# Patient Record
Sex: FEMALE | Race: OTHER | Employment: OTHER | URBAN - METROPOLITAN AREA
[De-identification: names, ages, dates, MRNs, and addresses within clinical notes are randomized per-mention and may not be internally consistent; named-entity substitution may affect disease eponyms.]

---

## 2017-12-14 ENCOUNTER — HOSPITAL ENCOUNTER (OUTPATIENT)
Dept: OTHER | Age: 72
Discharge: HOME | End: 2017-12-14
Attending: FAMILY MEDICINE | Admitting: FAMILY MEDICINE

## 2017-12-14 DIAGNOSIS — R52 PAIN: ICD-10-CM

## 2018-05-01 ENCOUNTER — HOSPITAL ENCOUNTER (OUTPATIENT)
Dept: OTHER | Age: 73
Discharge: HOME | End: 2018-05-31
Attending: FAMILY MEDICINE | Admitting: FAMILY MEDICINE

## 2021-05-28 ENCOUNTER — HOSPITAL ENCOUNTER (OUTPATIENT)
Dept: WOMENS IMAGING | Age: 76
Discharge: HOME | End: 2021-05-28
Payer: MEDICARE

## 2021-05-28 DIAGNOSIS — Z12.31 BREAST CANCER SCREENING BY MAMMOGRAM: ICD-10-CM

## 2021-05-28 PROCEDURE — 77063 BREAST TOMOSYNTHESIS BI: CPT

## 2022-08-10 ENCOUNTER — HOSPITAL ENCOUNTER (INPATIENT)
Age: 77
LOS: 4 days | Discharge: HOME HEALTH | DRG: 247 | End: 2022-08-14
Attending: EMERGENCY MEDICINE | Admitting: INTERNAL MEDICINE
Payer: MEDICARE

## 2022-08-10 ENCOUNTER — APPOINTMENT (OUTPATIENT)
Dept: GENERAL RADIOLOGY | Age: 77
DRG: 247 | End: 2022-08-10
Payer: MEDICARE

## 2022-08-10 ENCOUNTER — APPOINTMENT (OUTPATIENT)
Dept: CT IMAGING | Age: 77
DRG: 247 | End: 2022-08-10
Payer: MEDICARE

## 2022-08-10 DIAGNOSIS — J81.0 ACUTE PULMONARY EDEMA (HCC): Primary | ICD-10-CM

## 2022-08-10 DIAGNOSIS — R06.02 SHORTNESS OF BREATH: ICD-10-CM

## 2022-08-10 DIAGNOSIS — J90 BILATERAL PLEURAL EFFUSION: ICD-10-CM

## 2022-08-10 DIAGNOSIS — R73.9 HYPERGLYCEMIA: ICD-10-CM

## 2022-08-10 DIAGNOSIS — Z20.822 PERSON UNDER INVESTIGATION FOR COVID-19: ICD-10-CM

## 2022-08-10 PROBLEM — I50.9 CHF (CONGESTIVE HEART FAILURE) (HCC): Status: ACTIVE | Noted: 2022-08-10

## 2022-08-10 PROBLEM — I50.9 ACUTE ON CHRONIC HEART FAILURE, UNSPECIFIED HEART FAILURE TYPE (HCC): Status: ACTIVE | Noted: 2022-08-10

## 2022-08-10 PROBLEM — E11.9 DM2 (DIABETES MELLITUS, TYPE 2) (HCC): Status: ACTIVE | Noted: 2022-08-10

## 2022-08-10 PROBLEM — D64.9 ANEMIA: Status: ACTIVE | Noted: 2022-08-10

## 2022-08-10 PROBLEM — R79.89 ELEVATED LIVER FUNCTION TESTS: Status: ACTIVE | Noted: 2022-08-10

## 2022-08-10 PROBLEM — J96.01 ACUTE RESPIRATORY FAILURE WITH HYPOXIA (HCC): Status: ACTIVE | Noted: 2022-08-10

## 2022-08-10 LAB
A/G RATIO: 1.3 (ref 1.1–2.2)
ALBUMIN SERPL-MCNC: 4 G/DL (ref 3.4–5)
ALP BLD-CCNC: 149 U/L (ref 40–129)
ALT SERPL-CCNC: 90 U/L (ref 10–40)
ANION GAP SERPL CALCULATED.3IONS-SCNC: 11 MMOL/L (ref 3–16)
AST SERPL-CCNC: 67 U/L (ref 15–37)
BASOPHILS ABSOLUTE: 0.1 K/UL (ref 0–0.2)
BASOPHILS RELATIVE PERCENT: 1 %
BILIRUB SERPL-MCNC: 0.5 MG/DL (ref 0–1)
BUN BLDV-MCNC: 22 MG/DL (ref 7–20)
CALCIUM SERPL-MCNC: 9.2 MG/DL (ref 8.3–10.6)
CHLORIDE BLD-SCNC: 101 MMOL/L (ref 99–110)
CO2: 22 MMOL/L (ref 21–32)
CREAT SERPL-MCNC: 1 MG/DL (ref 0.6–1.2)
D DIMER: 1.42 UG/ML FEU (ref 0–0.6)
EOSINOPHILS ABSOLUTE: 0.2 K/UL (ref 0–0.6)
EOSINOPHILS RELATIVE PERCENT: 2.8 %
GFR AFRICAN AMERICAN: >60
GFR NON-AFRICAN AMERICAN: 54
GLUCOSE BLD-MCNC: 346 MG/DL (ref 70–99)
GLUCOSE BLD-MCNC: 372 MG/DL (ref 70–99)
HCT VFR BLD CALC: 31.2 % (ref 36–48)
HEMOGLOBIN: 10.7 G/DL (ref 12–16)
LACTIC ACID, SEPSIS: 1.8 MMOL/L (ref 0.4–1.9)
LACTIC ACID, SEPSIS: 2.4 MMOL/L (ref 0.4–1.9)
LYMPHOCYTES ABSOLUTE: 1.9 K/UL (ref 1–5.1)
LYMPHOCYTES RELATIVE PERCENT: 24.4 %
MCH RBC QN AUTO: 31.4 PG (ref 26–34)
MCHC RBC AUTO-ENTMCNC: 34.4 G/DL (ref 31–36)
MCV RBC AUTO: 91.3 FL (ref 80–100)
MONOCYTES ABSOLUTE: 0.5 K/UL (ref 0–1.3)
MONOCYTES RELATIVE PERCENT: 6 %
NEUTROPHILS ABSOLUTE: 5 K/UL (ref 1.7–7.7)
NEUTROPHILS RELATIVE PERCENT: 65.8 %
PDW BLD-RTO: 13.1 % (ref 12.4–15.4)
PERFORMED ON: ABNORMAL
PLATELET # BLD: 281 K/UL (ref 135–450)
PMV BLD AUTO: 8.7 FL (ref 5–10.5)
POTASSIUM REFLEX MAGNESIUM: 4.6 MMOL/L (ref 3.5–5.1)
PRO-BNP: 9808 PG/ML (ref 0–449)
PROCALCITONIN: 0.08 NG/ML (ref 0–0.15)
RBC # BLD: 3.41 M/UL (ref 4–5.2)
SODIUM BLD-SCNC: 134 MMOL/L (ref 136–145)
TOTAL PROTEIN: 7.1 G/DL (ref 6.4–8.2)
TROPONIN: <0.01 NG/ML
WBC # BLD: 7.6 K/UL (ref 4–11)

## 2022-08-10 PROCEDURE — 85025 COMPLETE CBC W/AUTO DIFF WBC: CPT

## 2022-08-10 PROCEDURE — 71260 CT THORAX DX C+: CPT | Performed by: EMERGENCY MEDICINE

## 2022-08-10 PROCEDURE — 96374 THER/PROPH/DIAG INJ IV PUSH: CPT

## 2022-08-10 PROCEDURE — 84484 ASSAY OF TROPONIN QUANT: CPT

## 2022-08-10 PROCEDURE — U0003 INFECTIOUS AGENT DETECTION BY NUCLEIC ACID (DNA OR RNA); SEVERE ACUTE RESPIRATORY SYNDROME CORONAVIRUS 2 (SARS-COV-2) (CORONAVIRUS DISEASE [COVID-19]), AMPLIFIED PROBE TECHNIQUE, MAKING USE OF HIGH THROUGHPUT TECHNOLOGIES AS DESCRIBED BY CMS-2020-01-R: HCPCS

## 2022-08-10 PROCEDURE — 83880 ASSAY OF NATRIURETIC PEPTIDE: CPT

## 2022-08-10 PROCEDURE — 83605 ASSAY OF LACTIC ACID: CPT

## 2022-08-10 PROCEDURE — 99285 EMERGENCY DEPT VISIT HI MDM: CPT

## 2022-08-10 PROCEDURE — U0005 INFEC AGEN DETEC AMPLI PROBE: HCPCS

## 2022-08-10 PROCEDURE — 84145 PROCALCITONIN (PCT): CPT

## 2022-08-10 PROCEDURE — 71045 X-RAY EXAM CHEST 1 VIEW: CPT

## 2022-08-10 PROCEDURE — 87040 BLOOD CULTURE FOR BACTERIA: CPT

## 2022-08-10 PROCEDURE — 6360000002 HC RX W HCPCS: Performed by: EMERGENCY MEDICINE

## 2022-08-10 PROCEDURE — 6370000000 HC RX 637 (ALT 250 FOR IP): Performed by: EMERGENCY MEDICINE

## 2022-08-10 PROCEDURE — 93005 ELECTROCARDIOGRAM TRACING: CPT | Performed by: EMERGENCY MEDICINE

## 2022-08-10 PROCEDURE — 6360000004 HC RX CONTRAST MEDICATION: Performed by: EMERGENCY MEDICINE

## 2022-08-10 PROCEDURE — 80053 COMPREHEN METABOLIC PANEL: CPT

## 2022-08-10 PROCEDURE — 85379 FIBRIN DEGRADATION QUANT: CPT

## 2022-08-10 PROCEDURE — 2060000000 HC ICU INTERMEDIATE R&B

## 2022-08-10 RX ORDER — CEPHALEXIN 500 MG/1
CAPSULE ORAL
Status: ON HOLD | COMMUNITY
End: 2022-08-14 | Stop reason: HOSPADM

## 2022-08-10 RX ORDER — GLIPIZIDE 5 MG/1
5 TABLET ORAL
COMMUNITY
End: 2022-10-05

## 2022-08-10 RX ORDER — ENOXAPARIN SODIUM 100 MG/ML
40 INJECTION SUBCUTANEOUS DAILY
Status: DISCONTINUED | OUTPATIENT
Start: 2022-08-11 | End: 2022-08-14 | Stop reason: HOSPADM

## 2022-08-10 RX ORDER — HYDRALAZINE HYDROCHLORIDE 20 MG/ML
10 INJECTION INTRAMUSCULAR; INTRAVENOUS EVERY 6 HOURS PRN
Status: DISCONTINUED | OUTPATIENT
Start: 2022-08-10 | End: 2022-08-14 | Stop reason: HOSPADM

## 2022-08-10 RX ORDER — GLYBURIDE 5 MG/1
5 TABLET ORAL 2 TIMES DAILY WITH MEALS
Status: DISCONTINUED | OUTPATIENT
Start: 2022-08-11 | End: 2022-08-11 | Stop reason: ALTCHOICE

## 2022-08-10 RX ORDER — CLINDAMYCIN HYDROCHLORIDE 300 MG/1
CAPSULE ORAL
Status: ON HOLD | COMMUNITY
End: 2022-08-14 | Stop reason: HOSPADM

## 2022-08-10 RX ORDER — DEXTROSE MONOHYDRATE 100 MG/ML
INJECTION, SOLUTION INTRAVENOUS CONTINUOUS PRN
Status: DISCONTINUED | OUTPATIENT
Start: 2022-08-10 | End: 2022-08-11 | Stop reason: SDUPTHER

## 2022-08-10 RX ORDER — FUROSEMIDE 10 MG/ML
40 INJECTION INTRAMUSCULAR; INTRAVENOUS ONCE
Status: COMPLETED | OUTPATIENT
Start: 2022-08-10 | End: 2022-08-10

## 2022-08-10 RX ORDER — SITAGLIPTIN AND METFORMIN HYDROCHLORIDE 1000; 50 MG/1; MG/1
1 TABLET, FILM COATED ORAL 2 TIMES DAILY WITH MEALS
COMMUNITY
End: 2022-10-05 | Stop reason: ALTCHOICE

## 2022-08-10 RX ORDER — ONDANSETRON 4 MG/1
4 TABLET, ORALLY DISINTEGRATING ORAL EVERY 8 HOURS PRN
Status: DISCONTINUED | OUTPATIENT
Start: 2022-08-10 | End: 2022-08-14 | Stop reason: HOSPADM

## 2022-08-10 RX ORDER — ACETAMINOPHEN 650 MG/1
650 SUPPOSITORY RECTAL EVERY 6 HOURS PRN
Status: DISCONTINUED | OUTPATIENT
Start: 2022-08-10 | End: 2022-08-14 | Stop reason: HOSPADM

## 2022-08-10 RX ORDER — ONDANSETRON 2 MG/ML
4 INJECTION INTRAMUSCULAR; INTRAVENOUS EVERY 6 HOURS PRN
Status: DISCONTINUED | OUTPATIENT
Start: 2022-08-10 | End: 2022-08-14 | Stop reason: HOSPADM

## 2022-08-10 RX ORDER — SODIUM CHLORIDE 0.9 % (FLUSH) 0.9 %
10 SYRINGE (ML) INJECTION PRN
Status: DISCONTINUED | OUTPATIENT
Start: 2022-08-10 | End: 2022-08-14 | Stop reason: HOSPADM

## 2022-08-10 RX ORDER — POTASSIUM CHLORIDE 7.45 MG/ML
10 INJECTION INTRAVENOUS PRN
Status: DISCONTINUED | OUTPATIENT
Start: 2022-08-10 | End: 2022-08-14 | Stop reason: HOSPADM

## 2022-08-10 RX ORDER — M-VIT,TX,IRON,MINS/CALC/FOLIC 27MG-0.4MG
1 TABLET ORAL DAILY
Status: DISCONTINUED | OUTPATIENT
Start: 2022-08-11 | End: 2022-08-14 | Stop reason: HOSPADM

## 2022-08-10 RX ORDER — FUROSEMIDE 10 MG/ML
40 INJECTION INTRAMUSCULAR; INTRAVENOUS 3 TIMES DAILY
Status: DISCONTINUED | OUTPATIENT
Start: 2022-08-11 | End: 2022-08-11

## 2022-08-10 RX ORDER — POTASSIUM CHLORIDE 20 MEQ/1
40 TABLET, EXTENDED RELEASE ORAL PRN
Status: DISCONTINUED | OUTPATIENT
Start: 2022-08-10 | End: 2022-08-14 | Stop reason: HOSPADM

## 2022-08-10 RX ORDER — SODIUM CHLORIDE 0.9 % (FLUSH) 0.9 %
5-40 SYRINGE (ML) INJECTION EVERY 12 HOURS SCHEDULED
Status: DISCONTINUED | OUTPATIENT
Start: 2022-08-11 | End: 2022-08-14 | Stop reason: HOSPADM

## 2022-08-10 RX ORDER — 0.9 % SODIUM CHLORIDE 0.9 %
500 INTRAVENOUS SOLUTION INTRAVENOUS PRN
Status: DISCONTINUED | OUTPATIENT
Start: 2022-08-10 | End: 2022-08-14 | Stop reason: HOSPADM

## 2022-08-10 RX ORDER — SODIUM CHLORIDE 9 MG/ML
INJECTION, SOLUTION INTRAVENOUS PRN
Status: DISCONTINUED | OUTPATIENT
Start: 2022-08-10 | End: 2022-08-14 | Stop reason: HOSPADM

## 2022-08-10 RX ORDER — ACETAMINOPHEN 325 MG/1
650 TABLET ORAL EVERY 6 HOURS PRN
Status: DISCONTINUED | OUTPATIENT
Start: 2022-08-10 | End: 2022-08-14 | Stop reason: HOSPADM

## 2022-08-10 RX ADMIN — NITROGLYCERIN 1 INCH: 20 OINTMENT TOPICAL at 21:52

## 2022-08-10 RX ADMIN — FUROSEMIDE 40 MG: 10 INJECTION, SOLUTION INTRAMUSCULAR; INTRAVENOUS at 21:55

## 2022-08-10 RX ADMIN — IOPAMIDOL 75 ML: 755 INJECTION, SOLUTION INTRAVENOUS at 20:33

## 2022-08-10 ASSESSMENT — PAIN DESCRIPTION - LOCATION
LOCATION: CHEST
LOCATION: SHOULDER

## 2022-08-10 ASSESSMENT — PAIN DESCRIPTION - ORIENTATION: ORIENTATION: LEFT

## 2022-08-10 ASSESSMENT — PAIN DESCRIPTION - ONSET: ONSET: ON-GOING

## 2022-08-10 ASSESSMENT — PAIN SCALES - GENERAL: PAINLEVEL_OUTOF10: 7

## 2022-08-10 ASSESSMENT — PAIN - FUNCTIONAL ASSESSMENT: PAIN_FUNCTIONAL_ASSESSMENT: NONE - DENIES PAIN

## 2022-08-10 ASSESSMENT — PAIN DESCRIPTION - DESCRIPTORS: DESCRIPTORS: SORE

## 2022-08-10 ASSESSMENT — PAIN DESCRIPTION - FREQUENCY: FREQUENCY: CONTINUOUS

## 2022-08-10 NOTE — ED PROVIDER NOTES
[facility] Emergency Department    Cori Winkler MD, am the primary clinician of record. CHIEF COMPLAINT  Chief Complaint   Patient presents with    Shortness of Breath     SOB, can not walk, no sleep for 2 days, if she walks its gets work. HISTORY OF PRESENT ILLNESS  Hao Nolasco is a 78 y.o. female  who presents to the ED complaining of shortness of breath, really over the past 10 days or so but particularly bad over the past 2 days. She has not had much coughing. Denies any leg swelling. She is worried about heart failure given that she lifted up and has orthopnea. She also has not had a fever. She went to urgent care and was told she was hypoxic and placed on nasal cannula oxygen for several hours and then sent here. She had a rapid COVID test there that was negative. She is not having chest pain or belly pain currently. History of diabetes. 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 No other complaints, modifying factors or associated symptoms. I have reviewed the following from the nursing documentation. Past Medical History:   Diagnosis Date    Diabetes mellitus ([facility] [address]     Hypothyroid      History reviewed. No pertinent surgical history. History reviewed. No pertinent family history.   Social History     Socioeconomic History    Marital status:      Spouse name: Not on file    Number of children: Not on file    Years of education: Not on file    Highest education level: Not on file   Occupational History    Not on file   Tobacco Use    Smoking status: Never    Smokeless tobacco: Not on file   Substance and Sexual Activity    Alcohol use: No    Drug use: Not on file    Sexual activity: Not on file   Other Topics Concern    Not on file   Social History Narrative    Not on file     Social Determinants of Health     Financial Resource Strain: Not on file   Food Insecurity: Not on file   Transportation Needs: Not on file   Physical Activity: Not on file   Stress: Not on file   Social Connections: Not on file   Intimate Partner Violence: Not on file   Housing Stability: Not on file     No current facility-administered medications for this encounter. Current Outpatient Medications   Medication Sig Dispense Refill    sitaGLIPtan-metFORMIN (JANUMET)  MG per tablet Janumet 50 mg-1,000 mg tablet      glipiZIDE (GLUCOTROL) 5 MG tablet glipizide 5 mg tablet      cephALEXin (KEFLEX) 500 MG capsule cephalexin 500 mg capsule (Patient not taking: Reported on 8/10/2022)      clindamycin (CLEOCIN) 300 MG capsule clindamycin HCl 300 mg capsule (Patient not taking: Reported on 8/10/2022)      metformin (GLUCOPHAGE) 500 MG tablet Take 1,000 mg by mouth 2 times daily (with meals). glyBURIDE (DIABETA) 5 MG tablet Take 5 mg by mouth 2 times daily (with meals). therapeutic multivitamin-minerals (THERAGRAN-M) tablet Take 1 tablet by mouth daily. Allergies   Allergen Reactions    Codeine      nausea  Other reaction(s): Vomiting       REVIEW OF SYSTEMS  10 systems reviewed, pertinent positives per HPI otherwise noted to be negative. PHYSICAL EXAM  /72   Pulse 89   Temp 98.1 °F (36.7 °C)   Resp 24   Ht 4' 10\" (1.473 m)   Wt 85 lb 14.4 oz (39 kg)   SpO2 95%   BMI 17.95 kg/m²    GENERAL APPEARANCE: Awake and alert. Cooperative. No distress. HENT: Normocephalic. Atraumatic. Mucous membranes are dry. NECK: Supple. EYES: PERRL. EOM's grossly intact. HEART/CHEST: RRR. No murmurs. No chest wall tenderness. LUNGS: Respirations mildly labored with conversational dyspnea and bibasilar rales with scattered rhonchi, no wheezing on my evaluation. ABDOMEN: No tenderness. Soft. Non-distended. No masses. No organomegaly. No guarding or rebound. Normal bowel sounds throughout. MUSCULOSKELETAL: No extremity edema. Compartments soft. No deformity. No tenderness in the extremities. All extremities neurovascularly intact.   SKIN: Warm and dry. No acute rashes. NEUROLOGICAL: Alert and oriented. CN's 2-12 intact. No gross facial drooping. Strength 5/5, sensation intact. 2 plus DTR's in knees bilaterally. Gait normal.  PSYCHIATRIC: Normal mood and affect. LABS  I have reviewed all labs for this visit.    Results for orders placed or performed during the hospital encounter of 08/10/22   CBC with Auto Differential   Result Value Ref Range    WBC 7.6 4.0 - 11.0 K/uL    RBC 3.41 (L) 4.00 - 5.20 M/uL    Hemoglobin 10.7 (L) 12.0 - 16.0 g/dL    Hematocrit 31.2 (L) 36.0 - 48.0 %    MCV 91.3 80.0 - 100.0 fL    MCH 31.4 26.0 - 34.0 pg    MCHC 34.4 31.0 - 36.0 g/dL    RDW 13.1 12.4 - 15.4 %    Platelets 253 182 - 732 K/uL    MPV 8.7 5.0 - 10.5 fL    Neutrophils % 65.8 %    Lymphocytes % 24.4 %    Monocytes % 6.0 %    Eosinophils % 2.8 %    Basophils % 1.0 %    Neutrophils Absolute 5.0 1.7 - 7.7 K/uL    Lymphocytes Absolute 1.9 1.0 - 5.1 K/uL    Monocytes Absolute 0.5 0.0 - 1.3 K/uL    Eosinophils Absolute 0.2 0.0 - 0.6 K/uL    Basophils Absolute 0.1 0.0 - 0.2 K/uL   Comprehensive Metabolic Panel w/ Reflex to MG   Result Value Ref Range    Sodium 134 (L) 136 - 145 mmol/L    Potassium reflex Magnesium 4.6 3.5 - 5.1 mmol/L    Chloride 101 99 - 110 mmol/L    CO2 22 21 - 32 mmol/L    Anion Gap 11 3 - 16    Glucose 372 (H) 70 - 99 mg/dL    BUN 22 (H) 7 - 20 mg/dL    Creatinine 1.0 0.6 - 1.2 mg/dL    GFR Non-African American 54 (A) >60    GFR African American >60 >60    Calcium 9.2 8.3 - 10.6 mg/dL    Total Protein 7.1 6.4 - 8.2 g/dL    Albumin 4.0 3.4 - 5.0 g/dL    Albumin/Globulin Ratio 1.3 1.1 - 2.2    Total Bilirubin 0.5 0.0 - 1.0 mg/dL    Alkaline Phosphatase 149 (H) 40 - 129 U/L    ALT 90 (H) 10 - 40 U/L    AST 67 (H) 15 - 37 U/L   Troponin   Result Value Ref Range    Troponin <0.01 <0.01 ng/mL   Brain Natriuretic Peptide   Result Value Ref Range    Pro-BNP 9,808 (H) 0 - 449 pg/mL   Lactate, Sepsis   Result Value Ref Range    Lactic Acid, Sepsis 2.4 (H) 0.4 - 1.9 mmol/L   Lactate, Sepsis   Result Value Ref Range    Lactic Acid, Sepsis 1.8 0.4 - 1.9 mmol/L   Procalcitonin   Result Value Ref Range    Procalcitonin 0.08 0.00 - 0.15 ng/mL   D-Dimer, Quantitative   Result Value Ref Range    D-Dimer, Quant 1.42 (H) 0.00 - 0.60 ug/mL FEU   EKG 12 Lead   Result Value Ref Range    Ventricular Rate 99 BPM    Atrial Rate 99 BPM    P-R Interval 136 ms    QRS Duration 82 ms    Q-T Interval 352 ms    QTc Calculation (Bazett) 451 ms    P Axis 25 degrees    R Axis -15 degrees    T Axis -30 degrees    Diagnosis       Normal sinus rhythmPossible Anterior infarct , age undeterminedAbnormal ECG       The 12 lead EKG was interpreted by me as follows:  Rate: normal with a rate of 99  Rhythm: sinus  Axis: normal  Intervals: normal GA, narrow QRS, normal QTc  ST segments: no ST elevations or depressions  T waves: no abnormal inversions  Non-specific T wave changes: not present  Prior EKG comparison: No prior is currently available for comparison    RADIOLOGY    XR CHEST PORTABLE    Result Date: 8/10/2022  EXAMINATION: ONE XRAY VIEW OF THE CHEST 8/10/2022 6:26 pm COMPARISON: None. HISTORY: ORDERING SYSTEM PROVIDED HISTORY: SOB TECHNOLOGIST PROVIDED HISTORY: Reason for exam:->SOB Reason for Exam: SHOB FINDINGS: The cardiac silhouette is enlarged. Aortic vascular calcification. There is vascular congestion with perihilar edema along with small bilateral effusions most consistent with CHF. Mild dependent atelectasis is suggested bilaterally. CHF with cardiomegaly, perihilar edema and small bilateral effusions. CT CHEST PULMONARY EMBOLISM W CONTRAST    Result Date: 8/10/2022  EXAMINATION: CTA OF THE CHEST 8/10/2022 8:08 pm TECHNIQUE: CTA of the chest was performed after the administration of intravenous contrast.  Multiplanar reformatted images are provided for review. MIP images are provided for review.  Automated exposure control, iterative reconstruction, and/or weight based adjustment of the mA/kV was utilized to reduce the radiation dose to as low as reasonably achievable. COMPARISON: None. HISTORY: ORDERING SYSTEM PROVIDED HISTORY: +SOB, +ddimer TECHNOLOGIST PROVIDED HISTORY: Reason for exam:->+SOB, +ddimer Decision Support Exception - unselect if not a suspected or confirmed emergency medical condition->Emergency Medical Condition (MA) Reason for Exam: +SOB, +ddimer FINDINGS: Pulmonary Arteries: Pulmonary arteries are adequately opacified for evaluation. No evidence of intraluminal filling defect to suggest pulmonary embolism. Main pulmonary artery is normal in caliber. Mediastinum: No evidence of mediastinal lymphadenopathy. The heart and pericardium demonstrate no acute abnormality. There is no acute abnormality of the thoracic aorta. Lungs/pleura: There is septal thickening, likely related to mild pulmonary vascular congestion. There are moderate bilateral pleural effusions. There is mild dependent and discoid atelectasis at the bilateral lung bases. There is bronchial wall thickening, likely related to small airway disease or bronchiolitis. There is mucous plugging. No pneumothorax. Upper Abdomen: Limited images of the upper abdomen are unremarkable. Soft Tissues/Bones: No acute bone or soft tissue abnormality. No evidence of pulmonary embolism Septal thickening, likely related to mild pulmonary vascular congestion. Moderate bilateral pleural effusions. Bronchial wall thickening, likely related to small airway disease or bronchiolitis. Mucous plugging. ED COURSE/MDM  Patient seen and evaluated. Old records reviewed. Labs and imaging reviewed and results discussed with patient.       After initial evaluation, differential diagnostic considerations included: acute coronary syndrome, pulmonary embolism, COPD/asthma, pneumonia, sepsis, pericardial tamponade, pneumothorax, CHF, thoracic aortic dissection, anxiety      The patient's ED workup was notable for concern for new diagnosis of heart failure with pulmonary edema and bilateral pleural effusions. Not frankly hypoxic here but due to work of breathing and ambulatory pulse oxygenation she was placed on nasal cannula oxygen with improvement of her symptoms of shortness of breath. Her BNP is notably elevated and imaging does demonstrate some fluid overload although not really a lot of peripheral edema on exam.  As such she will be diuresed and given nitroglycerin to augment the Lasix. She does not have a leukocytosis. Initial lactate mildly elevated but this is more attributable to her respiratory symptoms and heart failure and not sepsis, I do not suspect bacterial infection. Procalcitonin is also normal.  Rapid COVID-negative at urgent care prior to arrival.  I did obtain a PCR to rule this out here although suspicion for infection is lower. Patient be admitted to the hospital for further evaluation. Her high blood sugar is not associated with DKA. CTPA without infiltrate or PE. Is this patient to be included in the SEP-1 Core Measure? No   Exclusion criteria - the patient is NOT to be included for SEP-1 Core Measure due to: Alternative explanation for abnormal labs/vitals that do not relate to sepsis, see MDM for further explanation  Bacterial infection not found or suspected. During the patient's ED course, the patient was given:  Medications   iopamidol (ISOVUE-370) 76 % injection 75 mL (75 mLs IntraVENous Given 8/10/22 2033)   furosemide (LASIX) injection 40 mg (40 mg IntraVENous Given 8/10/22 2155)   nitroglycerin (NITRO-BID) 2 % ointment 1 inch (1 inch Topical Given 8/10/22 2152)        CLINICAL IMPRESSION  1. Acute pulmonary edema (HCC)    2. Shortness of breath    3. Person under investigation for COVID-19    4. Hyperglycemia    5. Bilateral pleural effusion        Blood pressure 136/72, pulse 89, temperature 98.1 °F (36.7 °C), resp.  rate 24, height 4' 10\" (1.473 m), weight 85 lb 14.4 oz (39 kg), SpO2 95 %. DISPOSITION  Hao Gooden was admitted in fair condition. The plan is to admit to the hospital at this time under the PCP's admitting service. Dr. Louise Xavier accepted the patient and will take over the patient's care. The total critical care time I independently spent while evaluating and treating this patient was 32 minutes. This excludes time spent doing separately billable procedures. This includes time at the bedside, data interpretation, medication management, obtaining critical history from collateral sources if the patient is unable to provide it directly, and physician consultation. Specifics of interventions taken and potentially life-threatening diagnostic considerations are listed above in the medical decision making. If this was a shared visit with an EMILIANO, the time in this attestation is non-concurrent critical care time out of the total shared critical care time provided by the EMILIANO and myself. DISCLAIMER: This chart was created using Dragon dictation software. Efforts were made by me to ensure accuracy, however some errors may be present due to limitations of this technology and occasionally words are not transcribed correctly.         Barbara Youssef MD  08/10/22 2228       Barbara Youssef MD  08/10/22 2226

## 2022-08-11 ENCOUNTER — APPOINTMENT (OUTPATIENT)
Dept: ULTRASOUND IMAGING | Age: 77
DRG: 247 | End: 2022-08-11
Payer: MEDICARE

## 2022-08-11 ENCOUNTER — APPOINTMENT (OUTPATIENT)
Dept: GENERAL RADIOLOGY | Age: 77
DRG: 247 | End: 2022-08-11
Payer: MEDICARE

## 2022-08-11 PROBLEM — R06.02 SHORTNESS OF BREATH: Status: ACTIVE | Noted: 2022-08-11

## 2022-08-11 PROBLEM — J81.0 ACUTE PULMONARY EDEMA (HCC): Status: ACTIVE | Noted: 2022-08-11

## 2022-08-11 PROBLEM — I50.21 ACUTE SYSTOLIC (CONGESTIVE) HEART FAILURE (HCC): Status: ACTIVE | Noted: 2022-08-11

## 2022-08-11 LAB
A/G RATIO: 1.3 (ref 1.1–2.2)
ALBUMIN SERPL-MCNC: 4.1 G/DL (ref 3.4–5)
ALP BLD-CCNC: 148 U/L (ref 40–129)
ALT SERPL-CCNC: 84 U/L (ref 10–40)
ANION GAP SERPL CALCULATED.3IONS-SCNC: 13 MMOL/L (ref 3–16)
AST SERPL-CCNC: 57 U/L (ref 15–37)
BASOPHILS ABSOLUTE: 0.1 K/UL (ref 0–0.2)
BASOPHILS RELATIVE PERCENT: 1.2 %
BILIRUB SERPL-MCNC: 0.6 MG/DL (ref 0–1)
BILIRUBIN DIRECT: <0.2 MG/DL (ref 0–0.3)
BILIRUBIN, INDIRECT: NORMAL MG/DL (ref 0–1)
BUN BLDV-MCNC: 25 MG/DL (ref 7–20)
CALCIUM SERPL-MCNC: 9.6 MG/DL (ref 8.3–10.6)
CHLORIDE BLD-SCNC: 96 MMOL/L (ref 99–110)
CHOLESTEROL, TOTAL: 195 MG/DL (ref 0–199)
CO2: 25 MMOL/L (ref 21–32)
CREAT SERPL-MCNC: 0.9 MG/DL (ref 0.6–1.2)
D DIMER: 1.41 UG/ML FEU (ref 0–0.6)
EKG ATRIAL RATE: 99 BPM
EKG DIAGNOSIS: NORMAL
EKG P AXIS: 25 DEGREES
EKG P-R INTERVAL: 136 MS
EKG Q-T INTERVAL: 352 MS
EKG QRS DURATION: 82 MS
EKG QTC CALCULATION (BAZETT): 451 MS
EKG R AXIS: -15 DEGREES
EKG T AXIS: -30 DEGREES
EKG VENTRICULAR RATE: 99 BPM
EOSINOPHILS ABSOLUTE: 0.4 K/UL (ref 0–0.6)
EOSINOPHILS RELATIVE PERCENT: 4.4 %
ESTIMATED AVERAGE GLUCOSE: 240.3 MG/DL
GFR AFRICAN AMERICAN: >60
GFR NON-AFRICAN AMERICAN: >60
GLUCOSE BLD-MCNC: 277 MG/DL (ref 70–99)
GLUCOSE BLD-MCNC: 316 MG/DL (ref 70–99)
GLUCOSE BLD-MCNC: 364 MG/DL (ref 70–99)
GLUCOSE BLD-MCNC: 404 MG/DL (ref 70–99)
GLUCOSE BLD-MCNC: 514 MG/DL (ref 70–99)
GLUCOSE BLD-MCNC: 97 MG/DL (ref 70–99)
HAV IGM SER IA-ACNC: NORMAL
HBA1C MFR BLD: 10 %
HCT VFR BLD CALC: 34.1 % (ref 36–48)
HDLC SERPL-MCNC: 63 MG/DL (ref 40–60)
HEMOGLOBIN: 11.7 G/DL (ref 12–16)
HEPATITIS B CORE IGM ANTIBODY: NORMAL
HEPATITIS B SURFACE ANTIGEN INTERPRETATION: NORMAL
HEPATITIS C ANTIBODY INTERPRETATION: NORMAL
IRON SATURATION: 28 % (ref 15–50)
IRON: 87 UG/DL (ref 37–145)
LACTIC ACID: 2.3 MMOL/L (ref 0.4–2)
LDL CHOLESTEROL CALCULATED: 109 MG/DL
LV EF: 18 %
LVEF MODALITY: NORMAL
LYMPHOCYTES ABSOLUTE: 2 K/UL (ref 1–5.1)
LYMPHOCYTES RELATIVE PERCENT: 25.3 %
MAGNESIUM: 1.8 MG/DL (ref 1.8–2.4)
MCH RBC QN AUTO: 30.9 PG (ref 26–34)
MCHC RBC AUTO-ENTMCNC: 34.2 G/DL (ref 31–36)
MCV RBC AUTO: 90.4 FL (ref 80–100)
MONOCYTES ABSOLUTE: 0.5 K/UL (ref 0–1.3)
MONOCYTES RELATIVE PERCENT: 6.2 %
NEUTROPHILS ABSOLUTE: 5 K/UL (ref 1.7–7.7)
NEUTROPHILS RELATIVE PERCENT: 62.9 %
PDW BLD-RTO: 13.1 % (ref 12.4–15.4)
PERFORMED ON: ABNORMAL
PERFORMED ON: NORMAL
PLATELET # BLD: 291 K/UL (ref 135–450)
PMV BLD AUTO: 8.9 FL (ref 5–10.5)
POTASSIUM SERPL-SCNC: 4 MMOL/L (ref 3.5–5.1)
RBC # BLD: 3.78 M/UL (ref 4–5.2)
SARS-COV-2, PCR: NOT DETECTED
SODIUM BLD-SCNC: 134 MMOL/L (ref 136–145)
T4 FREE: 1.6 NG/DL (ref 0.9–1.8)
TOTAL IRON BINDING CAPACITY: 316 UG/DL (ref 260–445)
TOTAL PROTEIN: 7.2 G/DL (ref 6.4–8.2)
TRIGL SERPL-MCNC: 115 MG/DL (ref 0–150)
TSH SERPL DL<=0.05 MIU/L-ACNC: 2.93 UIU/ML (ref 0.27–4.2)
VLDLC SERPL CALC-MCNC: 23 MG/DL
WBC # BLD: 8 K/UL (ref 4–11)

## 2022-08-11 PROCEDURE — 83540 ASSAY OF IRON: CPT

## 2022-08-11 PROCEDURE — 99223 1ST HOSP IP/OBS HIGH 75: CPT | Performed by: INTERNAL MEDICINE

## 2022-08-11 PROCEDURE — 80061 LIPID PANEL: CPT

## 2022-08-11 PROCEDURE — 82248 BILIRUBIN DIRECT: CPT

## 2022-08-11 PROCEDURE — 97165 OT EVAL LOW COMPLEX 30 MIN: CPT

## 2022-08-11 PROCEDURE — 6370000000 HC RX 637 (ALT 250 FOR IP): Performed by: INTERNAL MEDICINE

## 2022-08-11 PROCEDURE — 93306 TTE W/DOPPLER COMPLETE: CPT

## 2022-08-11 PROCEDURE — 83605 ASSAY OF LACTIC ACID: CPT

## 2022-08-11 PROCEDURE — 97535 SELF CARE MNGMENT TRAINING: CPT

## 2022-08-11 PROCEDURE — 85025 COMPLETE CBC W/AUTO DIFF WBC: CPT

## 2022-08-11 PROCEDURE — 83550 IRON BINDING TEST: CPT

## 2022-08-11 PROCEDURE — 2580000003 HC RX 258: Performed by: INTERNAL MEDICINE

## 2022-08-11 PROCEDURE — 97530 THERAPEUTIC ACTIVITIES: CPT

## 2022-08-11 PROCEDURE — 83036 HEMOGLOBIN GLYCOSYLATED A1C: CPT

## 2022-08-11 PROCEDURE — 80074 ACUTE HEPATITIS PANEL: CPT

## 2022-08-11 PROCEDURE — 84443 ASSAY THYROID STIM HORMONE: CPT

## 2022-08-11 PROCEDURE — 80053 COMPREHEN METABOLIC PANEL: CPT

## 2022-08-11 PROCEDURE — 97161 PT EVAL LOW COMPLEX 20 MIN: CPT

## 2022-08-11 PROCEDURE — 84439 ASSAY OF FREE THYROXINE: CPT

## 2022-08-11 PROCEDURE — 85379 FIBRIN DEGRADATION QUANT: CPT

## 2022-08-11 PROCEDURE — 36415 COLL VENOUS BLD VENIPUNCTURE: CPT

## 2022-08-11 PROCEDURE — 97116 GAIT TRAINING THERAPY: CPT

## 2022-08-11 PROCEDURE — 6360000002 HC RX W HCPCS: Performed by: INTERNAL MEDICINE

## 2022-08-11 PROCEDURE — 93010 ELECTROCARDIOGRAM REPORT: CPT | Performed by: INTERNAL MEDICINE

## 2022-08-11 PROCEDURE — 83735 ASSAY OF MAGNESIUM: CPT

## 2022-08-11 PROCEDURE — 2060000000 HC ICU INTERMEDIATE R&B

## 2022-08-11 PROCEDURE — 71046 X-RAY EXAM CHEST 2 VIEWS: CPT

## 2022-08-11 RX ORDER — INSULIN LISPRO 100 [IU]/ML
0-4 INJECTION, SOLUTION INTRAVENOUS; SUBCUTANEOUS NIGHTLY
Status: DISCONTINUED | OUTPATIENT
Start: 2022-08-11 | End: 2022-08-13

## 2022-08-11 RX ORDER — ALOGLIPTIN 6.25 MG/1
6.25 TABLET, FILM COATED ORAL DAILY
Status: DISCONTINUED | OUTPATIENT
Start: 2022-08-11 | End: 2022-08-14 | Stop reason: HOSPADM

## 2022-08-11 RX ORDER — FUROSEMIDE 40 MG/1
40 TABLET ORAL DAILY
Status: DISCONTINUED | OUTPATIENT
Start: 2022-08-12 | End: 2022-08-12

## 2022-08-11 RX ORDER — DEXTROSE MONOHYDRATE 100 MG/ML
INJECTION, SOLUTION INTRAVENOUS CONTINUOUS PRN
Status: DISCONTINUED | OUTPATIENT
Start: 2022-08-11 | End: 2022-08-14 | Stop reason: HOSPADM

## 2022-08-11 RX ORDER — LEVOTHYROXINE SODIUM 0.05 MG/1
50 TABLET ORAL DAILY
COMMUNITY

## 2022-08-11 RX ORDER — INSULIN LISPRO 100 [IU]/ML
0-8 INJECTION, SOLUTION INTRAVENOUS; SUBCUTANEOUS
Status: DISCONTINUED | OUTPATIENT
Start: 2022-08-11 | End: 2022-08-13

## 2022-08-11 RX ORDER — ASPIRIN 81 MG/1
81 TABLET, CHEWABLE ORAL DAILY
Status: DISCONTINUED | OUTPATIENT
Start: 2022-08-11 | End: 2022-08-12

## 2022-08-11 RX ADMIN — METFORMIN HYDROCHLORIDE 500 MG: 500 TABLET ORAL at 18:51

## 2022-08-11 RX ADMIN — ACETAMINOPHEN 650 MG: 325 TABLET ORAL at 16:11

## 2022-08-11 RX ADMIN — ACETAMINOPHEN 650 MG: 325 TABLET ORAL at 22:10

## 2022-08-11 RX ADMIN — Medication 10 ML: at 21:20

## 2022-08-11 RX ADMIN — ENOXAPARIN SODIUM 40 MG: 100 INJECTION SUBCUTANEOUS at 09:11

## 2022-08-11 RX ADMIN — INSULIN LISPRO 4 UNITS: 100 INJECTION, SOLUTION INTRAVENOUS; SUBCUTANEOUS at 23:59

## 2022-08-11 RX ADMIN — ASPIRIN 81 MG: 81 TABLET, CHEWABLE ORAL at 18:51

## 2022-08-11 RX ADMIN — EMPAGLIFLOZIN 25 MG: 10 TABLET, FILM COATED ORAL at 12:43

## 2022-08-11 RX ADMIN — METFORMIN HYDROCHLORIDE 500 MG: 500 TABLET ORAL at 09:12

## 2022-08-11 RX ADMIN — Medication 10 ML: at 09:12

## 2022-08-11 RX ADMIN — MULTIPLE VITAMINS W/ MINERALS TAB 1 TABLET: TAB at 09:12

## 2022-08-11 RX ADMIN — INSULIN LISPRO 8 UNITS: 100 INJECTION, SOLUTION INTRAVENOUS; SUBCUTANEOUS at 12:43

## 2022-08-11 RX ADMIN — SODIUM CHLORIDE, PRESERVATIVE FREE 10 ML: 5 INJECTION INTRAVENOUS at 00:21

## 2022-08-11 RX ADMIN — ALOGLIPTIN 6.25 MG: 6.25 TABLET, FILM COATED ORAL at 09:12

## 2022-08-11 RX ADMIN — FUROSEMIDE 40 MG: 10 INJECTION, SOLUTION INTRAMUSCULAR; INTRAVENOUS at 00:22

## 2022-08-11 RX ADMIN — FUROSEMIDE 40 MG: 10 INJECTION, SOLUTION INTRAMUSCULAR; INTRAVENOUS at 09:12

## 2022-08-11 ASSESSMENT — PAIN DESCRIPTION - ORIENTATION
ORIENTATION: LEFT;RIGHT
ORIENTATION: LEFT
ORIENTATION: RIGHT;LEFT
ORIENTATION: LEFT;RIGHT

## 2022-08-11 ASSESSMENT — PAIN SCALES - GENERAL
PAINLEVEL_OUTOF10: 0
PAINLEVEL_OUTOF10: 8
PAINLEVEL_OUTOF10: 2
PAINLEVEL_OUTOF10: 3
PAINLEVEL_OUTOF10: 0
PAINLEVEL_OUTOF10: 2
PAINLEVEL_OUTOF10: 0

## 2022-08-11 ASSESSMENT — PAIN DESCRIPTION - LOCATION
LOCATION: SHOULDER
LOCATION: LEG
LOCATION: FOOT
LOCATION: FOOT

## 2022-08-11 ASSESSMENT — PAIN DESCRIPTION - DESCRIPTORS
DESCRIPTORS: CRAMPING
DESCRIPTORS: NUMBNESS

## 2022-08-11 ASSESSMENT — PAIN DESCRIPTION - FREQUENCY: FREQUENCY: CONTINUOUS

## 2022-08-11 ASSESSMENT — PAIN DESCRIPTION - ONSET: ONSET: ON-GOING

## 2022-08-11 NOTE — ED NOTES
Pt concerned about  getting home.  alert and oriented x 4 after this RN assessment. Pt and  are just concerned about not being able to drive at night. Lysofia called for  and will call his wife (pt) after arriving at home. Walked with pt to lobby for finn. Gait steady.       Glenroy Ibarra RN  08/10/22 2329

## 2022-08-11 NOTE — PROGRESS NOTES
Progress Note - Dr. Laura Mujica - Internal Medicine  PCP: Rock Friedman, [address] C[place] / [facility] [phone]    Hospital Day: 1  Code Status: Full Code  Current Diet: ADULT DIET; Regular; 4 carb choices (60 gm/meal)        CC: follow up on medical issues    Subjective:   Coretta Das is a 78 y.o. female. Pt seen and examined  Chart reviewed since last visit, labs and imaging below      Doing better  Now on RA  -1200 out overnight  Awaiting cards eval    Covid test still pendin      Review of Systems: (1 system for EPF, 2-9 for detailed, 10+ for comprehensive)  Constitutional: Negative for chills and fever. HENT: Negative for dental problem, nosebleeds and rhinorrhea. Eyes: Negative for photophobia and visual disturbance. Respiratory: Negative for cough, chest tightness and positive for some shortness of breath. Cardiovascular: Negative for chest pain and leg swelling. Gastrointestinal: Negative for diarrhea, nausea and vomiting. Endocrine: Negative for polydipsia and polyphagia. Genitourinary: Negative for frequency, hematuria and urgency. Musculoskeletal: Negative for back pain and myalgias. Skin: Negative for rash. Allergic/Immunologic: Negative for food allergies. Neurological: Negative for dizziness, seizures, syncope and facial asymmetry. Hematological: Negative for adenopathy. Psychiatric/Behavioral: Negative for dysphoric mood. The patient is not nervous/anxious. I have reviewed the patient's medical and social history in detail and updated the computerized patient record. To recap: She  has a past medical history of Diabetes mellitus ([facility] and Hypothyroid. . She  has no past surgical history on file. . She  reports that she does not drink alcohol. .        Active Hospital Problems    Diagnosis Date Noted    CHF (congestive heart failure) ([facility] [I50.9] 08/10/2022     Priority: Medium    DM2 (diabetes mellitus, type 2) ([facility] [E11.9] 08/10/2022     Priority: Medium    Elevated liver function tests [R79.89] 08/10/2022     Priority: Medium    Anemia [D64.9] 08/10/2022     Priority: Medium    Bilateral pleural effusion [J90] 08/10/2022     Priority: Medium    Acute on chronic heart failure, unspecified heart failure type ([facility] [I50.9] 08/10/2022     Priority: Medium    Acute respiratory failure with hypoxia [facility]) [J96.01] 08/10/2022     Priority: Medium    Suspected COVID-19 virus infection [Z20.822] 08/10/2022     Priority: Medium       Current Facility-Administered Medications: alogliptin (NESINA) tablet 6.25 mg, 6.25 mg, Oral, Daily  metFORMIN (GLUCOPHAGE) tablet 500 mg, 500 mg, Oral, BID WC  empagliflozin (JARDIANCE) tablet 25 mg, 25 mg, Oral, Daily  glucose-vitamin C chewable tablet 4 tablet, 4 tablet, Oral, PRN  dextrose bolus 10% 125 mL, 125 mL, IntraVENous, PRN **OR** dextrose bolus 10% 250 mL, 250 mL, IntraVENous, PRN  glucagon (rDNA) injection 1 mg, 1 mg, SubCUTAneous, PRN  dextrose 10 % infusion, , IntraVENous, Continuous PRN  therapeutic multivitamin-minerals 1 tablet, 1 tablet, Oral, Daily  perflutren lipid microspheres (DEFINITY) injection 1.65 mg, 1.5 mL, IntraVENous, ONCE PRN  sodium chloride flush 0.9 % injection 5-40 mL, 5-40 mL, IntraVENous, 2 times per day  sodium chloride flush 0.9 % injection 10 mL, 10 mL, IntraVENous, PRN  0.9 % sodium chloride infusion, , IntraVENous, PRN  ondansetron (ZOFRAN-ODT) disintegrating tablet 4 mg, 4 mg, Oral, Q8H PRN **OR** ondansetron (ZOFRAN) injection 4 mg, 4 mg, IntraVENous, Q6H PRN  magnesium hydroxide (MILK OF MAGNESIA) 400 MG/5ML suspension 30 mL, 30 mL, Oral, Daily PRN  acetaminophen (TYLENOL) tablet 650 mg, 650 mg, Oral, Q6H PRN **OR** acetaminophen (TYLENOL) suppository 650 mg, 650 mg, Rectal, Q6H PRN  enoxaparin (LOVENOX) injection 40 mg, 40 mg, SubCUTAneous, Daily  furosemide (LASIX) injection 40 mg, 40 mg, IntraVENous, TID  hydrALAZINE (APRESOLINE) injection 10 mg, 10 mg, IntraVENous, Q6H PRN  0.9 % sodium chloride bolus, 500 mL, IntraVENous, PRN  potassium chloride (KLOR-CON M) extended release tablet 40 mEq, 40 mEq, Oral, PRN **OR** potassium bicarb-citric acid (EFFER-K) effervescent tablet 40 mEq, 40 mEq, Oral, PRN **OR** potassium chloride 10 mEq/100 mL IVPB (Peripheral Line), 10 mEq, IntraVENous, PRN         Objective:  /64   Pulse 74   Temp 97.6 °F (36.4 °C) (Oral)   Resp 16   Ht 4' 10\" (1.473 m)   Wt 81 lb 12.8 oz (37.1 kg)   SpO2 97%   BMI 17.10 kg/m²      Patient Vitals for the past 24 hrs:   BP Temp Temp src Pulse Resp SpO2 Height Weight   08/11/22 0857 -- -- -- 74 -- -- -- --   08/11/22 0701 112/64 97.6 °F (36.4 °C) Oral 74 16 97 % -- --   08/11/22 0450 -- -- -- -- -- -- -- 81 lb 12.8 oz (37.1 kg)   08/11/22 0414 -- -- -- 71 -- -- -- --   08/11/22 0334 126/67 98 °F (36.7 °C) Oral 80 20 97 % -- --   08/10/22 2339 135/82 97.4 °F (36.3 °C) Oral 97 20 100 % 4' 10\" (1.473 m) 84 lb (38.1 kg)   08/10/22 2338 -- -- -- -- -- 95 % -- --   08/10/22 2144 136/72 -- -- 89 24 -- -- --   08/10/22 2134 138/76 -- -- (!) 102 23 -- -- --   08/10/22 2114 (!) 142/82 -- -- (!) 102 21 -- -- --   08/10/22 2104 (!) 148/87 -- -- (!) 109 24 -- -- --   08/10/22 2044 (!) 150/82 -- -- (!) 101 22 -- -- --   08/10/22 1954 (!) 145/91 -- -- (!) 103 (!) 31 95 % -- --   08/10/22 1855 -- -- -- -- -- 92 % -- --   08/10/22 1803 (!) 146/74 98.1 °F (36.7 °C) -- (!) 102 18 98 % 4' 10\" (1.473 m) 85 lb 14.4 oz (39 kg)     Patient Vitals for the past 96 hrs (Last 3 readings):   Weight   08/11/22 0450 81 lb 12.8 oz (37.1 kg)   08/10/22 2339 84 lb (38.1 kg)   08/10/22 1803 85 lb 14.4 oz (39 kg)           Intake/Output Summary (Last 24 hours) at 8/11/2022 0950  Last data filed at 8/11/2022 0453  Gross per 24 hour   Intake 250 ml   Output 1510 ml   Net -1260 ml         Physical Exam: (2-7 system for EPF/Detailed, ?8 for Comprehensive)  /64   Pulse 74   Temp 97.6 °F (36.4 °C) (Oral)   Resp 16   Ht 4' 10\" (1.473 m)   Wt 81 lb 12.8 oz (37.1 kg)   SpO2 97%   BMI 17.10 kg/m²   Constitutional: vitals as above: alert, appears stated age and cooperative    Psychiatric: normal insight and judgment, oriented to person, place, time, and general circumstances    Head: Normocephalic, without obvious abnormality, atraumatic    Eyes:lids and lashes normal, conjunctivae and sclerae normal and pupils equal, round, reactive to light and accomodation    EMNT: external ears normal, nares midline    Neck: no carotid bruit, supple, symmetrical, trachea midline and thyroid not enlarged, symmetric, no tenderness/mass/nodules     Respiratory: crackles bilaterally with normal respiratory effort    Cardiovascular: normal rate, regular rhythm, normal S1 and S2 and no murmurs    Gastrointestinal: soft, non-tender, non-distended, normal bowel sounds, no masses or organomegaly    Extremities: no clubbing, trace  edema    Skin:No rashes or nodules noted. Neurologic:negative         Labs:  Lab Results   Component Value Date    WBC 8.0 08/11/2022    HGB 11.7 (L) 08/11/2022    HCT 34.1 (L) 08/11/2022     08/11/2022    ALT 84 (H) 08/11/2022    AST 57 (H) 08/11/2022     (L) 08/11/2022    K 4.0 08/11/2022    CL 96 (L) 08/11/2022    CREATININE 0.9 08/11/2022    BUN 25 (H) 08/11/2022    CO2 25 08/11/2022     Lab Results   Component Value Date    TROPONINI <0.01 08/10/2022       Recent Imaging Results are Reviewed:  XR CHEST PORTABLE    Result Date: 8/10/2022  EXAMINATION: ONE XRAY VIEW OF THE CHEST 8/10/2022 6:26 pm COMPARISON: None. HISTORY: ORDERING SYSTEM PROVIDED HISTORY: SOB TECHNOLOGIST PROVIDED HISTORY: Reason for exam:->SOB Reason for Exam: SHOB FINDINGS: The cardiac silhouette is enlarged. Aortic vascular calcification. There is vascular congestion with perihilar edema along with small bilateral effusions most consistent with CHF. Mild dependent atelectasis is suggested bilaterally.      CHF with cardiomegaly, perihilar edema and small bilateral effusions. CT CHEST PULMONARY EMBOLISM W CONTRAST    Result Date: 8/10/2022  EXAMINATION: CTA OF THE CHEST 8/10/2022 8:08 pm TECHNIQUE: CTA of the chest was performed after the administration of intravenous contrast.  Multiplanar reformatted images are provided for review. MIP images are provided for review. Automated exposure control, iterative reconstruction, and/or weight based adjustment of the mA/kV was utilized to reduce the radiation dose to as low as reasonably achievable. COMPARISON: None. HISTORY: ORDERING SYSTEM PROVIDED HISTORY: +SOB, +ddimer TECHNOLOGIST PROVIDED HISTORY: Reason for exam:->+SOB, +ddimer Decision Support Exception - unselect if not a suspected or confirmed emergency medical condition->Emergency Medical Condition (MA) Reason for Exam: +SOB, +ddimer FINDINGS: Pulmonary Arteries: Pulmonary arteries are adequately opacified for evaluation. No evidence of intraluminal filling defect to suggest pulmonary embolism. Main pulmonary artery is normal in caliber. Mediastinum: No evidence of mediastinal lymphadenopathy. The heart and pericardium demonstrate no acute abnormality. There is no acute abnormality of the thoracic aorta. Lungs/pleura: There is septal thickening, likely related to mild pulmonary vascular congestion. There are moderate bilateral pleural effusions. There is mild dependent and discoid atelectasis at the bilateral lung bases. There is bronchial wall thickening, likely related to small airway disease or bronchiolitis. There is mucous plugging. No pneumothorax. Upper Abdomen: Limited images of the upper abdomen are unremarkable. Soft Tissues/Bones: No acute bone or soft tissue abnormality. No evidence of pulmonary embolism Septal thickening, likely related to mild pulmonary vascular congestion. Moderate bilateral pleural effusions. Bronchial wall thickening, likely related to small airway disease or bronchiolitis. Mucous plugging. Lab Results   Component Value Date/Time    GLUCOSE 404 08/11/2022 05:05 AM     Lab Results   Component Value Date/Time    POCGLU 364 08/11/2022 08:42 AM     /64   Pulse 74   Temp 97.6 °F (36.4 °C) (Oral)   Resp 16   Ht 4' 10\" (1.473 m)   Wt 81 lb 12.8 oz (37.1 kg)   SpO2 97%   BMI 17.10 kg/m²     Assessment and Plan:  Principal Problem:    CHF (congestive heart failure) ([address] -Established problem. Improving. Feels better symptomatically  Plan: cont iv lasix. Await echo. Await cards eval  Active Problems:    DM2 (diabetes mellitus, type 2) ([address] -Established problem. Uncontrolled. Sugars high  Plan: add jardiance, sliding scale. Cont on ccc diet. At this time, would not want to start insulin, edna because she currently has no PCP and followup is unclear    Elevated liver function tests -Established problem. Stable. Remain elevated  Plan: ask GI to see, check ruq u/s    Anemia -Established problem. Stable. Plan: No indication for transfusion. Cont to monitor h/h to assess progression of anemia. Recommend ferrous sulfate or MVI as outpatient. Bilateral pleural effusion  Plan: repeat cxr ordered, cont iv lasix    Acute on chronic heart failure, unspecified heart failure type ([address]    Acute respiratory failure with hypoxia ([address] -Established problem. Improving. on RA now  Plan: Continue present orders/plan.      Suspected COVID-19 virus infection  Plan: await covid pcr result      (Please note that portions of this note were completed with a voice recognition program.  Efforts were made to edit the dictations but occasionally words are mis-transcribed.)        Misael Kaufman MD  8/11/2022

## 2022-08-11 NOTE — DISCHARGE INSTR - DIET
Good nutrition is important when healing from an illness, injury, or surgery. Follow any nutrition recommendations given to you during your hospital stay. If you were given an oral nutrition supplement while in the hospital, continue to take this supplement at home. You can take it with meals, in-between meals, and/or before bedtime. These supplements can be purchased at most local grocery stores, pharmacies, and chain super-stores. If you have any questions about your diet or nutrition, call the hospital and ask for the dietitian. [phone]        Heart Failure Nutrition Therapy  This diet will help you feel better and support your heart by reducing symptoms of fluid retention, shortness of breath and swelling. You should focus on:  Limiting sodium in your diet by reading labels and limiting foods high in sodium. Limit your daily sodium intake to 3,000 mg per day. Select foods with 140 mg of sodium or less per serving. Foods with more than 300 mg of sodium per serving may not fit into a reduced-sodium meal plan. Check serving sizes. If you eat more than 1 serving, you will get more sodium than the amount listed. Limiting fluid in your diet. Ask your doctor how much fluid you can have per day  Remember foods that are liquid at room temperature such as popsicles, soup, ice cream and Jell-O are fluids. Checking your weight to make sure you're not retaining too much fluid. Weigh yourself every morning. If you gain 3 or more pounds in one day or 5 pounds within 1 week, call your doctor. Foods to choose and avoid:  Avoid processed foods. Eat more fresh foods. Fresh and frozen fruits and vegetables are good choices. Choose fresh meats. Avoid processed meats such as singh, sausage and hot dogs. Do not add salt to your food while cooking or at the table. Try dry or fresh herbs, pepper, lemon juice, or a sodium-free seasoning blend such as Mrs. Dash to add flavor to food.    Do not use a salt substitute. Use caution at [employer] foods are high in sodium. Ask for your food to be cooked without salt and request sauces and dressing to come on the side.       Carbohydrate Controlled Diet Education  Counting carbohydrate servings may help you control your blood sugar. Foods with carbohydrates:  Breads, crackers and cereals  Pasta, rice and grains  Starchy vegetables such as potatoes, corn and peas  Beans and legumes  Milk, soy milk and yogurt  Fruits and fruit juices  Sweets such as cakes, cookies, ice cream, jam and jelly  Carbohydrate servings: In diabetes meal planning, 1 serving of a food with carbohydrate has about 15 grams of carbohydrate. Check serving sizes with measuring cups and spoons or a food scale. Read the Nutrition Facts on food labels to find out how many grams of carbohydrate are in foods you eat. Meal Planning Tips:  For many adults, eating 3 to 4 servings of carbohydrate foods at each meal and 1 or 2 carbohydrate servings for each snack works well. A healthy eating plan should include: At least 6 servings of fruits and non-starchy vegetables  At least 6 servings of grains, beans and starchy vegetables, with at least 3 servings from whole grains. At least 2 servings of milk or milk products  At least 4 to 6 ounces of meat or protein such as lean beef, lean pork, chicken, fish, low-fat cheese, or vegetarian choices such as soy. Include healthy fats such as olive oil, canola oil and nuts. Label reading tips: The nutrition facts panel on a label lists the grams of carbohydrate in 1 serving. Remember that one serving may contain more or less than 1 carb serving. To figure out how many carbohydrate servings are in food:  First, look at the label's standard serving size. Check the grams of total carbohydrate. This is the amount of carbohydrate in 1 standard serving. Divide the grams of total carbohydrate by 15.  This number equals the number of carbohydrate servings in 1 standard serving. Remember: 1 carbohydrate serving is 15 grams of carbohydrate.

## 2022-08-11 NOTE — PLAN OF CARE
Problem: Pain  Goal: Verbalizes/displays adequate comfort level or baseline comfort level  Outcome: Progressing     Problem: Safety - Adult  Goal: Free from fall injury  Outcome: Progressing     Problem: Hematologic - Adult  Goal: Maintains hematologic stability  Outcome: Progressing     Vitals:    08/10/22 2339   BP: 135/82   Pulse: 97   Resp: 20   Temp: 97.4 °F (36.3 °C)   SpO2: 100%   R/O COVID; droplet plus isolation in place as precautions. Patient admitted from emergency department via w/c on telemetry. Transferred to bed then bathroom per ED staff. Pt c/o pain 7/10 in left shoulder with movement/activity \"sore\" as a chronic pain but denies pain while laying still in bed without moving. Vital signs obtained. Orders reviewed and acknowledged. Oriented to room and environment. Questions answered. Bed placed in low position. Call light explained and within reach. Pt is a medium fall risk. Pt remains free from falls. Pt encouraged to use call light for needs throughout night; call light is within reach. Bed lock is in lowest position. Will continue to monitor throughout night.

## 2022-08-11 NOTE — CONSULTS
[address]  Advanced CHF/Pulmonary Hypertension   Cardiac Evaluation      Melania Cohn  YOB: 1945    Requesting PHysician:  Dr. Marlo Cutler      Chief Complaint   Patient presents with    Shortness of Breath     SOB, can not walk, no sleep for 2 days, if she walks its gets work. History of Present Illness:  Clementina Davenport is a 77 yo female who presented to the ED with shortness of breath that has occurred over the past 10 days but worse over the past 2 days. Denies cough. Denies leg swelling. She admits to orthopnea. No fever. She went to urgent care and was told she had hypoxia and was placed on nasal cannula oxygen and sent to ED. Rapid covid test was negative. No chest pain. She has history of DM. She is not on a statin, aspirin. Only on diabetic meds. She doesn't have a PCP as he just retired. Her labs in the ED showed elevated liver enzymes. No prior history of liver disease. No alcohol use. No abdominal pain, nausea, vomiting, melena, hematochezia. She has had 8 pound unintentional weight loss in the last year. No prior colonoscopy. Her echo shows low LVEF 15-20%. BNP elevated at 9808. Kidney functionis okay. LFTs are increased and she is mildly anemic. We are consulted for LV dysfunction. Today she is complaining of leg cramps that just started. Labs:  Sodium 134  K 4.0  BUN/cre 25/0.9  Glucose 404  Ldl 109, hdl 63  Alk 148  Alt 84, ast 57  Bnp 9808  Troponin < 0.01  H/H 11.7/34.1  Iron 87, 28% sat    Echo:  8/11/22:   Summary   -Overall left ventricular systolic function appears severely reduced.   -Ejection fraction is visually estimated to be 15-20%. -There is severe global hypokinesis with varying regional wall motion   abnormalities. -Grade II diastolic dysfunction with elevated LV filling pressures. e/e' =   16.8.   -Aortic valve appears sclerotic but opens adequately.   -Thickened mitral valve leaflets.    -Mild mitral regurgitation.   -Trivial tricuspid regurgitation.   -Trivial pericardial effusion. EKG:  sinus rhythm, poor R Wave progression    CXR:  Impression   CHF with cardiomegaly, perihilar edema and small bilateral effusions.      Meds prior to admission:  Synthroid  Janumet  Glipizide  Metformin  Glyburide  MVI    I/O's negative 1200 cc          Allergies   Allergen Reactions    Codeine      nausea  Other reaction(s): Vomiting     Current Facility-Administered Medications   Medication Dose Route Frequency Provider Last Rate Last Admin    alogliptin (NESINA) tablet 6.25 mg  6.25 mg Oral Daily Rafael Moseley MD   6.25 mg at 08/11/22 0912    metFORMIN (GLUCOPHAGE) tablet 500 mg  500 mg Oral BID  Rafael Moseley MD   500 mg at 08/11/22 0912    empagliflozin (JARDIANCE) tablet 25 mg  25 mg Oral Daily Rafael Moseley MD   25 mg at 08/11/22 1243    glucose-vitamin C chewable tablet 4 tablet  4 tablet Oral PRN Rafael Moseley MD        dextrose bolus 10% 125 mL  125 mL IntraVENous PRN Rafael Moseley MD        Or    dextrose bolus 10% 250 mL  250 mL IntraVENous PRN Rafael Moseley MD        glucagon (rDNA) injection 1 mg  1 mg SubCUTAneous PRN Rafael Moseley MD        dextrose 10 % infusion   IntraVENous Continuous PRN Rafael Moseley MD        insulin lispro (HUMALOG) injection vial 0-8 Units  0-8 Units SubCUTAneous TID  Rafael Moseley MD   8 Units at 08/11/22 1243    insulin lispro (HUMALOG) injection vial 0-4 Units  0-4 Units SubCUTAneous Nightly Rafael Moseley MD        therapeutic multivitamin-minerals 1 tablet  1 tablet Oral Daily Rafael Moseley MD   1 tablet at 08/11/22 0912    perflutren lipid microspheres (DEFINITY) injection 1.65 mg  1.5 mL IntraVENous ONCE PRN Rafael Moseley MD        sodium chloride flush 0.9 % injection 5-40 mL  5-40 mL IntraVENous 2 times per day Rafael Moseley MD   10 mL at 08/11/22 0912    sodium chloride flush 0.9 % injection 10 mL  10 mL IntraVENous PRN Rafael Moseley MD   10 mL at 08/11/22 0021    0.9 % sodium chloride infusion   IntraVENous PRN Lida Joe MD        ondansetron (ZOFRAN-ODT) disintegrating tablet 4 mg  4 mg Oral Q8H PRN Lida Joe MD        Or    ondansetron [facility] injection 4 mg  4 mg IntraVENous Q6H PRN Lida Joe MD        magnesium hydroxide (MILK OF MAGNESIA) 400 MG/5ML suspension 30 mL  30 mL Oral Daily PRN Lida Joe MD        acetaminophen (TYLENOL) tablet 650 mg  650 mg Oral Q6H PRN Lida Joe MD        Or    acetaminophen (TYLENOL) suppository 650 mg  650 mg Rectal Q6H PRN Lida Joe MD        enoxaparin (LOVENOX) injection 40 mg  40 mg SubCUTAneous Daily Lida Joe MD   40 mg at 08/11/22 0911    furosemide (LASIX) injection 40 mg  40 mg IntraVENous TID Lida Joe MD   40 mg at 08/11/22 0912    hydrALAZINE (APRESOLINE) injection 10 mg  10 mg IntraVENous Q6H PRN Lida Joe MD        0.9 % sodium chloride bolus  500 mL IntraVENous PRN Lida Joe MD        potassium chloride (KLOR-CON M) extended release tablet 40 mEq  40 mEq Oral PRN Lida Joe MD        Or    potassium bicarb-citric acid (EFFER-K) effervescent tablet 40 mEq  40 mEq Oral PRN Lida Joe MD        Or    potassium chloride 10 mEq/100 mL IVPB (Peripheral Line)  10 mEq IntraVENous PRN Lida Joe MD           Past Medical History:   Diagnosis Date    Diabetes mellitus [facility])     Hypothyroid      History reviewed. No pertinent surgical history. History reviewed. No pertinent family history.   Social History     Socioeconomic History    Marital status:      Spouse name: Not on file    Number of children: Not on file    Years of education: Not on file    Highest education level: Not on file   Occupational History    Not on file   Tobacco Use    Smoking status: Never    Smokeless tobacco: Not on file   Substance and Sexual Activity    Alcohol use: No    Drug use: Not on file    Sexual activity: Not on file   Other Topics Concern    Not on file   Social History Narrative    Not on file     Social Determinants of Health     Financial Resource Strain: Not on file   Food Insecurity: Not on file   Transportation Needs: Not on file   Physical Activity: Not on file   Stress: Not on file   Social Connections: Not on file   Intimate Partner Violence: Not on file   Housing Stability: Not on file       Review of Systems:   Constitutional: there has been no unanticipated weight loss. There's been no change in energy level, sleep pattern, or activity level. Eyes: No visual changes or diplopia. No scleral icterus. ENT: No Headaches, hearing loss or vertigo. No mouth sores or sore throat. Cardiovascular: Reviewed in HPI  Respiratory: No cough or wheezing, no sputum production. No hematemesis. Gastrointestinal: No abdominal pain, appetite loss, blood in stools. No change in bowel or bladder habits. Genitourinary: No dysuria, trouble voiding, or hematuria. Musculoskeletal:  No gait disturbance, weakness or joint complaints. Integumentary: No rash or pruritis. Neurological: No headache, diplopia, change in muscle strength, numbness or tingling. No change in gait, balance, coordination, mood, affect, memory, mentation, behavior. Psychiatric: No anxiety, no depression. Endocrine: No malaise, fatigue or temperature intolerance. No excessive thirst, fluid intake, or urination. No tremor. Hematologic/Lymphatic: No abnormal bruising or bleeding, blood clots or swollen lymph nodes. Allergic/Immunologic: No nasal congestion or hives. Physical Examination:    Vitals:    08/11/22 0450 08/11/22 0701 08/11/22 0857 08/11/22 1230   BP:  112/64  (!) 90/56   Pulse:  74 74 82   Resp:  16  18   Temp:  97.6 °F (36.4 °C)  97.1 °F (36.2 °C)   TempSrc:  Oral  Oral   SpO2:  97%  98%   Weight: 81 lb 12.8 oz (37.1 kg)      Height:         Body mass index is 17.1 kg/m².      Wt Readings from Last 3 Encounters:   08/11/22 81 lb 12.8 oz (37.1 kg)     BP Readings from Last 3 Encounters:   08/11/22 (!) 90/56     Constitutional and General Appearance:   Chronically ill appearing, thin female  HEENT:  NC/AT  TARAN  No problems with hearing  Skin:  Warm, dry  Respiratory:  Normal excursion and expansion without use of accessory muscles  Resp Auscultation: Normal breath sounds without dullness  Cardiovascular: The apical impulses not displaced  Heart tones are crisp and normal  Cervical veins are not engorged  The carotid upstroke is normal in amplitude and contour without delay or bruit  JVP less than 8 cm H2O  RRR with nl S1 and S2 without m,r,g  Peripheral pulses are symmetrical and full  There is no clubbing, cyanosis of the extremities. No edema  Femoral Arteries: 2+ and equal  Pedal Pulses: 2+ and equal   Neck:  No thyromegaly  Abdomen:  No masses or tenderness  Liver/Spleen: No Abnormalities Noted  Neurological/Psychiatric:  Alert and oriented in all spheres  Moves all extremities well  Exhibits normal gait balance and coordination  No abnormalities of mood, affect, memory, mentation, or behavior are noted    Labs were reviewed including labs from other hospital systems through [facility]. Cardiac testing was reviewed including echos, nuclear scans, cardiac catheterization, including from other hospital systems through [facility]. Assessment:    1. Acute pulmonary edema (HCC)    2. Shortness of breath    3. Person under investigation for COVID-19    4. Hyperglycemia    5. Bilateral pleural effusion     6. New onset acute systolic HF  7. Diabetes  8. Need to rule out CAD as cause for systolic HF  9. Leg cramping    Plan:   Hold IV lasix for now due to leg cramps. Start po lasix tomorrow  Blood pressure now low suggesting that she is dehydrated  Need interventional cardiology to see her for cardiac cath.   Given her diabetes and wall motion abnormalities, the most likely cause of her low EF is CAD  Keep npo after midnight in case they want to cath her tomorrow  Need to start lisinopril and beta blocker, but blood pressure currently too low  Diabetes management per primary team  [facility] Oh is good option for her with DM and HF  Add spironolactone before discharge  GI seeing her for elevated LFTs and weight loss/diarrhea  CHF education reinforced. ~salt restriction  ~fluid restriction  ~medication compliance  ~daily weights and notify of any significant weight gain/loss  ~establish with CHF nurse  ~outpatient follow-up with our CHF team    The patient was seen for > 70 minutes. I reviewed interval history, physical exam, review of data including labs, imaging, development and implementation of treatment plan and coordination of complex care. More than 50% of the time was devoted to counseling the patient on their diagnoses/treatments, as well as coordination of care with the other care teams      I appreciate the opportunity of cooperating in the care of this patient.     Michelle Rosales M.D., [facility] [place]

## 2022-08-11 NOTE — PLAN OF CARE
Problem: Hematologic - Adult  Goal: Maintains hematologic stability  Outcome: Progressing     Problem: Pain  Goal: Verbalizes/displays adequate comfort level or baseline comfort level  Outcome: Progressing     Problem: Safety - Adult  Goal: Free from fall injury  Outcome: Progressing     Vitals:    08/11/22 0334   BP: 126/67   Pulse: 80   Resp: 20   Temp: 98 °F (36.7 °C)   SpO2: 97%     Pt was asleep in bed with lights dimmed in room. Droplet plus precautions remain in place for r/o COVID-19. Pt denies pain. VSS; see all flowsheets. Pt is a medium fall risk. Pt remains free from falls, throughout night. Pt encouraged to use call light for needs throughout night; call light is within reach. Bed lock is in lowest position. Will continue to monitor throughout night.

## 2022-08-11 NOTE — PROGRESS NOTES
Patient shared thoughts and feeling about her health. Patient requested prayer over the phone for healing.  prayed as requested. Patient was thankful.

## 2022-08-11 NOTE — H&P
History and Physical  Dr. Vinh Grove  8/10/2022    PCP: Neville Cota MD    Cc:   Chief Complaint   Patient presents with    Shortness of Breath     SOB, can not walk, no sleep for 2 days, if she walks its gets work. HPI:  Rebeca Ruby is a 78 y.o. female who has a past medical history of Diabetes mellitus ([address] and Hypothyroid. Patient presents with CHF (congestive heart failure) ([facility] [address] HPI  (1-3 for expanded problem focused, ?4 for detailed/comprehensive)     78 y.o. female  who presents to the ED complaining of shortness of breath, really over the past 10 days or so but particularly bad over the past 2 days. She has not had much coughing. Denies any leg swelling. She is worried about heart failure given that she lifted up and has orthopnea. She also has not had a fever. She went to urgent care and was told she was hypoxic and placed on nasal cannula oxygen for several hours and then sent here. She had a rapid COVID test there that was negative. She is not having chest pain or belly pain currently. She apparently has a history of DM. On oral meds. Currently between PCPs    Per ER note: \"The patient's ED workup was notable for concern for new diagnosis of heart failure with pulmonary edema and bilateral pleural effusions. Not frankly hypoxic here but due to work of breathing and ambulatory pulse oxygenation she was placed on nasal cannula oxygen with improvement of her symptoms of shortness of breath. Her BNP is notably elevated and imaging does demonstrate some fluid overload although not really a lot of peripheral edema on exam.  As such she will be diuresed and given nitroglycerin to augment the Lasix. She does not have a leukocytosis. Initial lactate mildly elevated but this is more attributable to her respiratory symptoms and heart failure and not sepsis, I do not suspect bacterial infection.   Procalcitonin is also normal.  Rapid COVID-negative at urgent care prior to arrival.  I did obtain a PCR to rule this out here although suspicion for infection is lower. \"    Pt to be palaced in droplet isolation given r/o covid status  Pt to be maintained on o2 as requiring 2L    Problem list of hospitalization thus far: Active Hospital Problems    Diagnosis     CHF (congestive heart failure) (HCC) [I50.9]      Priority: Medium    DM2 (diabetes mellitus, type 2) ([facility] [address] [E11.9]      Priority: Medium    Elevated liver function tests [R79.89]      Priority: Medium    Anemia [D64.9]      Priority: Medium    Bilateral pleural effusion [J90]      Priority: Medium    Acute on chronic heart failure, unspecified heart failure type ([facility] [address] [I50.9]      Priority: Medium    Acute respiratory failure with hypoxia ([facility] [J96.01]      Priority: Medium    Suspected COVID-19 virus infection [Z20.822]      Priority: Medium         Review of Systems: (1 system for EPF, 2-9 for detailed, 10+ for comprehensive)  Constitutional: Negative for chills and fever. HENT: Negative for dental problem, nosebleeds and rhinorrhea. Eyes: Negative for photophobia and visual disturbance. Respiratory: Negative for cough, chest tightness and postitive for shortness of breath. Cardiovascular: Negative for chest pain and leg swelling. Gastrointestinal: Negative for diarrhea, nausea and vomiting. Endocrine: Negative for polydipsia and polyphagia. Genitourinary: Negative for frequency, hematuria and urgency. Musculoskeletal: Negative for back pain and myalgias. Skin: Negative for rash. Allergic/Immunologic: Negative for food allergies. Neurological: Negative for dizziness, seizures, syncope and facial asymmetry. Hematological: Negative for adenopathy. Psychiatric/Behavioral: Negative for dysphoric mood. The patient is not nervous/anxious.              Past Medical History:   Past Medical History:   Diagnosis Date    Diabetes mellitus ([facility] [address]     Hypothyroid        Past Surgical History: History reviewed. No pertinent surgical history. Social History:   Social History       Tobacco History       Smoking Status  Never      Smokeless Tobacco Use  Unknown              Alcohol History       Alcohol Use Status  No              Drug Use       Drug Use Status  Not Asked              Sexual Activity       Sexually Active  Not Asked                    Fam History: History reviewed. No pertinent family history. PFSH: The above PMHx, PSHx, SocHx, FamHx has been reviewed by myself. (1 area for detailed, 2-3 for comprehensive)      Code Status: No Order    Meds - following list of home medications fromelectronic chart has been reviewed by myself  Prior to Admission medications    Medication Sig Start Date End Date Taking? Authorizing Provider   sitaGLIPtan-metFORMIN (JANUMET)  MG per tablet Janumet 50 mg-1,000 mg tablet    Historical Provider, MD   glipiZIDE (GLUCOTROL) 5 MG tablet glipizide 5 mg tablet    Historical Provider, MD   cephALEXin (KEFLEX) 500 MG capsule cephalexin 500 mg capsule  Patient not taking: Reported on 8/10/2022  8/10/22  Historical Provider, MD   clindamycin (CLEOCIN) 300 MG capsule clindamycin HCl 300 mg capsule  Patient not taking: Reported on 8/10/2022  8/10/22  Historical Provider, MD   metformin (GLUCOPHAGE) 500 MG tablet Take 1,000 mg by mouth 2 times daily (with meals). Historical Provider, MD   glyBURIDE (DIABETA) 5 MG tablet Take 5 mg by mouth 2 times daily (with meals). 8/11/10   Historical Provider, MD   therapeutic multivitamin-minerals (THERAGRAN-M) tablet Take 1 tablet by mouth daily.     Historical Provider, MD         Allergies   Allergen Reactions    Codeine      nausea  Other reaction(s): Vomiting             EXAM: (2-7 system for EPF/Detailed, ?8 for Comprehensive)  /72   Pulse 89   Temp 98.1 °F (36.7 °C)   Resp 24   Ht 4' 10\" (1.473 m)   Wt 85 lb 14.4 oz (39 kg)   SpO2 95%   BMI 17.95 kg/m²   Constitutional: vitals as above: alert, appears stated age and cooperative    Psychiatric: normal insight and judgment, oriented to person, place, time, and general circumstances    Head: Normocephalic, without obvious abnormality, atraumatic    Eyes:lids and lashes normal, conjunctivae and sclerae normal and pupils equal, round, reactive to light and accomodation    EMNT: external ears normal, nares midline    Neck: no carotid bruit, supple, symmetrical, trachea midline and thyroid not enlarged, symmetric, no tenderness/mass/nodules     Respiratory: crackles bilaterally with normal respiratory effort    Cardiovascular: normal rate, regular rhythm, normal S1 and S2 and no murmurs    Gastrointestinal: soft, non-tender, non-distended, normal bowel sounds, no masses or organomegaly    Extremities: no clubbing, 1+ edema    Skin:No rashes or nodules noted.     Neurologic:negative         LABS:  Labs Reviewed   CBC WITH AUTO DIFFERENTIAL - Abnormal; Notable for the following components:       Result Value    RBC 3.41 (*)     Hemoglobin 10.7 (*)     Hematocrit 31.2 (*)     All other components within normal limits   COMPREHENSIVE METABOLIC PANEL W/ REFLEX TO MG FOR LOW K - Abnormal; Notable for the following components:    Sodium 134 (*)     Glucose 372 (*)     BUN 22 (*)     GFR Non-African American 54 (*)     Alkaline Phosphatase 149 (*)     ALT 90 (*)     AST 67 (*)     All other components within normal limits   BRAIN NATRIURETIC PEPTIDE - Abnormal; Notable for the following components:    Pro-BNP 9,808 (*)     All other components within normal limits   LACTATE, SEPSIS - Abnormal; Notable for the following components:    Lactic Acid, Sepsis 2.4 (*)     All other components within normal limits   D-DIMER, QUANTITATIVE - Abnormal; Notable for the following components:    D-Dimer, Quant 1.42 (*)     All other components within normal limits   CULTURE, BLOOD 1   CULTURE, BLOOD 2   TROPONIN   LACTATE, SEPSIS   PROCALCITONIN   COVID-19         IMAGING:  Imaging results from the ER have been reviewed in the computerized chart. XR CHEST PORTABLE    Result Date: 8/10/2022  EXAMINATION: ONE XRAY VIEW OF THE CHEST 8/10/2022 6:26 pm COMPARISON: None. HISTORY: ORDERING SYSTEM PROVIDED HISTORY: SOB TECHNOLOGIST PROVIDED HISTORY: Reason for exam:->SOB Reason for Exam: SHOB FINDINGS: The cardiac silhouette is enlarged. Aortic vascular calcification. There is vascular congestion with perihilar edema along with small bilateral effusions most consistent with CHF. Mild dependent atelectasis is suggested bilaterally. CHF with cardiomegaly, perihilar edema and small bilateral effusions. CT CHEST PULMONARY EMBOLISM W CONTRAST    Result Date: 8/10/2022  EXAMINATION: CTA OF THE CHEST 8/10/2022 8:08 pm TECHNIQUE: CTA of the chest was performed after the administration of intravenous contrast.  Multiplanar reformatted images are provided for review. MIP images are provided for review. Automated exposure control, iterative reconstruction, and/or weight based adjustment of the mA/kV was utilized to reduce the radiation dose to as low as reasonably achievable. COMPARISON: None. HISTORY: ORDERING SYSTEM PROVIDED HISTORY: +SOB, +ddimer TECHNOLOGIST PROVIDED HISTORY: Reason for exam:->+SOB, +ddimer Decision Support Exception - unselect if not a suspected or confirmed emergency medical condition->Emergency Medical Condition (MA) Reason for Exam: +SOB, +ddimer FINDINGS: Pulmonary Arteries: Pulmonary arteries are adequately opacified for evaluation. No evidence of intraluminal filling defect to suggest pulmonary embolism. Main pulmonary artery is normal in caliber. Mediastinum: No evidence of mediastinal lymphadenopathy. The heart and pericardium demonstrate no acute abnormality. There is no acute abnormality of the thoracic aorta. Lungs/pleura: There is septal thickening, likely related to mild pulmonary vascular congestion. There are moderate bilateral pleural effusions.   There is mild dependent and discoid atelectasis at the bilateral lung bases. There is bronchial wall thickening, likely related to small airway disease or bronchiolitis. There is mucous plugging. No pneumothorax. Upper Abdomen: Limited images of the upper abdomen are unremarkable. Soft Tissues/Bones: No acute bone or soft tissue abnormality. No evidence of pulmonary embolism Septal thickening, likely related to mild pulmonary vascular congestion. Moderate bilateral pleural effusions. Bronchial wall thickening, likely related to small airway disease or bronchiolitis. Mucous plugging. EKG:   EKG from ER, reviewed by self - it shows normal sinus at 99. No st elevation  Old chart reviewed, no old EKG to review      Lab Results   Component Value Date/Time    GLUCOSE 372 08/10/2022 06:50 PM     No results found for: POCGLU  /72   Pulse 89   Temp 98.1 °F (36.7 °C)   Resp 24   Ht 4' 10\" (1.473 m)   Wt 85 lb 14.4 oz (39 kg)   SpO2 95%   BMI 17.95 kg/m²     MEDICAL DECISION MAKING:    Principal Problem:    CHF (congestive heart failure) (HCC) -New Problem to me. Apparent chf exac  Plan: Place on telemetry floor. Start on iv diuretics. Monitor strict I/o. Check ECHO. Cards asked to see. Trend cardiac enzymes. Active Problems:    DM2 (diabetes mellitus, type 2) ([address] -Established problem. Stable. Glu 372  Plan: Patient placed on controlled carbohydrate diet. Fingerstick sugars to be checked to monitor for both hypoglycemia as well as hyperglycemia. Sliding scale insulin ordered. Glucagon and dextrose ordered for hypoglycemia. Patient will be continued on home medications. Hemoglobin a1c to be ordered to assess efficacy of therapy. Elevated liver function tests -Established problem. Stable. Plan: repeat LFTs in AM    Anemia -Established problem. Stable. Plan: No indication for transfusion. Cont to monitor h/h to assess progression of anemia. Recommend ferrous sulfate or MVI as outpatient.      Bilateral pleural effusion -Established problem. Stable. Plan: iv lasix now, repeat imaging    Acute on chronic heart failure, unspecified heart failure type ([address]     Acute respiratory failure with hypoxia ([address] -Established problem. Stable. New 2L o2  Plan: cont o2    Suspected COVID-19 virus infection -Established problem. Stable. Plan: place in droplet plus isolation. Await pcr result        I have discussed with the patient the rationale for blood component transfusion; its benefits in treating or preventing fatigue, organ damage, or death; and its risk which includes mild transfusion reactions, rare risk of blood borne infection, or more serious but rare reactions. I have discussed the alternatives to transfusion, including the risk and consequences of not receiving transfusion. The patient had an opportunity to ask questions and had agreed to proceed with transfusion of blood components. Diagnoses as listed above, designated as new or established and plan outlined for each. Data Reviewed:   (1) Lab tests were reviewed or ordered. (1) Radiology tests were reviewed or ordered. (1) Medical test (Echo, EKG, PFT/nancy) were ordered. (1)History was not obtained from someone other than patient  (1) Old records were reviewed - see HPI/MDM for pertinent details if review done. (2) Case was discussed with another health care provider: Dr Charlee Weiss  (2) Imaging was viewed by myself. (2) EKG  was viewed by myself. The patient is being placed in inpatient status with the expectation of requiring a hospital stay spanning at least two midnights for care and treatment of the problems noted in the problem list.  This determination is also based on thepatients comorbidities and past medical history, the severity and timing of the signs and symptoms upon presentation.     (Please note that portions of this note were completed with a voice recognition program.  Efforts were made to edit the dictations but occasionally words are mis-transcribed.)      Electronically signed by: Goyo Donnelly MD 8/10/2022

## 2022-08-11 NOTE — PLAN OF CARE
Problem: Metabolic/Fluid and Electrolytes - Adult  Goal: Glucose maintained within prescribed range  Outcome: Progressing     Pt reports that she used to check her blood glucose 3 times a day, but she hasn't been checking it lately because she has not been feeling well among other reasons.  Pt states that her blood sugar used to be approx 160s

## 2022-08-11 NOTE — CONSULTS
Nutrition Note    Consult received for: pt requests education on diabetic diet (pt also has new onset CHF). Pt currently in Droplet Plus isolation. Call placed to pt for telephone education; no answer. Will place written education in AVS and follow for verbal education prior to d/c.     Electronically signed by Myles Salgado RD, LD on 8/11/22 at 1:21 PM EDT  Contact: [phone]

## 2022-08-11 NOTE — CONSULTS
Gastroenterology Consult Note        Patient: Elisabet Arevalo  : 1945  Acct#:      Date:  2022    Subjective:       History of Present Illness  Patient is a 78 y.o.  female admitted with Shortness of breath [R06.02]  Acute pulmonary edema (HCC) [J81.0]  Hyperglycemia [R73.9]  Bilateral pleural effusion [J90]  Acute on chronic heart failure, unspecified heart failure type ([facility] [address] [I50.9]  Person under investigation for COVID-19 [Z20.822] who is seen in consult for elevated liver enzymes. History of diabetes, hypothyroidism. She presented to the ER yesterday with shortness of breath for 2 days. COVID was negative. Admitted with CHF. Echo is pending. Noted to have elevated liver enzymes so GI consulted. No prior history of liver disease. She denies prior alcohol use. No abdominal pain, nausea, vomiting, melena, hematochezia. She reports 1 year history of passing multiple mushy bowel movements after eating. Has had 8 pound unintentional weight loss in the past year. No prior colonoscopy. Past Medical History:   Diagnosis Date    Diabetes mellitus ([facility] [address]     Hypothyroid       History reviewed. No pertinent surgical history. Past Endoscopic History: none    Admission Meds  No current facility-administered medications on file prior to encounter. Current Outpatient Medications on File Prior to Encounter   Medication Sig Dispense Refill    levothyroxine (SYNTHROID) 50 MCG tablet Take 50 mcg by mouth in the morning. sitaGLIPtan-metFORMIN (JANUMET)  MG per tablet Take 0.5 tablets by mouth 2 times daily (with meals)      glipiZIDE (GLUCOTROL) 5 MG tablet Take 5 mg by mouth in the morning and 5 mg in the evening. Take before meals. metformin (GLUCOPHAGE) 500 MG tablet Take 1,000 mg by mouth 2 times daily (with meals). (Patient not taking: Reported on 2022)      glyBURIDE (DIABETA) 5 MG tablet Take 5 mg by mouth 2 times daily (with meals).  (Patient not taking: Reported on 8/11/2022)      therapeutic multivitamin-minerals (THERAGRAN-M) tablet Take 1 tablet by mouth daily. Allergies  Allergies   Allergen Reactions    Codeine      nausea  Other reaction(s): Vomiting      Social   Social History     Tobacco Use    Smoking status: Never    Smokeless tobacco: Not on file   Substance Use Topics    Alcohol use: No        History reviewed. No pertinent family history. Review of Systems  Constitutional: negative for fevers, chills, sweats    Ears, nose, mouth, throat, and face: negative for nasal congestion and sore throat   Respiratory: negative for cough +  shortness of breath   Cardiovascular: negative for chest pain and dyspnea   Gastrointestinal: see hpi   Genitourinary:negative for dysuria and frequency   Integument/breast: negative for pruritus and rash   Hematologic/lymphatic: negative for bleeding and easy bruising   Musculoskeletal:negative for arthralgias and myalgias   Neurological: negative for dizziness and weakness       Physical Exam  Blood pressure 112/64, pulse 74, temperature 97.6 °F (36.4 °C), temperature source Oral, resp. rate 16, height 4' 10\" (1.473 m), weight 81 lb 12.8 oz (37.1 kg), SpO2 97 %. General appearance: very thin, alert, cooperative, no distress, appears stated age  Eyes: Anicteric  Head: Normocephalic, without obvious abnormality  Lungs: clear to auscultation bilaterally, Normal Effort  Heart: regular rate and rhythm, normal S1 and S2, no murmurs or rubs  Abdomen: soft, non-tender. Bowel sounds normal. No masses,  no organomegaly.    Extremities: atraumatic, no cyanosis or edema  Skin: warm and dry, no jaundice  Neuro: Grossly intact, A&OX3  Musculoskeletal: 5/5  strength BUE      Data Review:    Recent Labs     08/10/22  1850 08/11/22  0505   WBC 7.6 8.0   HGB 10.7* 11.7*   HCT 31.2* 34.1*   MCV 91.3 90.4    291     Recent Labs     08/10/22  1850 08/11/22  0505   * 134*   K 4.6 4.0    96*   CO2 22 25 BUN 22* 25*   CREATININE 1.0 0.9     Recent Labs     08/10/22  1850 08/11/22  0505   AST 67* 57*   ALT 90* 84*   BILIDIR  --  <0.2   BILITOT 0.5 0.6   ALKPHOS 149* 148*     No results for input(s): LIPASE, AMYLASE in the last 72 hours. No results for input(s): PROTIME, INR in the last 72 hours. No results for input(s): PTT in the last 72 hours. No results for input(s): OCCULTBLD in the last 72 hours. Imaging Studies:                            CTA chest 8/10/22  Impression   No evidence of pulmonary embolism       Septal thickening, likely related to mild pulmonary vascular congestion. Moderate bilateral pleural effusions. Bronchial wall thickening, likely related to small airway disease or   bronchiolitis. Mucous plugging. Echo 8/11/22   Summary    -Overall left ventricular systolic function appears severely reduced.    -Ejection fraction is visually estimated to be 15-20%. -There is severe global hypokinesis with varying regional wall motion    abnormalities. -Grade II diastolic dysfunction with elevated LV filling pressures. e/e' =    16.8.    -Aortic valve appears sclerotic but opens adequately.    -Thickened mitral valve leaflets. -Mild mitral regurgitation.    -Trivial tricuspid regurgitation.    -Trivial pericardial effusion. Assessment:     Principal Problem:    CHF (congestive heart failure) (HCC)  Active Problems:    DM2 (diabetes mellitus, type 2) (HCC)    Elevated liver function tests    Anemia    Bilateral pleural effusion    Acute on chronic heart failure, unspecified heart failure type (HCC)    Acute respiratory failure with hypoxia ([address]    Suspected COVID-19 virus infection  Resolved Problems:    * No resolved hospital problems. *    Elevated liver enzymes-Per review of care everywhere, liver enzymes were normal in 2018. Now AST 67, ALT 90, alk phos 149 with normal bilirubin. Transaminases improved a little bit today.   Right upper quadrant ultrasound has been ordered. Consider passive congestion from heart failure. Altered bowel habits -patient reports 1 year history of passing mushy bowel movements after eating. Has also had 8 pound unintentional weight loss. No prior colonoscopy. CHF - ECHO as above, EF 15-20%.      This a very pleasant 79-year-old female who we are asked to see today because of elevated liver enzyme  She was seen at the bedside with our certified physicians assistant  Not only does she have liver enzyme elevation  She also has issues with going to the bathroom after she eats she had several bowel movements which are all liquidy or mushy  Patient originally was admitted to the hospital because she was having problems with congestive heart failure  Or shortness of breath and then she underwent evaluation she has been found to have a ejection fraction by 2D echo 15 to 20%    To help us with her elevated liver enzymes we need to see an acute hepatitis panel we definitely want to see a right upper quadrant    Her frequent bowel movements and weight loss this could be due to pancreatic insufficiency she could have a malabsorption issue  We definitely need to see a fecal calprotectin and a pancreatic fecal elastase  She is going to need a colonoscopy at some point he    We will see if she responds to treatment for her congestive heart failure    Then that we will gauge whether or not we can do a colonoscopy    Thank you for this very kind referral today  Recommendations:   -Monitor liver enzymes  -Follow-up hepatitis panel  -Follow-up right upper quadrant ultrasound  - check fecal elastase and calprotectin   -consider colonoscopy at some point for altered bowel habits if she is felt to be a candidate given her CHF    Discussed with Dr. Alonso Lozano, [address] [place] [address]

## 2022-08-11 NOTE — PROGRESS NOTES
[facility] Department   Phone: [phone]    Physical Therapy    [x] Initial Evaluation            [] Daily Treatment Note         [] Discharge Summary      Patient: Nerissa Mckeon   : 1945   MRN: [id number]   Date of Service:  2022  Admitting Diagnosis: CHF (congestive heart failure) ([facility]  Current Admission Summary: Pt was admitted with SOB and BLE pain. Diagnosed with CHF. COVID R/O at this time. Past Medical History:  has a past medical history of Diabetes mellitus ([facility] and Hypothyroid. Past Surgical History:  has no past surgical history on file. Discharge Recommendations: Hiwot Gooden scored a 22/24 on the AM-PAC short mobility form. Current research shows that an AM-PAC score of 18 or greater is typically associated with a discharge to the patient's home setting. Based on the patient's AM-PAC score and their current functional mobility deficits, it is recommended that the patient have 2-3 sessions per week of Physical Therapy at d/c to increase the patient's independence. At this time, this patient demonstrates the endurance and safety to discharge home with home services and a follow up treatment frequency of 2-3x/wk. Please see assessment section for further patient specific details. If patient discharges prior to next session this note will serve as a discharge summary. Please see below for the latest assessment towards goals. HOME HEALTH CARE: LEVEL 1 STANDARD    - Initial home health evaluation to occur within 24-48 hours, in patient home   - Therapy to evaluate with goal of regaining prior level of functioning   - Therapy to evaluate if patient has [address] needs for personal care    DME Required For Discharge: single point cane  Precautions/Restrictions: medium fall risk, .   Comment: pt up independently in room  Weight Bearing Restrictions: weight bearing as tolerated  [] Right Upper Extremity  [] Left Upper Extremity [] Right Lower Extremity  [] Left Lower Extremity     Required Braces/Orthotics: no braces required   [] Right  [] Left  Positional Restrictions:no positional restrictions    Pre-Admission Information   Lives With: spouse, son (son is 47 yo)                       Type of Home: house  Home Layout: two level, 1/2 bath on main level, 13 stairs to 2nd level with ? HR, . Comment: bathroom with shower on second floor  Home Access:  2 step to enter without rails   Bathroom Layout: walk in shower, . Comment: built in shower bench  Toilet Height: standard height  Bathroom Equipment: shower chair, . Comment: pt has a shower chair for   Home Equipment: rolling walker, . Comment: pt's  has a walker, pt's  owns a [facility] but he uses the [facility]  Transfer Assistance: Independent without use of device  Ambulation Assistance:Independent without use of device, . Comment: pt c/o difficulties with walking in the previous two weeks  ADL Assistance: independent with all ADL's  IADL Assistance: requires assistance with cleaning, requires assistance with shopping Comment: Pt reports that she used to check her blood glucose 3 times a day, but she hasn't been checking it lately because she has not been feeling well among other reasons. Pt reported a home health aide to assistance with cleaning. Pt stated her son and  do not help at home. Active :        [x] Yes                 [] No comment: recently received assistance with driving d/t fear of walking in a parking lot  Hand Dominance: [] Left                 [x] Right  Current Employment: retired  Recent Falls: Pt denies recent falls  Comment: Pt's son able to provided 24/7 SUP, pt stated his son works from home, pt reports his son does not help often. Pt stated her  required assistance a few months ago but currently does not require assistance for ADLs.     Examination   Vision:   Vision Corrective Device: wears glasses for reading  Hearing:   hard of hearing -L hearing aide    Posture:   Good  Sensation:   accurately detects gross touch to all extremities -pt reported intermittent numbness to BLEs depending on her position   Proprioception:    WFL  Tone:   Normotonic  Coordination Testing:   WFL    ROM:   (B) LE AROM WFL  Strength:   (B) LE strength grossly WFL  Decision Making: low complexity  Clinical Presentation: evolving      Subjective  General: The pt was standing independently in her room upon PT/OT arrival, eager to work with therapy, stated she needs therapy because she has felt \"not myself\" over the last two weeks including BLE cramping and SOB, pt also stated she has chronic L shoulder pain that she would like to have evaluated   Pain: 0/10 -pt denied pain at rest but reported B calf pain with ambulation (for the last 2 weeks)  Pain Interventions: not applicable       Functional Mobility  Bed Mobility  Bed mobility not completed on this date. Comments:  Transfers  Sit to stand transfer: supervision  Stand to sit transfer: supervision  Comments:  Ambulation  Assistive Device: no device  Assistance: stand by assistance  Distance: 40'  Gait Mechanics: slow sandor, decreased step length, hesitant to bear weight on feet  Comments:  PT educated the pt on the use a cane for ambulation to decrease calf pain, pt stated she would want to try that next session  Stair Mobility  Stair mobility not completed on this date. Comments:  Wheelchair Mobility:  No w/c mobility completed on this date. Comments:  Balance  Static Sitting Balance: good(+): independent with high level dynamic balance in unsupported position  Dynamic Sitting Balance: good(+): independent with high level dynamic balance in unsupported position  Static Standing Balance: good: independent with functional balance in unsupported position  Dynamic Standing Balance: fair (+): maintains balance at SBA/supervision without use of UE support  Comments:  The pt changed her socks independently sitting EOB.  She toileted and then changed her underwear in standing with CGA for balance, leaning on the sink. Other Therapeutic Interventions    Functional Outcomes  AM-PAC Inpatient Mobility Raw Score : 22              Cognition  WFL  Orientation:    alert and oriented x 4  Command Following:   [facility]    Education  Barriers To Learning: hearing  Patient Education: patient educated on PT role and benefits, plan of care, adaptive device training, discharge recommendations  Learning Assessment:  patient verbalizes and demonstrates understanding    Assessment  Activity Tolerance: Good -distance for gait limited by calf pain  Impairments Requiring Therapeutic Intervention: decreased strength, decreased endurance, decreased balance, increased pain  Prognosis: good  Clinical Assessment: The pt presents with decreased BLE strength, activity tolerance and balance. She feels she has had difficulty completing IADLs and ambulation for the last two weeks. Pt feels better today and feels her breathing is improving. Pt is interested in [address] . Safety Interventions: patient left in chair, chair alarm in place, call light within reach, and nurse notified    Plan  Frequency: 1-2 x/per week  Current Treatment Recommendations: strengthening, balance training, functional mobility training, transfer training, gait training, stair training, endurance training, neuromuscular re-education, and safety education    Goals  Patient Goals: return home  Short Term Goals:  Time Frame:  To be met prior to DC  Patient will complete bed mobility at [facility]   Patient will complete transfers at [facility]   Patient will ambulate 75 ft with use of LRAD at [facility]  Patient will ascend/descend 12 stairs with (R) ascending handrail at stand by assistance    Therapy Session Time      Individual Group Co-treatment   Time In     0951   Time Out     1033   Minutes     42     Timed Code Treatment Minutes:  27 Minutes  Total Treatment Minutes:  42       Electronically Signed By: Sharmaine Peña, PT DPT [id number]

## 2022-08-11 NOTE — PROGRESS NOTES
[facility] Department   Phone: [phone]    Occupational Therapy    [x] Initial Evaluation            [] Daily Treatment Note         [x] Discharge Summary      Patient: Simi Campos   : 1945   MRN: [id number]   Date of Service:  2022    Admitting Diagnosis:  CHF (congestive heart failure) ([facility]  Current Admission Summary: Simi Campos is a 78 y.o. female  who presents to the ED complaining of shortness of breath, really over the past 10 days or so but particularly bad over the past 2 days. She has not had much coughing. Denies any leg swelling. She is worried about heart failure given that she lifted up and has orthopnea. She also has not had a fever. She went to urgent care and was told she was hypoxic and placed on nasal cannula oxygen for several hours and then sent here. She had a rapid COVID test there that was negative. She is not having chest pain or belly pain currently. History of diabetes. 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 Past Medical History:  has a past medical history of Diabetes mellitus ([facility] and Hypothyroid. Past Surgical History:  has no past surgical history on file. Discharge Recommendations: Anuj Gooden scored a 24/24 on the AM-PAC ADL Inpatient form. At this time, no further OT is recommended upon discharge due to patient at independent level. Recommend patient returns to prior setting with prior services. DME Required For Discharge: [facility] per PT recommendation    Precautions/Restrictions: medium fall risk, Isolation precautions, . Comment: COVID rule out , diet: Vegetarian    Pre-Admission Information   Lives With: spouse, son (son is 47 yo)    Type of Home: house  Home Layout: two level, 1/2 bath on main level, 13 stairs to 2nd level with ? HR, . Comment: bathroom with shower on second floor  Home Access:  2 step to enter without rails   Bathroom Layout: walk in shower, .   Comment: built in shower bench  Toilet Height: standard height  Bathroom Equipment: shower chair, . Comment: pt has a shower chair for   Home Equipment: rolling walker, . Comment: pt's  has a walker, pt's  owns a [facility] but he uses the [facility]  Transfer Assistance: Independent without use of device  Ambulation Assistance:Independent without use of device, . Comment: pt c/o difficulties with walking in the previous two weeks  ADL Assistance: independent with all ADL's  IADL Assistance: requires assistance with cleaning, requires assistance with shopping Comment: Pt reports that she used to check her blood glucose 3 times a day, but she hasn't been checking it lately because she has not been feeling well among other reasons. Pt reported a home health aide to assistance with cleaning. Active :        [x] Yes  [] No comment: recently received assistance with driving d/t fear of walking in a parking lot  Hand Dominance: [] Left  [x] Right  Current Employment: retired  Recent Falls: Pt denies recent falls  Comment: Pt's son able to provided 24/7 SUP, pt stated his son works from home, pt reports his son does not help often. Pt stated her  required assistance a few months ago but currently does not require assistance for ADLs. Examination   Vision:   Vision Corrective Device: wears glasses for distance  Hearing:   Hard of hearing  Pt unable to hear out of her left ear, able to hear out of right ear  Observation:   General Observation:  Pt seated on EOB talking on the phone  Posture:   WFL  Sensation:   accurately detects gross touch to all extremities and reports numbness and tingling in (B) LE  Pt reports her feet \"froze\"  ROM:   (B) UE ROM WFL  Strength:   (B) UE gross strength WFL  C/o L shoulder pain, pt reports she has had no surgical intervention    Decision Making: low complexity  Clinical Presentation: stable      Subjective  General: Pt pleasant and cooperative.  Pt agreeable to therapy evaluations. Pain: Patient does not rate upon questioning, pain c/o ache in calves while standing but not sitting  Pain Interventions: repositioned   Vitals: SPO2 >97% throughout session        Activities of Daily Living  Basic Activities of Daily Living  Lower Extremity Dressing: SUP  Dressing Comments: pt donned/doffed brief while in stance with SUP d/t c/o pain in legs, pt is independent in room   Toileting: IND    Toileting Comments: pt had a bowel movement on commode    Instrumental Activities of Daily Living  No IADL completed on this date. Comment: required assistance for turning on Wifi on iPhone    Functional Mobility  Bed Mobility  Bed mobility not completed on this date. Transfers  Sit to stand transfer:Independent  Stand to sit transfer: Independent  Toilet transfer: supervision  Comments:pt is independent in room    Functional Mobility:  Sitting Balance: Independent. Standing Balance: Independent. Functional Mobility: .  stand by assistance  Functional Mobility Device Use: no device  Functional Mobility Comment: in hospital room, 20ft + 15ft   Comments:pt is independent in room    Functional Outcomes  AM-PAC Inpatient Daily Activity Raw Score: 24    Cognition  WFL  Comment- anxious at times  Orientation:    A&O x 4  Command Following:   [facility]- may require repetition d/t hearing     Education  Barriers To Learning: hearing  Patient Education: Patient educated on plan of care, discharge recommendations  Learning Assessment:  Patient verbalized and demonstrates understanding    Assessment  Impairments Requiring Therapeutic Intervention: none - eval with same day discharge  Prognosis: good without need for therapy intervention  Clinical Assessment: Patient presenting at independent level for completion of required self care tasks for return to home. Pt has assistance from son and  as well as a home health aide to assist with cleaning. Eval with d/c at this time.   No therapy services indicated. Safety Interventions: patient left in chair and call light within reach    Plan  Frequency: Eval with same day discharge. No follow up required. Current Treatment Recommendations: Not applicable, evaluation completed with same day discharge. Goals  Patient eval with same day discharge. No goals set as patient is at baseline self care status.       Therapy Session Time     Individual Group Co-treatment   Time In    3034   Time Out    1031   Minutes    40        Timed Code Treatment Minutes:  Timed Code Treatment Minutes: 25 Minutes    Total Treatment Minutes:  40 minutes     Electronically Signed By: JIM Blake OTR/L, [id number]

## 2022-08-12 ENCOUNTER — APPOINTMENT (OUTPATIENT)
Dept: ULTRASOUND IMAGING | Age: 77
DRG: 247 | End: 2022-08-12
Payer: MEDICARE

## 2022-08-12 DIAGNOSIS — I25.10 CORONARY ARTERY DISEASE DUE TO LIPID RICH PLAQUE: Primary | ICD-10-CM

## 2022-08-12 DIAGNOSIS — I25.83 CORONARY ARTERY DISEASE DUE TO LIPID RICH PLAQUE: Primary | ICD-10-CM

## 2022-08-12 PROBLEM — I50.21 ACUTE SYSTOLIC CHF (CONGESTIVE HEART FAILURE) (HCC): Status: ACTIVE | Noted: 2022-08-12

## 2022-08-12 LAB
ALBUMIN SERPL-MCNC: 3.6 G/DL (ref 3.4–5)
ALP BLD-CCNC: 122 U/L (ref 40–129)
ALT SERPL-CCNC: 58 U/L (ref 10–40)
ANION GAP SERPL CALCULATED.3IONS-SCNC: 11 MMOL/L (ref 3–16)
AST SERPL-CCNC: 32 U/L (ref 15–37)
BASOPHILS ABSOLUTE: 0 K/UL (ref 0–0.2)
BASOPHILS RELATIVE PERCENT: 0.6 %
BILIRUB SERPL-MCNC: 0.3 MG/DL (ref 0–1)
BILIRUBIN DIRECT: <0.2 MG/DL (ref 0–0.3)
BILIRUBIN, INDIRECT: ABNORMAL MG/DL (ref 0–1)
BUN BLDV-MCNC: 36 MG/DL (ref 7–20)
CALCIUM SERPL-MCNC: 9.1 MG/DL (ref 8.3–10.6)
CHLORIDE BLD-SCNC: 99 MMOL/L (ref 99–110)
CO2: 24 MMOL/L (ref 21–32)
CREAT SERPL-MCNC: 1.4 MG/DL (ref 0.6–1.2)
EKG ATRIAL RATE: 79 BPM
EKG DIAGNOSIS: NORMAL
EKG P AXIS: 54 DEGREES
EKG P-R INTERVAL: 134 MS
EKG Q-T INTERVAL: 376 MS
EKG QRS DURATION: 88 MS
EKG QTC CALCULATION (BAZETT): 431 MS
EKG R AXIS: 28 DEGREES
EKG T AXIS: -84 DEGREES
EKG VENTRICULAR RATE: 79 BPM
EOSINOPHILS ABSOLUTE: 0.3 K/UL (ref 0–0.6)
EOSINOPHILS RELATIVE PERCENT: 3.5 %
ESTIMATED AVERAGE GLUCOSE: 243.2 MG/DL
GFR AFRICAN AMERICAN: 44
GFR NON-AFRICAN AMERICAN: 37
GLUCOSE BLD-MCNC: 122 MG/DL (ref 70–99)
GLUCOSE BLD-MCNC: 165 MG/DL (ref 70–99)
GLUCOSE BLD-MCNC: 229 MG/DL (ref 70–99)
GLUCOSE BLD-MCNC: 340 MG/DL (ref 70–99)
HBA1C MFR BLD: 10.1 %
HCT VFR BLD CALC: 32.7 % (ref 36–48)
HEMOGLOBIN: 11.4 G/DL (ref 12–16)
LYMPHOCYTES ABSOLUTE: 1.7 K/UL (ref 1–5.1)
LYMPHOCYTES RELATIVE PERCENT: 21.5 %
MAGNESIUM: 1.9 MG/DL (ref 1.8–2.4)
MCH RBC QN AUTO: 31.4 PG (ref 26–34)
MCHC RBC AUTO-ENTMCNC: 34.9 G/DL (ref 31–36)
MCV RBC AUTO: 89.9 FL (ref 80–100)
MONOCYTES ABSOLUTE: 0.7 K/UL (ref 0–1.3)
MONOCYTES RELATIVE PERCENT: 9 %
NEUTROPHILS ABSOLUTE: 5.3 K/UL (ref 1.7–7.7)
NEUTROPHILS RELATIVE PERCENT: 65.4 %
PDW BLD-RTO: 12.9 % (ref 12.4–15.4)
PERFORMED ON: ABNORMAL
PLATELET # BLD: 289 K/UL (ref 135–450)
PMV BLD AUTO: 8.3 FL (ref 5–10.5)
POC ACT LR: 197 SEC
POC ACT LR: 280 SEC
POTASSIUM SERPL-SCNC: 4.1 MMOL/L (ref 3.5–5.1)
PRO-BNP: 5434 PG/ML (ref 0–449)
RBC # BLD: 3.64 M/UL (ref 4–5.2)
SODIUM BLD-SCNC: 134 MMOL/L (ref 136–145)
TOTAL PROTEIN: 6.6 G/DL (ref 6.4–8.2)
WBC # BLD: 8.1 K/UL (ref 4–11)

## 2022-08-12 PROCEDURE — 2580000003 HC RX 258: Performed by: INTERNAL MEDICINE

## 2022-08-12 PROCEDURE — 6370000000 HC RX 637 (ALT 250 FOR IP): Performed by: INTERNAL MEDICINE

## 2022-08-12 PROCEDURE — 6360000002 HC RX W HCPCS

## 2022-08-12 PROCEDURE — B2111ZZ FLUOROSCOPY OF MULTIPLE CORONARY ARTERIES USING LOW OSMOLAR CONTRAST: ICD-10-PCS | Performed by: INTERNAL MEDICINE

## 2022-08-12 PROCEDURE — 99024 POSTOP FOLLOW-UP VISIT: CPT | Performed by: INTERNAL MEDICINE

## 2022-08-12 PROCEDURE — 92928 PRQ TCAT PLMT NTRAC ST 1 LES: CPT | Performed by: INTERNAL MEDICINE

## 2022-08-12 PROCEDURE — 93010 ELECTROCARDIOGRAM REPORT: CPT | Performed by: INTERNAL MEDICINE

## 2022-08-12 PROCEDURE — 93005 ELECTROCARDIOGRAM TRACING: CPT | Performed by: INTERNAL MEDICINE

## 2022-08-12 PROCEDURE — 6360000004 HC RX CONTRAST MEDICATION: Performed by: INTERNAL MEDICINE

## 2022-08-12 PROCEDURE — 99152 MOD SED SAME PHYS/QHP 5/>YRS: CPT

## 2022-08-12 PROCEDURE — 2709999900 HC NON-CHARGEABLE SUPPLY

## 2022-08-12 PROCEDURE — 6360000002 HC RX W HCPCS: Performed by: INTERNAL MEDICINE

## 2022-08-12 PROCEDURE — 99152 MOD SED SAME PHYS/QHP 5/>YRS: CPT | Performed by: INTERNAL MEDICINE

## 2022-08-12 PROCEDURE — 83735 ASSAY OF MAGNESIUM: CPT

## 2022-08-12 PROCEDURE — 99233 SBSQ HOSP IP/OBS HIGH 50: CPT | Performed by: NURSE PRACTITIONER

## 2022-08-12 PROCEDURE — 2500000003 HC RX 250 WO HCPCS

## 2022-08-12 PROCEDURE — 80048 BASIC METABOLIC PNL TOTAL CA: CPT

## 2022-08-12 PROCEDURE — C1894 INTRO/SHEATH, NON-LASER: HCPCS

## 2022-08-12 PROCEDURE — 1200000000 HC SEMI PRIVATE

## 2022-08-12 PROCEDURE — 36415 COLL VENOUS BLD VENIPUNCTURE: CPT

## 2022-08-12 PROCEDURE — C1769 GUIDE WIRE: HCPCS

## 2022-08-12 PROCEDURE — 85347 COAGULATION TIME ACTIVATED: CPT

## 2022-08-12 PROCEDURE — C9600 PERC DRUG-EL COR STENT SING: HCPCS

## 2022-08-12 PROCEDURE — C1874 STENT, COATED/COV W/DEL SYS: HCPCS

## 2022-08-12 PROCEDURE — 85025 COMPLETE CBC W/AUTO DIFF WBC: CPT

## 2022-08-12 PROCEDURE — 027034Z DILATION OF CORONARY ARTERY, ONE ARTERY WITH DRUG-ELUTING INTRALUMINAL DEVICE, PERCUTANEOUS APPROACH: ICD-10-PCS | Performed by: INTERNAL MEDICINE

## 2022-08-12 PROCEDURE — 80076 HEPATIC FUNCTION PANEL: CPT

## 2022-08-12 PROCEDURE — 99153 MOD SED SAME PHYS/QHP EA: CPT

## 2022-08-12 PROCEDURE — 86038 ANTINUCLEAR ANTIBODIES: CPT

## 2022-08-12 PROCEDURE — 76705 ECHO EXAM OF ABDOMEN: CPT

## 2022-08-12 PROCEDURE — C1887 CATHETER, GUIDING: HCPCS

## 2022-08-12 PROCEDURE — 93458 L HRT ARTERY/VENTRICLE ANGIO: CPT

## 2022-08-12 PROCEDURE — 4A023N7 MEASUREMENT OF CARDIAC SAMPLING AND PRESSURE, LEFT HEART, PERCUTANEOUS APPROACH: ICD-10-PCS | Performed by: INTERNAL MEDICINE

## 2022-08-12 PROCEDURE — 83880 ASSAY OF NATRIURETIC PEPTIDE: CPT

## 2022-08-12 PROCEDURE — 82728 ASSAY OF FERRITIN: CPT

## 2022-08-12 PROCEDURE — 2580000003 HC RX 258

## 2022-08-12 PROCEDURE — 93458 L HRT ARTERY/VENTRICLE ANGIO: CPT | Performed by: INTERNAL MEDICINE

## 2022-08-12 PROCEDURE — 6370000000 HC RX 637 (ALT 250 FOR IP)

## 2022-08-12 RX ORDER — CARVEDILOL 3.12 MG/1
3.12 TABLET ORAL 2 TIMES DAILY WITH MEALS
Status: DISCONTINUED | OUTPATIENT
Start: 2022-08-12 | End: 2022-08-12

## 2022-08-12 RX ORDER — LOSARTAN POTASSIUM 25 MG/1
25 TABLET ORAL DAILY
Status: DISCONTINUED | OUTPATIENT
Start: 2022-08-12 | End: 2022-08-14

## 2022-08-12 RX ORDER — ASPIRIN 81 MG/1
81 TABLET ORAL DAILY
Status: DISCONTINUED | OUTPATIENT
Start: 2022-08-13 | End: 2022-08-14 | Stop reason: HOSPADM

## 2022-08-12 RX ORDER — SODIUM CHLORIDE 9 MG/ML
INJECTION, SOLUTION INTRAVENOUS CONTINUOUS
Status: DISCONTINUED | OUTPATIENT
Start: 2022-08-12 | End: 2022-08-14 | Stop reason: HOSPADM

## 2022-08-12 RX ORDER — CLOPIDOGREL BISULFATE 75 MG/1
75 TABLET ORAL DAILY
Status: DISCONTINUED | OUTPATIENT
Start: 2022-08-13 | End: 2022-08-14 | Stop reason: HOSPADM

## 2022-08-12 RX ORDER — SODIUM CHLORIDE 9 MG/ML
INJECTION, SOLUTION INTRAVENOUS PRN
Status: DISCONTINUED | OUTPATIENT
Start: 2022-08-12 | End: 2022-08-14 | Stop reason: HOSPADM

## 2022-08-12 RX ORDER — CARVEDILOL 3.12 MG/1
3.12 TABLET ORAL 2 TIMES DAILY WITH MEALS
Status: DISCONTINUED | OUTPATIENT
Start: 2022-08-12 | End: 2022-08-14

## 2022-08-12 RX ORDER — ROSUVASTATIN CALCIUM 10 MG/1
20 TABLET, COATED ORAL NIGHTLY
Status: DISCONTINUED | OUTPATIENT
Start: 2022-08-12 | End: 2022-08-14 | Stop reason: HOSPADM

## 2022-08-12 RX ORDER — SODIUM CHLORIDE 0.9 % (FLUSH) 0.9 %
5-40 SYRINGE (ML) INJECTION EVERY 12 HOURS SCHEDULED
Status: DISCONTINUED | OUTPATIENT
Start: 2022-08-12 | End: 2022-08-14 | Stop reason: HOSPADM

## 2022-08-12 RX ORDER — ACETAMINOPHEN 325 MG/1
650 TABLET ORAL EVERY 4 HOURS PRN
Status: DISCONTINUED | OUTPATIENT
Start: 2022-08-12 | End: 2022-08-14 | Stop reason: HOSPADM

## 2022-08-12 RX ORDER — SODIUM CHLORIDE 0.9 % (FLUSH) 0.9 %
5-40 SYRINGE (ML) INJECTION PRN
Status: DISCONTINUED | OUTPATIENT
Start: 2022-08-12 | End: 2022-08-14 | Stop reason: HOSPADM

## 2022-08-12 RX ADMIN — ASPIRIN 81 MG: 81 TABLET, CHEWABLE ORAL at 08:51

## 2022-08-12 RX ADMIN — MULTIPLE VITAMINS W/ MINERALS TAB 1 TABLET: TAB at 08:52

## 2022-08-12 RX ADMIN — CARVEDILOL 3.12 MG: 3.12 TABLET, FILM COATED ORAL at 18:17

## 2022-08-12 RX ADMIN — ALOGLIPTIN 6.25 MG: 6.25 TABLET, FILM COATED ORAL at 08:51

## 2022-08-12 RX ADMIN — ENOXAPARIN SODIUM 40 MG: 100 INJECTION SUBCUTANEOUS at 08:51

## 2022-08-12 RX ADMIN — INSULIN LISPRO 4 UNITS: 100 INJECTION, SOLUTION INTRAVENOUS; SUBCUTANEOUS at 20:13

## 2022-08-12 RX ADMIN — IOPAMIDOL 20 ML: 755 INJECTION, SOLUTION INTRAVENOUS at 11:06

## 2022-08-12 RX ADMIN — INSULIN LISPRO 2 UNITS: 100 INJECTION, SOLUTION INTRAVENOUS; SUBCUTANEOUS at 18:13

## 2022-08-12 RX ADMIN — METFORMIN HYDROCHLORIDE 500 MG: 500 TABLET ORAL at 08:52

## 2022-08-12 RX ADMIN — Medication 10 ML: at 08:53

## 2022-08-12 RX ADMIN — EMPAGLIFLOZIN 25 MG: 10 TABLET, FILM COATED ORAL at 08:52

## 2022-08-12 RX ADMIN — FUROSEMIDE 40 MG: 40 TABLET ORAL at 08:52

## 2022-08-12 RX ADMIN — SODIUM CHLORIDE: 9 INJECTION, SOLUTION INTRAVENOUS at 14:30

## 2022-08-12 RX ADMIN — ROSUVASTATIN 20 MG: 10 TABLET, FILM COATED ORAL at 20:13

## 2022-08-12 ASSESSMENT — PAIN SCALES - GENERAL
PAINLEVEL_OUTOF10: 0

## 2022-08-12 NOTE — PROGRESS NOTES
Progress Note - Dr. Andrew Tirado - Internal Medicine  PCP: Fernandez Mai, [address] / [place] [id number] [facility] [address] Day: 2  Code Status: Full Code  Current Diet: Diet NPO Exceptions are: Sips of Water with Meds        CC: follow up on medical issues    Subjective:   Roel Cabrera is a 78 y.o. female. Pt seen and examined  Chart reviewed since last visit, labs and imaging below      Doing better  Now on RA  -1500 out total; iv lasix had to be held  Awaiting interventional cards eval  BP was soft, so holding a number of CHF meds  CHF recommends poss cath        Review of Systems: (1 system for EPF, 2-9 for detailed, 10+ for comprehensive)  Constitutional: Negative for chills and fever. HENT: Negative for dental problem, nosebleeds and rhinorrhea. Eyes: Negative for photophobia and visual disturbance. Respiratory: Negative for cough, chest tightness and positive for some shortness of breath. Cardiovascular: Negative for chest pain and leg swelling. Gastrointestinal: Negative for diarrhea, nausea and vomiting. Endocrine: Negative for polydipsia and polyphagia. Genitourinary: Negative for frequency, hematuria and urgency. Musculoskeletal: Negative for back pain and myalgias. Skin: Negative for rash. Allergic/Immunologic: Negative for food allergies. Neurological: Negative for dizziness, seizures, syncope and facial asymmetry. Hematological: Negative for adenopathy. Psychiatric/Behavioral: Negative for dysphoric mood. The patient is not nervous/anxious. I have reviewed the patient's medical and social history in detail and updated the computerized patient record. To recap: She  has a past medical history of Diabetes mellitus ([facility] and Hypothyroid. . She  has no past surgical history on file. . She  reports that she does not drink alcohol. .        Active Hospital Problems    Diagnosis Date Noted    Acute pulmonary edema ([facility] [J81.0] 08/11/2022     Priority: Medium    Shortness of breath [R06.02] 08/11/2022     Priority: Medium    Acute systolic (congestive) heart failure ([facility] [I50.21] 08/11/2022     Priority: Medium    CHF (congestive heart failure) ([facility] [I50.9] 08/10/2022     Priority: Medium    DM2 (diabetes mellitus, type 2) ([facility] [E11.9] 08/10/2022     Priority: Medium    Elevated liver function tests [R79.89] 08/10/2022     Priority: Medium    Anemia [D64.9] 08/10/2022     Priority: Medium    Bilateral pleural effusion [J90] 08/10/2022     Priority: Medium    Acute on chronic heart failure, unspecified heart failure type ([facility] [I50.9] 08/10/2022     Priority: Medium    Acute respiratory failure with hypoxia (HCC) [J96.01] 08/10/2022     Priority: Medium    Suspected COVID-19 virus infection [Z20.822] 08/10/2022     Priority: Medium       Current Facility-Administered Medications: alogliptin (NESINA) tablet 6.25 mg, 6.25 mg, Oral, Daily  metFORMIN (GLUCOPHAGE) tablet 500 mg, 500 mg, Oral, BID WC  empagliflozin (JARDIANCE) tablet 25 mg, 25 mg, Oral, Daily  glucose-vitamin C chewable tablet 4 tablet, 4 tablet, Oral, PRN  dextrose bolus 10% 125 mL, 125 mL, IntraVENous, PRN **OR** dextrose bolus 10% 250 mL, 250 mL, IntraVENous, PRN  glucagon (rDNA) injection 1 mg, 1 mg, SubCUTAneous, PRN  dextrose 10 % infusion, , IntraVENous, Continuous PRN  insulin lispro (HUMALOG) injection vial 0-8 Units, 0-8 Units, SubCUTAneous, TID WC  insulin lispro (HUMALOG) injection vial 0-4 Units, 0-4 Units, SubCUTAneous, Nightly  aspirin chewable tablet 81 mg, 81 mg, Oral, Daily  furosemide (LASIX) tablet 40 mg, 40 mg, Oral, Daily  therapeutic multivitamin-minerals 1 tablet, 1 tablet, Oral, Daily  perflutren lipid microspheres (DEFINITY) injection 1.65 mg, 1.5 mL, IntraVENous, ONCE PRN  sodium chloride flush 0.9 % injection 5-40 mL, 5-40 mL, IntraVENous, 2 times per day  sodium chloride flush 0.9 % injection 10 mL, 10 mL, IntraVENous, PRN  0.9 % sodium chloride infusion, , IntraVENous, PRN  ondansetron (ZOFRAN-ODT) disintegrating tablet 4 mg, 4 mg, Oral, Q8H PRN **OR** ondansetron (ZOFRAN) injection 4 mg, 4 mg, IntraVENous, Q6H PRN  magnesium hydroxide (MILK OF MAGNESIA) 400 MG/5ML suspension 30 mL, 30 mL, Oral, Daily PRN  acetaminophen (TYLENOL) tablet 650 mg, 650 mg, Oral, Q6H PRN **OR** acetaminophen (TYLENOL) suppository 650 mg, 650 mg, Rectal, Q6H PRN  enoxaparin (LOVENOX) injection 40 mg, 40 mg, SubCUTAneous, Daily  hydrALAZINE (APRESOLINE) injection 10 mg, 10 mg, IntraVENous, Q6H PRN  0.9 % sodium chloride bolus, 500 mL, IntraVENous, PRN  potassium chloride (KLOR-CON M) extended release tablet 40 mEq, 40 mEq, Oral, PRN **OR** potassium bicarb-citric acid (EFFER-K) effervescent tablet 40 mEq, 40 mEq, Oral, PRN **OR** potassium chloride 10 mEq/100 mL IVPB (Peripheral Line), 10 mEq, IntraVENous, PRN         Objective:  BP (!) 98/59   Pulse 69   Temp 97.5 °F (36.4 °C) (Oral)   Resp 14   Ht 4' 10\" (1.473 m)   Wt 76 lb 1.6 oz (34.5 kg)   SpO2 99%   BMI 15.90 kg/m²      Patient Vitals for the past 24 hrs:   BP Temp Temp src Pulse Resp SpO2 Weight   08/12/22 0711 (!) 98/59 97.5 °F (36.4 °C) Oral 69 14 99 % --   08/12/22 0700 (!) 98/59 97.5 °F (36.4 °C) Oral 69 16 99 % --   08/12/22 0505 -- -- -- -- -- -- 76 lb 1.6 oz (34.5 kg)   08/12/22 0333 -- -- -- 70 -- -- --   08/12/22 0320 112/67 97.7 °F (36.5 °C) Oral 78 14 98 % --   08/11/22 2321 109/66 97.7 °F (36.5 °C) Oral 74 16 95 % --   08/11/22 2103 -- -- -- 81 -- -- --   08/11/22 1940 107/62 97.3 °F (36.3 °C) Oral 93 14 100 % --   08/11/22 1815 (!) 82/52 97.4 °F (36.3 °C) Oral 84 18 100 % --   08/11/22 1445 (!) 86/58 -- -- -- -- -- --   08/11/22 1230 (!) 90/56 97.1 °F (36.2 °C) Oral 82 18 98 % --   08/11/22 0857 -- -- -- 74 -- -- --       Patient Vitals for the past 96 hrs (Last 3 readings):   Weight   08/12/22 0505 76 lb 1.6 oz (34.5 kg)   08/11/22 0450 81 lb 12.8 oz (37.1 kg)   08/10/22 2339 84 lb (38.1 kg) Intake/Output Summary (Last 24 hours) at 8/12/2022 8894  Last data filed at 8/12/2022 0320  Gross per 24 hour   Intake 10 ml   Output 300 ml   Net -290 ml           Physical Exam: (2-7 system for EPF/Detailed, ?8 for Comprehensive)  BP (!) 98/59   Pulse 69   Temp 97.5 °F (36.4 °C) (Oral)   Resp 14   Ht 4' 10\" (1.473 m)   Wt 76 lb 1.6 oz (34.5 kg)   SpO2 99%   BMI 15.90 kg/m²   Constitutional: vitals as above: alert, appears stated age and cooperative    Psychiatric: normal insight and judgment, oriented to person, place, time, and general circumstances    Head: Normocephalic, without obvious abnormality, atraumatic    Eyes:lids and lashes normal, conjunctivae and sclerae normal and pupils equal, round, reactive to light and accomodation    EMNT: external ears normal, nares midline    Neck: no carotid bruit, supple, symmetrical, trachea midline and thyroid not enlarged, symmetric, no tenderness/mass/nodules     Respiratory: crackles bilaterally with normal respiratory effort    Cardiovascular: normal rate, regular rhythm, normal S1 and S2 and no murmurs    Gastrointestinal: soft, non-tender, non-distended, normal bowel sounds, no masses or organomegaly    Extremities: no clubbing, trace  edema    Skin:No rashes or nodules noted.     Neurologic:negative         Labs:  Lab Results   Component Value Date    WBC 8.1 08/12/2022    HGB 11.4 (L) 08/12/2022    HCT 32.7 (L) 08/12/2022     08/12/2022    CHOL 195 08/11/2022    TRIG 115 08/11/2022    HDL 63 (H) 08/11/2022    ALT 58 (H) 08/12/2022    AST 32 08/12/2022     (L) 08/12/2022    K 4.1 08/12/2022    CL 99 08/12/2022    CREATININE 1.4 (H) 08/12/2022    BUN 36 (H) 08/12/2022    CO2 24 08/12/2022    TSH 2.93 08/11/2022    LABA1C 10.0 08/11/2022     Lab Results   Component Value Date    TROPONINI <0.01 08/10/2022       Recent Imaging Results are Reviewed:  XR CHEST PORTABLE    Result Date: 8/10/2022  EXAMINATION: ONE XRAY VIEW OF THE CHEST 8/10/2022 6:26 pm COMPARISON: None. HISTORY: ORDERING SYSTEM PROVIDED HISTORY: SOB TECHNOLOGIST PROVIDED HISTORY: Reason for exam:->SOB Reason for Exam: SHOB FINDINGS: The cardiac silhouette is enlarged. Aortic vascular calcification. There is vascular congestion with perihilar edema along with small bilateral effusions most consistent with CHF. Mild dependent atelectasis is suggested bilaterally. CHF with cardiomegaly, perihilar edema and small bilateral effusions. CT CHEST PULMONARY EMBOLISM W CONTRAST    Result Date: 8/10/2022  EXAMINATION: CTA OF THE CHEST 8/10/2022 8:08 pm TECHNIQUE: CTA of the chest was performed after the administration of intravenous contrast.  Multiplanar reformatted images are provided for review. MIP images are provided for review. Automated exposure control, iterative reconstruction, and/or weight based adjustment of the mA/kV was utilized to reduce the radiation dose to as low as reasonably achievable. COMPARISON: None. HISTORY: ORDERING SYSTEM PROVIDED HISTORY: +SOB, +ddimer TECHNOLOGIST PROVIDED HISTORY: Reason for exam:->+SOB, +ddimer Decision Support Exception - unselect if not a suspected or confirmed emergency medical condition->Emergency Medical Condition (MA) Reason for Exam: +SOB, +ddimer FINDINGS: Pulmonary Arteries: Pulmonary arteries are adequately opacified for evaluation. No evidence of intraluminal filling defect to suggest pulmonary embolism. Main pulmonary artery is normal in caliber. Mediastinum: No evidence of mediastinal lymphadenopathy. The heart and pericardium demonstrate no acute abnormality. There is no acute abnormality of the thoracic aorta. Lungs/pleura: There is septal thickening, likely related to mild pulmonary vascular congestion. There are moderate bilateral pleural effusions. There is mild dependent and discoid atelectasis at the bilateral lung bases.   There is bronchial wall thickening, likely related to small airway disease or of isolation      (Please note that portions of this note were completed with a voice recognition program.  Efforts were made to edit the dictations but occasionally words are mis-transcribed.)        Jennifer Denton MD  8/12/2022

## 2022-08-12 NOTE — PLAN OF CARE
Problem: Hematologic - Adult  Goal: Maintains hematologic stability     Problem: Metabolic/Fluid and Electrolytes - Adult  Goal: Glucose maintained within prescribed range  Recent Flowsheet Documentation  Glucose maintained within prescribed range:   Monitor blood glucose as ordered   Assess for signs and symptoms of hyperglycemia and hypoglycemia     Vitals:    08/11/22 1940   BP: 107/62   Pulse: 93   Resp: 14   Temp: 97.3 °F (36.3 °C)   SpO2: 100%     Pt up in chair, eating dinner, and denies pain. VS obtained. See all flowsheets. PCA at bedside to check accucheck, but pt just finished dinner tray. PCA will come check BG at 2300; RN adjusted insulin PRN on MAR for 2300 dose time. Pt denies any needs at this time and expresses gratitude for her care. Will continue to monitor.

## 2022-08-12 NOTE — OP NOTE
Patient:  Demetrius Dillon   :   1945    Procedural Summary  ~Consent:   Obtained written and verbal consent      Risks/benefits explained in detail  ~Procedure:    Left Heart Catheterization  ~Medications:    Procedural sedation with minimal conscious sedation  ~Complications:   None  ~Blood Loss:    <10cc  ~Specimens:    None obtained  ~Pre-sedation re-evaluation: Performed immediately prior to procedure. Medication and Procedural Reconciliation:  An independent trained observer pushed medications at my direction. We monitored the patient's level of consciousness and vital signs/physiologic status throughout the procedure duration (see start and stop times below). Sedation: 1 mg Versed, 25 mcg Fentanyl  Sedation start:   Sedation stop:     Cardiac Cath PCI:  Anatomy:   LM-nml   LAD-mid 95%  Cx-nml  OM- nml  RCA-dom nml  RPDA- nml  LVEDP- 1    Intervention  ~Successful PCI to LAD with 2.25x18 ELIAS. Excellent Result. 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 Post cath IVF. Bedrest.   2. Recommend beta blocker, ace/arb, high potency statin, aspirin and plavix for 6-12 months. 3. Referral to outpatient cardiac rehab phase II will be deferred until patient follow-up in office and then determine patient safety and appropriateness to proceed  4. Patient has been advised on the importance of regular exercise of at least 20-30 minutes daily. 5. Patient counseled about and offered assistance for smoking cessation   6.  Follow up in 2 weeks with cardiology            Anamaria Kidd MD, MD 2022 10:47 AM

## 2022-08-12 NOTE — PLAN OF CARE
Problem: Hematologic - Adult  Goal: Maintains hematologic stability  Outcome: Progressing     Vitals:    08/12/22 0320   BP: 112/67   Pulse: 78   Resp: 14   Temp: 97.7 °F (36.5 °C)   SpO2: 98%     VSS overnight. See all flowsheets. Will continue to monitor.

## 2022-08-12 NOTE — PRE SEDATION
Brief Pre-Op Note/Sedation Assessment      Simi Campos  1945  [id number]  9:49 AM    Planned Procedure: Cardiac Catheterization Procedure  Post Procedure Plan: Return to same level of care  Consent: I have discussed with the patient and/or the patient representative the indication, alternatives, and the possible risks and/or complications of the planned procedure and the anesthesia methods. The patient and/or patient representative appear to understand and agree to proceed. Chief Complaint:   Dyspnea on Exertion      Indications for Cath Procedure:  Presentation:  Suspected CAD, Cardiomyopathy, and LV Dysfunction  2. Anginal Classification within 2 weeks:  CCS I - Angina only during strenuous or prolonged physical activity  3. Angina Symptoms Assessment:  Atypical Chest Pain  4. Heart Failure Class within last 2 weeks:  Yes:  Heart Failure Type: Systolic Severity:  Class IV - Symptoms of HF at rest  5. Cardiovascular Instability:  Yes:  Decompensating heart failure and Acute CHF symptoms    Prior Ischemic Workup/Eval:  Pre-Procedural Medications: Yes: ACE/ARB/ARNI, Aspirin, Beta Blockers, and STATIN  2. Stress Test Completed? No    Does Patient need surgery? Cath Valve Surgery:  No    Pre-Procedure Medical History:  Vital Signs:  BP (!) 98/59   Pulse 69   Temp 97.5 °F (36.4 °C) (Oral)   Resp 14   Ht 4' 10\" (1.473 m)   Wt 76 lb 1.6 oz (34.5 kg)   SpO2 99%   BMI 15.90 kg/m²     Allergies:     Allergies   Allergen Reactions    Codeine      nausea  Other reaction(s): Vomiting     Medications:    Current Facility-Administered Medications   Medication Dose Route Frequency Provider Last Rate Last Admin    alogliptin (NESINA) tablet 6.25 mg  6.25 mg Oral Daily Arron Arias MD   6.25 mg at 08/12/22 0851    metFORMIN (GLUCOPHAGE) tablet 500 mg  500 mg Oral BID WC Arron Arias MD   500 mg at 08/12/22 6436    empagliflozin (JARDIANCE) tablet 25 mg  25 mg Oral Daily Arron Arias MD   52 mg at 08/12/22 3360    glucose-vitamin C chewable tablet 4 tablet  4 tablet Oral PRN Nathan Connolly MD        dextrose bolus 10% 125 mL  125 mL IntraVENous PRN Nathan Connolly MD        Or    dextrose bolus 10% 250 mL  250 mL IntraVENous PRN Nathan Connolly MD        glucagon (rDNA) injection 1 mg  1 mg SubCUTAneous PRN Nathan Connolly MD        dextrose 10 % infusion   IntraVENous Continuous PRN Nathan Connolly MD        insulin lispro (HUMALOG) injection vial 0-8 Units  0-8 Units SubCUTAneous TID WC Nathan Connolly MD   8 Units at 08/11/22 1243    insulin lispro (HUMALOG) injection vial 0-4 Units  0-4 Units SubCUTAneous Nightly Nathan Connolly MD   4 Units at 08/11/22 2359    aspirin chewable tablet 81 mg  81 mg Oral Daily Graice Torres MD   81 mg at 08/12/22 0851    furosemide (LASIX) tablet 40 mg  40 mg Oral Daily Gracie Torres MD   40 mg at 08/12/22 3264    therapeutic multivitamin-minerals 1 tablet  1 tablet Oral Daily Nathan Connolly MD   1 tablet at 08/12/22 7288    perflutren lipid microspheres (DEFINITY) injection 1.65 mg  1.5 mL IntraVENous ONCE PRN Nathan Connolly MD        sodium chloride flush 0.9 % injection 5-40 mL  5-40 mL IntraVENous 2 times per day Nathan Connolly MD   10 mL at 08/12/22 0853    sodium chloride flush 0.9 % injection 10 mL  10 mL IntraVENous PRN Nathan Connolly MD   10 mL at 08/11/22 0021    0.9 % sodium chloride infusion   IntraVENous PRSRINIVAS Connolly MD        ondansetron (ZOFRAN-ODT) disintegrating tablet 4 mg  4 mg Oral Q8H PRN Nathan Connolly MD        Or    ondansetron [facility]) injection 4 mg  4 mg IntraVENous Q6H PRN Nathan Connolly MD        magnesium hydroxide (MILK OF MAGNESIA) 400 MG/5ML suspension 30 mL  30 mL Oral Daily PRN Nathan Connolly MD        acetaminophen (TYLENOL) tablet 650 mg  650 mg Oral Q6H PRN Nathan Connolly MD   650 mg at 08/11/22 2210    Or    acetaminophen (TYLENOL) suppository 650 mg  650 mg Rectal Q6H PRN Nathan Connolly MD enoxaparin (LOVENOX) injection 40 mg  40 mg SubCUTAneous Daily Misael Kaufman MD   40 mg at 08/12/22 0851    hydrALAZINE (APRESOLINE) injection 10 mg  10 mg IntraVENous Q6H PRN Misael Kaufman MD        0.9 % sodium chloride bolus  500 mL IntraVENous PRN Misael Kaufman MD        potassium chloride (KLOR-CON M) extended release tablet 40 mEq  40 mEq Oral PRN Misael Kaufman MD        Or    potassium bicarb-citric acid (EFFER-K) effervescent tablet 40 mEq  40 mEq Oral PRN Misael Kaufman MD        Or    potassium chloride 10 mEq/100 mL IVPB (Peripheral Line)  10 mEq IntraVENous PRN Misael Kaufman MD           Past Medical History:    Past Medical History:   Diagnosis Date    Diabetes mellitus [facility])     Hypothyroid        Surgical History:  History reviewed. No pertinent surgical history. Pre-Sedation:  Pre-Sedation Documentation and Exam:  I have assessed the patient and reviewed the H&P on the chart. Prior History of Anesthesia Complications:   none    Modified Mallampati:  II (soft palate, uvula, fauces visible)    ASA Classification:  Class 2 - A normal healthy patient with mild systemic disease    Favian Scale: Activity:  2 - Able to move 4 extremities voluntarily on command  Respiration:  2 - Able to breathe deeply and cough freely  Circulation:  2 - BP+/- 20mmHg of normal  Consciousness:  2 - Fully awake  Oxygen Saturation (color):  2 - Able to maintain oxygen saturation >92% on room air    Sedation/Anesthesia Plan:  Guard the patient's safety and welfare. Minimize physical discomfort and pain. Minimize negative psychological responses to treatment by providing sedation and analgesia and maximize the potential amnesia. Patient to meet pre-procedure discharge plan.     Medication Planned:  midazolam intravenously and fentanyl intravenously    Patient is an appropriate candidate for plan of sedation:   yes      Electronically signed by Dorcas Salas MD on 8/12/2022 at 9:49 AM

## 2022-08-12 NOTE — FLOWSHEET NOTE
Perfectserved MD last night re: pain management. Tylenol given at 2211; see MAR & all flowsheets. MD read message and responded to RN via PayTouch at [phone], \"Norco 5mg q6h prn please\"          Pt mostly asleep overnight & denies pain s/p Tylenol given at 2211. RN updated MD via [employer]Touch at 0501, \"FYI: pt is allergic to codeine & pt declines; no PRN order placed & pt received tylenol overnight. Tylenol is Q6hr PRN; would it be safe to change it to Q4hr PRN considering her liver functioning? \"

## 2022-08-13 LAB
ANION GAP SERPL CALCULATED.3IONS-SCNC: 13 MMOL/L (ref 3–16)
ANTI-NUCLEAR ANTIBODY (ANA): POSITIVE
BASOPHILS ABSOLUTE: 0.1 K/UL (ref 0–0.2)
BASOPHILS RELATIVE PERCENT: 0.8 %
BUN BLDV-MCNC: 35 MG/DL (ref 7–20)
CALCIUM SERPL-MCNC: 8.3 MG/DL (ref 8.3–10.6)
CHLORIDE BLD-SCNC: 98 MMOL/L (ref 99–110)
CO2: 21 MMOL/L (ref 21–32)
CREAT SERPL-MCNC: 1.3 MG/DL (ref 0.6–1.2)
EOSINOPHILS ABSOLUTE: 0.3 K/UL (ref 0–0.6)
EOSINOPHILS RELATIVE PERCENT: 3.8 %
FERRITIN: 127.9 NG/ML (ref 15–150)
GFR AFRICAN AMERICAN: 48
GFR NON-AFRICAN AMERICAN: 40
GLUCOSE BLD-MCNC: 120 MG/DL (ref 70–99)
GLUCOSE BLD-MCNC: 259 MG/DL (ref 70–99)
GLUCOSE BLD-MCNC: 267 MG/DL (ref 70–99)
GLUCOSE BLD-MCNC: 272 MG/DL (ref 70–99)
GLUCOSE BLD-MCNC: 323 MG/DL (ref 70–99)
GLUCOSE BLD-MCNC: 85 MG/DL (ref 70–99)
GLUCOSE BLD-MCNC: 99 MG/DL (ref 70–99)
HCT VFR BLD CALC: 34.3 % (ref 36–48)
HEMOGLOBIN: 11.4 G/DL (ref 12–16)
LYMPHOCYTES ABSOLUTE: 1.5 K/UL (ref 1–5.1)
LYMPHOCYTES RELATIVE PERCENT: 17.6 %
MAGNESIUM: 2.2 MG/DL (ref 1.8–2.4)
MCH RBC QN AUTO: 30.9 PG (ref 26–34)
MCHC RBC AUTO-ENTMCNC: 33.2 G/DL (ref 31–36)
MCV RBC AUTO: 93 FL (ref 80–100)
MONOCYTES ABSOLUTE: 0.6 K/UL (ref 0–1.3)
MONOCYTES RELATIVE PERCENT: 7.4 %
NEUTROPHILS ABSOLUTE: 5.9 K/UL (ref 1.7–7.7)
NEUTROPHILS RELATIVE PERCENT: 70.4 %
PDW BLD-RTO: 13.1 % (ref 12.4–15.4)
PERFORMED ON: ABNORMAL
PERFORMED ON: NORMAL
PERFORMED ON: NORMAL
PLATELET # BLD: 286 K/UL (ref 135–450)
PMV BLD AUTO: 8.1 FL (ref 5–10.5)
POTASSIUM SERPL-SCNC: 4.3 MMOL/L (ref 3.5–5.1)
PRO-BNP: 1807 PG/ML (ref 0–449)
RBC # BLD: 3.68 M/UL (ref 4–5.2)
SODIUM BLD-SCNC: 132 MMOL/L (ref 136–145)
WBC # BLD: 8.3 K/UL (ref 4–11)

## 2022-08-13 PROCEDURE — 83516 IMMUNOASSAY NONANTIBODY: CPT

## 2022-08-13 PROCEDURE — 1200000000 HC SEMI PRIVATE

## 2022-08-13 PROCEDURE — 6370000000 HC RX 637 (ALT 250 FOR IP): Performed by: INTERNAL MEDICINE

## 2022-08-13 PROCEDURE — 2580000003 HC RX 258: Performed by: INTERNAL MEDICINE

## 2022-08-13 PROCEDURE — 86015 ACTIN ANTIBODY EACH: CPT

## 2022-08-13 PROCEDURE — 99233 SBSQ HOSP IP/OBS HIGH 50: CPT | Performed by: NURSE PRACTITIONER

## 2022-08-13 PROCEDURE — 36415 COLL VENOUS BLD VENIPUNCTURE: CPT

## 2022-08-13 PROCEDURE — 80048 BASIC METABOLIC PNL TOTAL CA: CPT

## 2022-08-13 PROCEDURE — 83735 ASSAY OF MAGNESIUM: CPT

## 2022-08-13 PROCEDURE — 6360000002 HC RX W HCPCS: Performed by: INTERNAL MEDICINE

## 2022-08-13 PROCEDURE — 85025 COMPLETE CBC W/AUTO DIFF WBC: CPT

## 2022-08-13 PROCEDURE — 86039 ANTINUCLEAR ANTIBODIES (ANA): CPT

## 2022-08-13 PROCEDURE — 83880 ASSAY OF NATRIURETIC PEPTIDE: CPT

## 2022-08-13 RX ORDER — INSULIN LISPRO 100 [IU]/ML
0-4 INJECTION, SOLUTION INTRAVENOUS; SUBCUTANEOUS NIGHTLY
Status: DISCONTINUED | OUTPATIENT
Start: 2022-08-13 | End: 2022-08-14 | Stop reason: HOSPADM

## 2022-08-13 RX ORDER — INSULIN LISPRO 100 [IU]/ML
0-16 INJECTION, SOLUTION INTRAVENOUS; SUBCUTANEOUS
Status: DISCONTINUED | OUTPATIENT
Start: 2022-08-13 | End: 2022-08-14 | Stop reason: HOSPADM

## 2022-08-13 RX ADMIN — Medication 10 ML: at 08:41

## 2022-08-13 RX ADMIN — ASPIRIN 81 MG: 81 TABLET, COATED ORAL at 08:33

## 2022-08-13 RX ADMIN — Medication 10 ML: at 21:00

## 2022-08-13 RX ADMIN — ROSUVASTATIN 20 MG: 10 TABLET, FILM COATED ORAL at 19:55

## 2022-08-13 RX ADMIN — ACETAMINOPHEN 650 MG: 325 TABLET ORAL at 05:09

## 2022-08-13 RX ADMIN — LOSARTAN POTASSIUM 25 MG: 25 TABLET, FILM COATED ORAL at 08:33

## 2022-08-13 RX ADMIN — CARVEDILOL 3.12 MG: 3.12 TABLET, FILM COATED ORAL at 08:33

## 2022-08-13 RX ADMIN — EMPAGLIFLOZIN 25 MG: 10 TABLET, FILM COATED ORAL at 08:33

## 2022-08-13 RX ADMIN — METFORMIN HYDROCHLORIDE 500 MG: 500 TABLET ORAL at 17:09

## 2022-08-13 RX ADMIN — INSULIN LISPRO 4 UNITS: 100 INJECTION, SOLUTION INTRAVENOUS; SUBCUTANEOUS at 08:38

## 2022-08-13 RX ADMIN — MULTIPLE VITAMINS W/ MINERALS TAB 1 TABLET: TAB at 08:33

## 2022-08-13 RX ADMIN — ALOGLIPTIN 6.25 MG: 6.25 TABLET, FILM COATED ORAL at 08:33

## 2022-08-13 RX ADMIN — CLOPIDOGREL BISULFATE 75 MG: 75 TABLET ORAL at 08:33

## 2022-08-13 RX ADMIN — INSULIN LISPRO 12 UNITS: 100 INJECTION, SOLUTION INTRAVENOUS; SUBCUTANEOUS at 17:09

## 2022-08-13 RX ADMIN — METFORMIN HYDROCHLORIDE 500 MG: 500 TABLET ORAL at 08:33

## 2022-08-13 RX ADMIN — ENOXAPARIN SODIUM 40 MG: 100 INJECTION SUBCUTANEOUS at 08:34

## 2022-08-13 RX ADMIN — Medication 10 ML: at 19:56

## 2022-08-13 RX ADMIN — INSULIN LISPRO 4 UNITS: 100 INJECTION, SOLUTION INTRAVENOUS; SUBCUTANEOUS at 12:11

## 2022-08-13 ASSESSMENT — PAIN SCALES - GENERAL
PAINLEVEL_OUTOF10: 0
PAINLEVEL_OUTOF10: 4
PAINLEVEL_OUTOF10: 0

## 2022-08-13 ASSESSMENT — PAIN DESCRIPTION - DESCRIPTORS: DESCRIPTORS: ACHING

## 2022-08-13 ASSESSMENT — PAIN DESCRIPTION - ORIENTATION: ORIENTATION: LEFT

## 2022-08-13 ASSESSMENT — PAIN DESCRIPTION - LOCATION: LOCATION: SHOULDER

## 2022-08-13 NOTE — PROGRESS NOTES
[facility]   Cardiology Progress Note     Date: 8/13/2022  Admit Date: 8/10/2022     Reason for consultation: CHF     Chief Complaint:   Chief Complaint   Patient presents with    Shortness of Breath     SOB, can not walk, no sleep for 2 days, if she walks its gets work. History of Present Illness: History obtained from patient and medical record. Mendel Moynahan is a 78 y.o. female presented to the ED with shortness of breath that has occurred over the past 10 days but worse over the past 2 days. Denies cough. Denies leg swelling. She admits to orthopnea. No fever. She went to urgent care and was told she had hypoxia and was placed on nasal cannula oxygen and sent to ED. Rapid covid test was negative. No chest pain. She has history of DM. She is not on a statin, aspirin. Only on diabetic meds. She doesn't have a PCP as he just retired. Her labs in the ED showed elevated liver enzymes. No prior history of liver disease. No alcohol use. No abdominal pain, nausea, vomiting, melena, hematochezia. She has had 8 pound unintentional weight loss in the last year. No prior colonoscopy. Her echo shows low LVEF 15-20%. BNP elevated at 9808. Kidney functionis okay. LFTs are increased and she is mildly anemic. We are consulted for LV dysfunction. Today she is complaining of leg cramps that just started.(per consult note)    Interval Hx:   [facility] 8/12/22 s/p PCI of LAD. LVEDP 1mmHg. Pt reports she feels well today but is feeling tired. BP stable. Denies any pain. Breathing \"back to normal\". Right radial procedure site c/d/I. No hematoma. Mild bruising. Good pulses, color, warmth, and sensation to that extremity. Mild discomfort. Patient seen and examined. Clinical notes reviewed. Telemetry reviewed. No new complaints today. No major events overnight.    Denies having chest pain, palpitations, shortness of breath, orthopnea/PND, cough, or dizziness at the time of this visit. Past Medical History:  Past Medical History:   Diagnosis Date    Diabetes mellitus ([address]     Hypothyroid         Past Surgical History:    has no past surgical history on file. Social History:  Reviewed. reports that she does not drink alcohol. Allergies: Allergies   Allergen Reactions    Codeine      nausea  Other reaction(s): Vomiting       Family History:  Reviewed. family history is not on file. Denies family history of sudden cardiac death, arrhythmia, premature CAD    Home Meds:  Prior to Visit Medications    Medication Sig Taking? Authorizing Provider   levothyroxine (SYNTHROID) 50 MCG tablet Take 50 mcg by mouth in the morning. Historical Provider, MD   sitaGLIPtan-metFORMIN (JANUMET)  MG per tablet Take 0.5 tablets by mouth 2 times daily (with meals)  Historical Provider, MD   glipiZIDE (GLUCOTROL) 5 MG tablet Take 5 mg by mouth in the morning and 5 mg in the evening. Take before meals. Historical Provider, MD   metformin (GLUCOPHAGE) 500 MG tablet Take 1,000 mg by mouth 2 times daily (with meals). Patient not taking: Reported on 8/11/2022  Historical Provider, MD   glyBURIDE (DIABETA) 5 MG tablet Take 5 mg by mouth 2 times daily (with meals). Patient not taking: Reported on 8/11/2022  Historical Provider, MD   therapeutic multivitamin-minerals [facility]) tablet Take 1 tablet by mouth daily.   Historical Provider, MD        Scheduled Meds:   sodium chloride flush  5-40 mL IntraVENous 2 times per day    rosuvastatin  20 mg Oral Nightly    aspirin  81 mg Oral Daily    clopidogrel  75 mg Oral Daily    losartan  25 mg Oral Daily    carvedilol  3.125 mg Oral BID WC    alogliptin  6.25 mg Oral Daily    metFORMIN  500 mg Oral BID     empagliflozin  25 mg Oral Daily    insulin lispro  0-8 Units SubCUTAneous TID     insulin lispro  0-4 Units SubCUTAneous Nightly    therapeutic multivitamin-minerals  1 tablet Oral Daily    sodium chloride flush  5-40 mL IntraVENous 2 times per S1/S2 present without murmurs, no rubs or gallops. Peripheral pulses 2+, capillary refill < 3 seconds. No elevation of JVP. No peripheral edema  Respiratory: Respirations symmetric and unlabored. Lungs clear to auscultation bilaterally, no wheezing, crackles, or rhonchi  Gastrointestinal: Abdomen soft and round. Bowel sounds active without tenderness. Musculoskeletal: Bilateral upper and lower extremity strength with generalized weakness   Neurologic/Psych: Awake and orientated to person, place and time. Calm affect, appropriate mood    Pertinent labs, diagnostic, device, and imaging results reviewed as a part of this visit    Labs:    BMP:   Recent Labs     22  0505 22  0418 226   * 134* 132*   K 4.0 4.1 4.3   CL 96* 99 98*   CO2 25 24 21   BUN 25* 36* 35*   CREATININE 0.9 1.4* 1.3*   MG 1.80 1.90 2.20     Estimated Creatinine Clearance: 22 mL/min (A) (based on SCr of 1.3 mg/dL (H)).    CBC:   Recent Labs     22  0505 22  [id number]   WBC 8.0 8.1 8.3   HGB 11.7* 11.4* 11.4*   HCT 34.1* 32.7* 34.3*   MCV 90.4 89.9 93.0    289 286     Thyroid:   Lab Results   Component Value Date/Time    TSH 2.93 2022 05:04 AM     Lipids:   Lab Results   Component Value Date/Time    CHOL 195 2022 05:05 AM    HDL 63 2022 05:05 AM    TRIG 115 2022 05:05 AM     LFTS:   Lab Results   Component Value Date/Time    ALT 58 2022 04:18 AM    AST 32 2022 04:18 AM    ALKPHOS 122 2022 04:18 AM    PROT 6.6 2022 04:18 AM    AGRATIO 1.3 2022 05:05 AM    BILITOT 0.3 2022 04:18 AM     Cardiac Enzymes:   Lab Results   Component Value Date/Time    TROPONINI <0.01 08/10/2022 06:50 PM     Coags: No results found for: PROTIME, INR    EC/10/22  Normal sinus rhythm  Poor R wave progression  Abnormal ECG    ECHO:  22   Summary   -Overall left ventricular systolic function appears severely reduced.   -Ejection fraction is visually estimated to be 15-20%. -There is severe global hypokinesis with varying regional wall motion   abnormalities. -Grade II diastolic dysfunction with elevated LV filling pressures. e/e' =   16.8.   -Aortic valve appears sclerotic but opens adequately.   -Thickened mitral valve leaflets. -Mild mitral regurgitation.   -Trivial tricuspid regurgitation.   -Trivial pericardial effusion. Cardiac Cath PCI: 8/12/22  Anatomy:   LM-nml   LAD-mid 95%  Cx-nml  OM- nml  RCA-dom nml  RPDA- nml  LVEDP- 1     Intervention  ~Successful PCI to LAD with 2.25x18 ELIAS. Excellent Result. Contrast: 20  Flouro Time: 3.7  Access: R radial a     Impression  ~Coronary Angiography w/ severe single vessel CAD  ~Successful complex angioplasty and stenting of LAD     Recommendation  ~Aggressive medical treatment and risk factor modification  ~1. Post cath IVF. Bedrest.  2. Recommend beta blocker, ace/arb, high potency statin, aspirin and plavix for 6-12 months. 3. Referral to outpatient cardiac rehab phase II will be deferred until patient follow-up in office and then determine patient safety and appropriateness to proceed  4. Patient has been advised on the importance of regular exercise of at least 20-30 minutes daily. 5. Patient counseled about and offered assistance for smoking cessation  6. Follow up in 2 weeks with cardiology    CTPA: 8/11/22  No evidence of pulmonary embolism       Septal thickening, likely related to mild pulmonary vascular congestion. Moderate bilateral pleural effusions. Bronchial wall thickening, likely related to small airway disease or   bronchiolitis. Mucous plugging.        Problem List:   Patient Active Problem List    Diagnosis Date Noted    Acute systolic CHF (congestive heart failure) ([address] 08/12/2022    Acute pulmonary edema (HCC) 08/11/2022    Shortness of breath 26/68/1933    Acute systolic (congestive) heart failure ([address] 08/11/2022    CHF (congestive heart failure) ([address] 08/10/2022 DM2 (diabetes mellitus, type 2) ([facility] 08/10/2022    Elevated liver function tests 08/10/2022    Anemia 08/10/2022    Bilateral pleural effusion 08/10/2022    Acute on chronic heart failure, unspecified heart failure type ([facility] 08/10/2022    Acute respiratory failure with hypoxia ([facility] 08/10/2022    Suspected COVID-19 virus infection 08/10/2022        Assessment and Plan:     New onset acute systolic CHF   ~ Echo with newly reduced EF of 15-20% (no prior echo)  ~ LVEDP 1mmHg during [facility]. Received fluids. ~ started on coreg and losartan, monitor BP. No nancy at this time d/t hypotension but consider prior to d/c. Started on Jardiance. ~ will need repeat echo in 3 months     Coronary artery disease   ~ [facility] 8/12/22: s/p PCI of LAD with ELIAS  ~ on DAPT with plavix and aspirin and statin    Diabetes   ~ uncontrolled, A1C 10. 1. started on Jardiance. ~ per primary     Elevated LFTs/wt loss/diarrhea  ~ GI consult. Undergoing workup. Considering eventual colonoscopy. Multiple medical conditions with risk of decompensation. All pertinent information and plan of care discussed with the physician. All questions and concerns were addressed to the patient. Alternatives to my treatment were discussed. I have discussed the above stated plan with patient and the nurse. The patient verbalized understanding and agreed with the plan. Thank you for allowing to us to participate in the care of Jason Ayon [facility].. Total visit time > 35 minutes; > 50% spend counseling / coordinating care. I reviewed interval history, physical exam, review of data including labs, imaging, development and implementation of treatment plan and coordination of complex care. Counseled on risk factor modifications. Dannie Manning APRN-CNP  [address]   Office: [phone]    NOTE:  This report was transcribed using voice recognition software.   Every effort was made to ensure accuracy; however, inadvertent computerized

## 2022-08-13 NOTE — PROGRESS NOTES
Gastroenterology Progress Note      Jeanett Hashimoto is a 78 y.o. female patient. Principal Problem:    CHF (congestive heart failure) (HCC)  Active Problems:    DM2 (diabetes mellitus, type 2) (HCC)    Elevated liver function tests    Anemia    Bilateral pleural effusion    Acute on chronic heart failure, unspecified heart failure type (HCC)    Acute respiratory failure with hypoxia (HCC)    Suspected COVID-19 virus infection    Acute pulmonary edema (HCC)    Shortness of breath    Acute systolic (congestive) heart failure (HCC)    Acute systolic CHF (congestive heart failure) ([facility]  Resolved Problems:    * No resolved hospital problems. *      SUBJECTIVE: Patient is short of breath and is very cachectic and her parents    ROS:  No fever, chills  No chest pain, palpitations  No SOB, cough  Gastrointestinal ROS: no abdominal pain, nausea, vomiting     Physical    VITALS:  /64   Pulse 71   Temp 98.1 °F (36.7 °C) (Oral)   Resp 16   Ht 4' 10\" (1.473 m)   Wt 82 lb (37.2 kg)   SpO2 98%   BMI 17.14 kg/m²   TEMPERATURE:  Current - Temp: 98.1 °F (36.7 °C); Max - Temp  Av.1 °F (36.7 °C)  Min: 97.8 °F (36.6 °C)  Max: 98.5 °F (36.9 °C)    NAD  RRR, Nl s1s2  Lungs CTA Bilaterally, normal effort  Abdomen soft, ND, NT, no HSM, Bowel sounds normal.    Data    Data Review:    Recent Labs     22  0505 22  0419 226   WBC 8.0 8.1 8.3   HGB 11.7* 11.4* 11.4*   HCT 34.1* 32.7* 34.3*   MCV 90.4 89.9 93.0    289 286     Recent Labs     22  0505 22  0418 22  0446   * 134* 132*   K 4.0 4.1 4.3   CL 96* 99 98*   CO2 25 24 21   BUN 25* 36* 35*   CREATININE 0.9 1.4* 1.3*     Recent Labs     08/10/22  1850 22  0505 22  0418   AST 67* 57* 32   ALT 90* 84* 58*   BILIDIR  --  <0.2 <0.2   BILITOT 0.5 0.6 0.3   ALKPHOS 149* 148* 122     No results for input(s): LIPASE, AMYLASE in the last 72 hours. No results for input(s): PROTIME, INR in the last 72 hours.   No results for input(s): PTT in the last 72 hours.     Radiology Review:        ASSESSMENT patient underwent a heart cath and had a heart stent placed  Her problems seem to be more related to cardiac issues and GI issues    PLAN at this time from a GI standpoint we will take a step back from this patient's care please feel free to call us Monday if the patient continues to not do well from a GI standpoint    Amanda Olmedo MD

## 2022-08-14 VITALS
HEART RATE: 78 BPM | HEIGHT: 58 IN | WEIGHT: 81.2 LBS | TEMPERATURE: 97.6 F | SYSTOLIC BLOOD PRESSURE: 109 MMHG | RESPIRATION RATE: 18 BRPM | OXYGEN SATURATION: 96 % | DIASTOLIC BLOOD PRESSURE: 67 MMHG | BODY MASS INDEX: 17.04 KG/M2

## 2022-08-14 LAB
ANION GAP SERPL CALCULATED.3IONS-SCNC: 11 MMOL/L (ref 3–16)
BASOPHILS ABSOLUTE: 0 K/UL (ref 0–0.2)
BASOPHILS RELATIVE PERCENT: 0.7 %
BLOOD CULTURE, ROUTINE: NORMAL
BUN BLDV-MCNC: 29 MG/DL (ref 7–20)
CALCIUM SERPL-MCNC: 7.8 MG/DL (ref 8.3–10.6)
CHLORIDE BLD-SCNC: 103 MMOL/L (ref 99–110)
CO2: 22 MMOL/L (ref 21–32)
CREAT SERPL-MCNC: 1.1 MG/DL (ref 0.6–1.2)
CULTURE, BLOOD 2: NORMAL
EOSINOPHILS ABSOLUTE: 0.3 K/UL (ref 0–0.6)
EOSINOPHILS RELATIVE PERCENT: 4 %
GFR AFRICAN AMERICAN: 59
GFR NON-AFRICAN AMERICAN: 48
GLUCOSE BLD-MCNC: 215 MG/DL (ref 70–99)
GLUCOSE BLD-MCNC: 267 MG/DL (ref 70–99)
GLUCOSE BLD-MCNC: 270 MG/DL (ref 70–99)
GLUCOSE BLD-MCNC: 296 MG/DL (ref 70–99)
GLUCOSE BLD-MCNC: 312 MG/DL (ref 70–99)
HCT VFR BLD CALC: 33 % (ref 36–48)
HEMOGLOBIN: 11.2 G/DL (ref 12–16)
LYMPHOCYTES ABSOLUTE: 1.4 K/UL (ref 1–5.1)
LYMPHOCYTES RELATIVE PERCENT: 18.8 %
MAGNESIUM: 1.9 MG/DL (ref 1.8–2.4)
MCH RBC QN AUTO: 30.9 PG (ref 26–34)
MCHC RBC AUTO-ENTMCNC: 33.9 G/DL (ref 31–36)
MCV RBC AUTO: 91.3 FL (ref 80–100)
MONOCYTES ABSOLUTE: 0.6 K/UL (ref 0–1.3)
MONOCYTES RELATIVE PERCENT: 8 %
NEUTROPHILS ABSOLUTE: 5 K/UL (ref 1.7–7.7)
NEUTROPHILS RELATIVE PERCENT: 68.5 %
PDW BLD-RTO: 12.8 % (ref 12.4–15.4)
PERFORMED ON: ABNORMAL
PLATELET # BLD: 269 K/UL (ref 135–450)
PMV BLD AUTO: 8.1 FL (ref 5–10.5)
POTASSIUM SERPL-SCNC: 4 MMOL/L (ref 3.5–5.1)
PRO-BNP: 1630 PG/ML (ref 0–449)
RBC # BLD: 3.61 M/UL (ref 4–5.2)
SODIUM BLD-SCNC: 136 MMOL/L (ref 136–145)
WBC # BLD: 7.3 K/UL (ref 4–11)

## 2022-08-14 PROCEDURE — 36415 COLL VENOUS BLD VENIPUNCTURE: CPT

## 2022-08-14 PROCEDURE — 2580000003 HC RX 258: Performed by: INTERNAL MEDICINE

## 2022-08-14 PROCEDURE — 99233 SBSQ HOSP IP/OBS HIGH 50: CPT | Performed by: NURSE PRACTITIONER

## 2022-08-14 PROCEDURE — 6370000000 HC RX 637 (ALT 250 FOR IP): Performed by: INTERNAL MEDICINE

## 2022-08-14 PROCEDURE — 85025 COMPLETE CBC W/AUTO DIFF WBC: CPT

## 2022-08-14 PROCEDURE — 83880 ASSAY OF NATRIURETIC PEPTIDE: CPT

## 2022-08-14 PROCEDURE — 6360000002 HC RX W HCPCS: Performed by: INTERNAL MEDICINE

## 2022-08-14 PROCEDURE — 80048 BASIC METABOLIC PNL TOTAL CA: CPT

## 2022-08-14 PROCEDURE — 83735 ASSAY OF MAGNESIUM: CPT

## 2022-08-14 RX ORDER — METOPROLOL SUCCINATE 25 MG/1
12.5 TABLET, EXTENDED RELEASE ORAL NIGHTLY
Status: DISCONTINUED | OUTPATIENT
Start: 2022-08-14 | End: 2022-08-14 | Stop reason: HOSPADM

## 2022-08-14 RX ORDER — METOPROLOL SUCCINATE 25 MG/1
12.5 TABLET, EXTENDED RELEASE ORAL NIGHTLY
Qty: 30 TABLET | Refills: 1 | Status: SHIPPED | OUTPATIENT
Start: 2022-08-14

## 2022-08-14 RX ORDER — CARVEDILOL 3.12 MG/1
3.12 TABLET ORAL 2 TIMES DAILY WITH MEALS
Qty: 60 TABLET | Refills: 1 | Status: SHIPPED | OUTPATIENT
Start: 2022-08-14 | End: 2022-08-14 | Stop reason: HOSPADM

## 2022-08-14 RX ORDER — LOSARTAN POTASSIUM 25 MG/1
25 TABLET ORAL DAILY
Qty: 30 TABLET | Refills: 1 | Status: SHIPPED | OUTPATIENT
Start: 2022-08-14 | End: 2022-08-14 | Stop reason: HOSPADM

## 2022-08-14 RX ORDER — ROSUVASTATIN CALCIUM 20 MG/1
20 TABLET, COATED ORAL NIGHTLY
Qty: 30 TABLET | Refills: 1 | Status: SHIPPED | OUTPATIENT
Start: 2022-08-14 | End: 2022-09-16 | Stop reason: SDUPTHER

## 2022-08-14 RX ORDER — CLOPIDOGREL BISULFATE 75 MG/1
75 TABLET ORAL DAILY
Qty: 30 TABLET | Refills: 1 | Status: SHIPPED | OUTPATIENT
Start: 2022-08-15 | End: 2022-09-16 | Stop reason: SDUPTHER

## 2022-08-14 RX ADMIN — Medication 10 ML: at 09:22

## 2022-08-14 RX ADMIN — INSULIN LISPRO 8 UNITS: 100 INJECTION, SOLUTION INTRAVENOUS; SUBCUTANEOUS at 08:23

## 2022-08-14 RX ADMIN — MULTIPLE VITAMINS W/ MINERALS TAB 1 TABLET: TAB at 08:22

## 2022-08-14 RX ADMIN — METFORMIN HYDROCHLORIDE 500 MG: 500 TABLET ORAL at 08:22

## 2022-08-14 RX ADMIN — INSULIN LISPRO 8 UNITS: 100 INJECTION, SOLUTION INTRAVENOUS; SUBCUTANEOUS at 12:40

## 2022-08-14 RX ADMIN — ENOXAPARIN SODIUM 40 MG: 100 INJECTION SUBCUTANEOUS at 08:22

## 2022-08-14 RX ADMIN — CLOPIDOGREL BISULFATE 75 MG: 75 TABLET ORAL at 08:22

## 2022-08-14 RX ADMIN — ALOGLIPTIN 6.25 MG: 6.25 TABLET, FILM COATED ORAL at 08:22

## 2022-08-14 RX ADMIN — ASPIRIN 81 MG: 81 TABLET, COATED ORAL at 08:22

## 2022-08-14 RX ADMIN — Medication 10 ML: at 09:21

## 2022-08-14 ASSESSMENT — PAIN SCALES - GENERAL
PAINLEVEL_OUTOF10: 0
PAINLEVEL_OUTOF10: 0

## 2022-08-14 NOTE — CARE COORDINATION
Discharge Plan:   Patient discharged  on 8/14/22 to home with skilled [facility] service via [facility]  Phone: [phone]  Fax: [phone]   All discharge needs met per case management

## 2022-08-14 NOTE — CARE COORDINATION
Discharge Planning  Discharge order recommendations for home with home care. CM  met with patient and son . Introduced self and explained role of CM  and discharge planning. Patient and son agreeable to home with home care. Provided with the list of the area [facility] agencies. Their choice -[facility] . Referral was called and accepted with the start of care on 8/15/22. Patient and son updated were in agreement.

## 2022-08-14 NOTE — CARE COORDINATION
The Plan for Transition of Care is related to the following treatment goals: skilled [facility] services    The Patient and/or patient representative -patient & son was provided with a choice of provider and agrees   with the discharge plan. [x] Yes [] No    Freedom of choice list was provided with basic dialogue that supports the patient's individualized plan of care/goals, treatment preferences and shares the quality data associated with the providers.  [x] Yes [] No

## 2022-08-14 NOTE — DISCHARGE SUMMARY
[facility] -- Physician Discharge Summary     Pradeep Lin  1945  MRN: [id number]    Admit Date: 8/10/2022  Discharge Date: No discharge date for patient encounter. Attending MD: Jennifer Cueva MD  Discharging MD: Jennifer Cueva MD  PCP: Gutierrez Chanel MD [address] [place] [address] Monie Sanchez [phone]    Admission Diagnosis: Shortness of breath [R06.02]  Acute pulmonary edema ([address] [J81.0]  Hyperglycemia [R73.9]  Bilateral pleural effusion [J90]  Acute on chronic heart failure, unspecified heart failure type ([address] [I50.9]  Person under investigation for COVID-19 [Z20.822]  DISCHARGE DIAGNOSIS: same    Full Hospital Problem List:  [facility]/[address] Problems    Diagnosis Date Noted    Acute systolic CHF (congestive heart failure) ([address] [I50.21] 08/12/2022     Priority: Medium    Acute pulmonary edema ([address] [J81.0] 08/11/2022     Priority: Medium    Shortness of breath [R06.02] 08/11/2022     Priority: Medium    Acute systolic (congestive) heart failure ([address] [I50.21] 08/11/2022     Priority: Medium    CHF (congestive heart failure) ([address] [I50.9] 08/10/2022     Priority: Medium    DM2 (diabetes mellitus, type 2) ([address] [E11.9] 08/10/2022     Priority: Medium    Elevated liver function tests [R79.89] 08/10/2022     Priority: Medium    Anemia [D64.9] 08/10/2022     Priority: Medium    Bilateral pleural effusion [J90] 08/10/2022     Priority: Medium    Acute on chronic heart failure, unspecified heart failure type ([address] [I50.9] 08/10/2022     Priority: Medium    Acute respiratory failure with hypoxia ([address] [J96.01] 08/10/2022     Priority: Medium    Suspected COVID-19 virus infection [Z20.822] 08/10/2022     Priority: Medium           Hospital Course:  Marcus Villa is a 77 yo female who presented to the ED with shortness of breath that has occurred over the past 10 days but worse over the past 2 days. Denies cough. Denies leg swelling. She admits to orthopnea. No fever.   She went MD; Consulting Physician: Oracio Pompa MD; Utilization Reviewer: Benito Colin RN; Respiratory Therapist (Day): Kavita Hargrove RCP; Registered Nurse: Arlin Ferguson RN    Imaging Results:  Echo Complete    Result Date: 8/11/2022  Transthoracic Echocardiography Report (TTE)  Demographics   Patient Name       Nasreen Reynolds   Date of Study      08/11/2022         Gender              Female   Patient Number     [id number]         Date of Birth       1945   Visit Number       [id number]          Age                 78 year(s)   Accession Number   [id number]         Room Number         6752   Corporate ID       [id number]           Sonographer         Mihaela Gonzalez                                                            [facility]   Ordering Physician Huang Branch,  Interpreting        Rafael Jimenez MD                 Physician           MD, [facility]  Procedure Type of Study   TTE procedure:ECHOCARDIOGRAM COMPLETE 2D W DOPPLER W COLOR. Procedure Date Date: 08/11/2022 Start: 11:53 AM Study Location: [facility] - Echo Lab Technical Quality: Adequate visualization Indications:Congestive heart failure. Patient Status: Routine Height: 58 inches Weight: 81 pounds BSA: 1.24 m2 BMI: 16.93 kg/m2 BP: 126/67 mmHg  Conclusions   Summary  -Overall left ventricular systolic function appears severely reduced.  -Ejection fraction is visually estimated to be 15-20%. -There is severe global hypokinesis with varying regional wall motion  abnormalities. -Grade II diastolic dysfunction with elevated LV filling pressures. e/e' =  16.8.  -Aortic valve appears sclerotic but opens adequately.  -Thickened mitral valve leaflets. -Mild mitral regurgitation.  -Trivial tricuspid regurgitation.  -Trivial pericardial effusion.    Signature   ------------------------------------------------------------------  Electronically signed by Rafael Jimenez MD, [facility] (Interpreting  physician) on 08/11/2022 at 01:41 PM ------------------------------------------------------------------   Findings   Left Ventricle  Left ventricular cavity size is normal. Overall left ventricular systolic  function appears severely reduced. Ejection fraction is visually estimated  to be 15-20%. There is severe global hypokinesis with varying regional wall motion  abnormalities.  e/e' = 16.8. Grade II diastolic dysfunction with elevated LV filling  pressures. Mitral Valve  Thickened mitral valve leaflets. Mild mitral regurgitation. Left Atrium  The left atrium is normal in size. Aortic Valve  Aortic valve appears sclerotic but opens adequately. No evidence of aortic valve regurgitation. Aorta  The aortic root is normal in size. Right Ventricle  The right ventricle is normal in size with reduced function. TAPSE: 1.04 cm. RVS velocity: 7.29 cm/s. Tricuspid Valve  Tricuspid valve is structurally normal.  Trivial tricuspid regurgitation. Right Atrium  The right atrial size is normal.   Pulmonic Valve  The pulmonic valve is not well visualized. No evidence of pulmonic valve  regurgitation. Pericardial Effusion  Trivial pericardial effusion. Pleural Effusion  No pleural effusion. Miscellaneous  IVC size is normal (<2.1cm) and collapses > 50% with respiration consistent  with normal RA pressure (3mmHg).   M-Mode/2D Measurements (cm)   LV Diastolic Dimension: 7.70 cm LV Systolic Dimension: 3.88 cm  LV Septum Diastolic: 9.12 cm  LV PW Diastolic: 3.43 cm        AO Root Dimension: 2.1 cm                                  LA Dimension: 2.2 cm                                  LA Area: 8.36 cm2  LVOT: 2 cm                      LA volume/Index: 13.5 ml /11 ml/m2  Doppler Measurements   AV Peak Velocity: 115 cm/s     MV Peak E-Wave: 70.3 cm/s  AV Peak Gradient: 5.29 mmHg    MV Peak A-Wave: 68.9 cm/s  AV Mean Gradient: 3 mmHg       MV E/A Ratio: 1.02  LVOT Peak Velocity: 47.5 cm/s  AV Area (Continuity):1.08 cm2  MV Max P mmHg MV Vmax:173 cm/s  TR Velocity:197 cm/s  TR Gradient:15.52 mmHg  Estimated RAP:3 mmHg           MV Deceleration Time: 232 msec  Estimated RVSP: 18 mmHg  E' Septal Velocity: 3.7 cm/s  E' Lateral Velocity: 4.14 cm/s   Aortic Valve   Peak Velocity: 115 cm/s     Mean Velocity: 81.4 cm/s  Peak Gradient: 5.29 mmHg    Mean Gradient: 3 mmHg  Area (continuity): 1.08 cm2  AV VTI: 23.1 cm  Aorta   Aortic Root: 2.1 cm  Ascending Aorta: 2.2 cm  LVOT Diameter: 2 cm      XR CHEST (2 VW)    Result Date: 8/11/2022  EXAMINATION: TWO XRAY VIEWS OF THE CHEST 8/11/2022 4:07 pm COMPARISON: 08/10/2022 HISTORY: ORDERING SYSTEM PROVIDED HISTORY: f/u effusion TECHNOLOGIST PROVIDED HISTORY: Reason for exam:->f/u effusion Reason for Exam: Shortness of Breath FINDINGS: The effusions seen on the previous CT are not as well visualized currently, and they may be in the sub pulmonic location. Pulmonary vasculature is mildly congested. No pneumothorax is found. Cardial pericardial silhouette unremarkable. Effusions seen on the CT PA are not well visualized currently. These have either resolved or are now in a sub pulmonic location. US GALLBLADDER RUQ    Result Date: 8/12/2022  EXAMINATION: RIGHT UPPER QUADRANT ULTRASOUND 8/12/2022 2:07 pm COMPARISON: None. HISTORY: ORDERING SYSTEM PROVIDED HISTORY: elevated lft TECHNOLOGIST PROVIDED HISTORY: Reason for exam:->elevated lft FINDINGS: LIVER:  The liver demonstrates normal echogenicity without evidence of intrahepatic biliary ductal dilatation. BILIARY SYSTEM:  Gallbladder is unremarkable without evidence of pericholecystic fluid, wall thickening or stones. Negative sonographic Hoff's sign. Common bile duct is within normal limits measuring 6 mm. RIGHT KIDNEY: The right kidney is grossly unremarkable without evidence of hydronephrosis. PANCREAS:  Visualized portions of the pancreas are unremarkable. OTHER: No evidence of right upper quadrant ascites.      Unremarkable right upper quadrant ultrasound. XR CHEST PORTABLE    Result Date: 8/10/2022  EXAMINATION: ONE XRAY VIEW OF THE CHEST 8/10/2022 6:26 pm COMPARISON: None. HISTORY: ORDERING SYSTEM PROVIDED HISTORY: SOB TECHNOLOGIST PROVIDED HISTORY: Reason for exam:->SOB Reason for Exam: SHOB FINDINGS: The cardiac silhouette is enlarged. Aortic vascular calcification. There is vascular congestion with perihilar edema along with small bilateral effusions most consistent with CHF. Mild dependent atelectasis is suggested bilaterally. CHF with cardiomegaly, perihilar edema and small bilateral effusions. CT CHEST PULMONARY EMBOLISM W CONTRAST    Result Date: 8/10/2022  EXAMINATION: CTA OF THE CHEST 8/10/2022 8:08 pm TECHNIQUE: CTA of the chest was performed after the administration of intravenous contrast.  Multiplanar reformatted images are provided for review. MIP images are provided for review. Automated exposure control, iterative reconstruction, and/or weight based adjustment of the mA/kV was utilized to reduce the radiation dose to as low as reasonably achievable. COMPARISON: None. HISTORY: ORDERING SYSTEM PROVIDED HISTORY: +SOB, +ddimer TECHNOLOGIST PROVIDED HISTORY: Reason for exam:->+SOB, +ddimer Decision Support Exception - unselect if not a suspected or confirmed emergency medical condition->Emergency Medical Condition (MA) Reason for Exam: +SOB, +ddimer FINDINGS: Pulmonary Arteries: Pulmonary arteries are adequately opacified for evaluation. No evidence of intraluminal filling defect to suggest pulmonary embolism. Main pulmonary artery is normal in caliber. Mediastinum: No evidence of mediastinal lymphadenopathy. The heart and pericardium demonstrate no acute abnormality. There is no acute abnormality of the thoracic aorta. Lungs/pleura: There is septal thickening, likely related to mild pulmonary vascular congestion. There are moderate bilateral pleural effusions.   There is mild dependent and discoid atelectasis at the bilateral lung bases. There is bronchial wall thickening, likely related to small airway disease or bronchiolitis. There is mucous plugging. No pneumothorax. Upper Abdomen: Limited images of the upper abdomen are unremarkable. Soft Tissues/Bones: No acute bone or soft tissue abnormality. No evidence of pulmonary embolism Septal thickening, likely related to mild pulmonary vascular congestion. Moderate bilateral pleural effusions. Bronchial wall thickening, likely related to small airway disease or bronchiolitis. Mucous plugging. Discharge Exam:  BP 96/62   Pulse 88   Temp 98.1 °F (36.7 °C) (Oral)   Resp 18   Ht 4' 10\" (1.473 m)   Wt 81 lb 3.2 oz (36.8 kg)   SpO2 98%   BMI 16.97 kg/m²   General appearance: alert, appears stated age, and cooperative  Head: Normocephalic, without obvious abnormality, atraumatic  Lungs: clear to auscultation bilaterally  Heart: regular rate and rhythm, S1, S2 normal, no murmur, click, rub or gallop  Abdomen: soft, non-tender; bowel sounds normal; no masses,  no organomegaly  Extremities: extremities normal, atraumatic, no cyanosis or edema    Disposition: home    Condition: stable    Discharge Medications:     Medication List        START taking these medications      carvedilol 3.125 MG tablet  Commonly known as: COREG  Take 1 tablet by mouth in the morning and 1 tablet in the evening. Take with meals. clopidogrel 75 MG tablet  Commonly known as: PLAVIX  Take 1 tablet by mouth in the morning. Start taking on: August 15, 2022     empagliflozin 25 MG tablet  Commonly known as: Jardiance  Take 1 tablet by mouth in the morning. losartan 25 MG tablet  Commonly known as: COZAAR  Take 1 tablet by mouth in the morning.      rosuvastatin 20 MG tablet  Commonly known as: CRESTOR  Take 1 tablet by mouth nightly            CONTINUE taking these medications      glipiZIDE 5 MG tablet  Commonly known as: GLUCOTROL     Janumet  MG per tablet  Generic drug: sitaGLIPtan-metFORMIN     levothyroxine 50 MCG tablet  Commonly known as: SYNTHROID     therapeutic multivitamin-minerals tablet            STOP taking these medications      cephALEXin 500 MG capsule  Commonly known as: KEFLEX     clindamycin 300 MG capsule  Commonly known as: CLEOCIN     glyBURIDE 5 MG tablet  Commonly known as: DIABETA     metFORMIN 500 MG tablet  Commonly known as: GLUCOPHAGE               Where to Get Your Medications        You can get these medications from any pharmacy    Bring a paper prescription for each of these medications  carvedilol 3.125 MG tablet  clopidogrel 75 MG tablet  empagliflozin 25 MG tablet  losartan 25 MG tablet  rosuvastatin 20 MG tablet            Allergies: Allergies   Allergen Reactions    Codeine      nausea  Other reaction(s): Vomiting       Follow up Instructions: Follow-up with PCP: Jarod Hankins MD in 2 wk .       Total time spent on day of discharge including face-to-face visit, examination, documentation, counseling, preparation of discharge plans and followup, and discharge medicine reconciliation and presciptions is 34 minutes    Signed:  Jesus Jeong MD  8/14/2022

## 2022-08-14 NOTE — PROGRESS NOTES
[facility]   Cardiology Progress Note     Date: 8/14/2022  Admit Date: 8/10/2022     Reason for consultation: CHF     Chief Complaint:   Chief Complaint   Patient presents with    Shortness of Breath     SOB, can not walk, no sleep for 2 days, if she walks its gets work. History of Present Illness: History obtained from patient and medical record. Elisabet Arevalo is a 78 y.o. female presented to the ED with shortness of breath that has occurred over the past 10 days but worse over the past 2 days. Denies cough. Denies leg swelling. She admits to orthopnea. No fever. She went to urgent care and was told she had hypoxia and was placed on nasal cannula oxygen and sent to ED. Rapid covid test was negative. No chest pain. She has history of DM. She is not on a statin, aspirin. Only on diabetic meds. She doesn't have a PCP as he just retired. Her labs in the ED showed elevated liver enzymes. No prior history of liver disease. No alcohol use. No abdominal pain, nausea, vomiting, melena, hematochezia. She has had 8 pound unintentional weight loss in the last year. No prior colonoscopy. Her echo shows low LVEF 15-20%. BNP elevated at 9808. Kidney functionis okay. LFTs are increased and she is mildly anemic. We are consulted for LV dysfunction. Today she is complaining of leg cramps that just started.(per consult note)    Interval Hx:   [facility] 8/12/22 s/p PCI of LAD. No chest pain or sob today. No lightheadedness or dizziness. Brother at bedside. Pt worried about her blood sugar. Patient seen and examined. Clinical notes reviewed. Telemetry reviewed. No new complaints today. No major events overnight. Denies having chest pain, palpitations, shortness of breath, orthopnea/PND, cough, or dizziness at the time of this visit.       Past Medical History:  Past Medical History:   Diagnosis Date    Diabetes mellitus ([address]     Hypothyroid         Past Surgical History:    has no past surgical history on file. Social History:  Reviewed. reports that she does not drink alcohol. Allergies: Allergies   Allergen Reactions    Codeine      nausea  Other reaction(s): Vomiting       Family History:  Reviewed. family history is not on file. Denies family history of sudden cardiac death, arrhythmia, premature CAD    Home Meds:  Prior to Visit Medications    Medication Sig Taking? Authorizing Provider   carvedilol (COREG) 3.125 MG tablet Take 1 tablet by mouth in the morning and 1 tablet in the evening. Take with meals. Yes Matt Fritz MD   losartan (COZAAR) 25 MG tablet Take 1 tablet by mouth in the morning. Yes Matt Fritz MD   rosuvastatin (CRESTOR) 20 MG tablet Take 1 tablet by mouth nightly Yes Matt Fritz MD   clopidogrel (PLAVIX) 75 MG tablet Take 1 tablet by mouth in the morning. Yes Matt Fritz MD   empagliflozin (JARDIANCE) 25 MG tablet Take 1 tablet by mouth in the morning. Yes Matt Fritz MD   levothyroxine (SYNTHROID) 50 MCG tablet Take 50 mcg by mouth in the morning. Historical Provider, MD   sitaGLIPtan-metFORMIN (JANUMET)  MG per tablet Take 0.5 tablets by mouth 2 times daily (with meals)  Historical Provider, MD   glipiZIDE (GLUCOTROL) 5 MG tablet Take 5 mg by mouth in the morning and 5 mg in the evening. Take before meals. Historical Provider, MD   therapeutic multivitamin-minerals (THERAGRAN-M) tablet Take 1 tablet by mouth daily.   Historical Provider, MD        Scheduled Meds:   insulin lispro  0-16 Units SubCUTAneous TID WC    insulin lispro  0-4 Units SubCUTAneous Nightly    sodium chloride flush  5-40 mL IntraVENous 2 times per day    rosuvastatin  20 mg Oral Nightly    aspirin  81 mg Oral Daily    clopidogrel  75 mg Oral Daily    losartan  25 mg Oral Daily    carvedilol  3.125 mg Oral BID     alogliptin  6.25 mg Oral Daily    metFORMIN  500 mg Oral BID     therapeutic multivitamin-minerals  1 tablet Oral Daily    sodium chloride flush 5-40 mL IntraVENous 2 times per day    enoxaparin  40 mg SubCUTAneous Daily     Continuous Infusions:   sodium chloride Stopped (08/13/22 0454)    sodium chloride      dextrose      sodium chloride       PRN Meds:sodium chloride flush, sodium chloride, acetaminophen, glucose, dextrose bolus **OR** dextrose bolus, glucagon (rDNA), dextrose, perflutren lipid microspheres, sodium chloride flush, sodium chloride, ondansetron **OR** ondansetron, magnesium hydroxide, acetaminophen **OR** acetaminophen, hydrALAZINE, sodium chloride, potassium chloride **OR** potassium alternative oral replacement **OR** potassium chloride     Review of Systems:  Constitutional: Negative for fever, night sweats, chills,  Skin: Negative for rash, pruritus, bleeding, or bruising    HEENT: Negative for vision changes, ringing in the ears, dysphagia  Respiratory: Reviewed in HPI  Cardiovascular: Reviewed in HPI  Gastrointestinal: Negative for abdominal pain, N/V/D, constipation, or black/tarry stools  Genito-Urinary: Negative for dysuria, incontinence, or hematuria  Musculoskeletal: Negative for joint swelling, muscle pain, or injuries  Neurological/Psych: Negative for confusion, seizures, headaches, or TIA-like symptoms. No anxiety or depression. Physical Examination:  Vitals:    08/14/22 0737   BP: 96/62   Pulse: 88   Resp: 18   Temp: 98.1 °F (36.7 °C)   SpO2: 98%      In: 720 [P.O.:720]  Out: 900    Wt Readings from Last 3 Encounters:   08/14/22 81 lb 3.2 oz (36.8 kg)       Intake/Output Summary (Last 24 hours) at 8/14/2022 1007  Last data filed at 8/14/2022 0601  Gross per 24 hour   Intake 720 ml   Output 900 ml   Net -180 ml       Telemetry: Personally Reviewed  - Sinus rhythm   Constitutional: Cooperative and in no apparent distress, and appears frail   Skin: Warm and pink; no pallor, cyanosis, clubbing, or bruising. Right radial procedure site c/d/I. No hematoma or bruising.  Good pulses, color, warmth, and sensation to that extremity. HEENT: Symmetric and normocephalic. PERRL. Conjunctiva pink with clear sclera. Mucus membranes moist.   Cardiovascular: Regular rate and rhythm. S1/S2 present without murmurs, no rubs or gallops. Peripheral pulses 2+, capillary refill < 3 seconds. No elevation of JVP. No peripheral edema  Respiratory: Respirations symmetric and unlabored. Lungs clear to auscultation bilaterally, no wheezing, crackles, or rhonchi  Gastrointestinal: Abdomen soft and round. Bowel sounds active without tenderness. Musculoskeletal: Bilateral upper and lower extremity strength with generalized weakness   Neurologic/Psych: Awake and orientated to person, place and time. Calm affect, appropriate mood    Pertinent labs, diagnostic, device, and imaging results reviewed as a part of this visit    Labs:    BMP:   Recent Labs     22  0617   * 132* 136   K 4.1 4.3 4.0   CL 99 98* 103   CO2 24 21 22   BUN 36* 35* 29*   CREATININE 1.4* 1.3* 1.1   MG 1.90 2.20 1.90     Estimated Creatinine Clearance: 26 mL/min (based on SCr of 1.1 mg/dL).    CBC:   Recent Labs     22  0617   WBC 8.1 8.3 7.3   HGB 11.4* 11.4* 11.2*   HCT 32.7* 34.3* 33.0*   MCV 89.9 93.0 91.3    286 269     Thyroid:   Lab Results   Component Value Date/Time    TSH 2.93 2022 05:04 AM     Lipids:   Lab Results   Component Value Date/Time    CHOL 195 2022 05:05 AM    HDL 63 2022 05:05 AM    TRIG 115 2022 05:05 AM     LFTS:   Lab Results   Component Value Date/Time    ALT 58 2022 04:18 AM    AST 32 2022 04:18 AM    ALKPHOS 122 2022 04:18 AM    PROT 6.6 2022 04:18 AM    AGRATIO 1.3 2022 05:05 AM    BILITOT 0.3 2022 04:18 AM     Cardiac Enzymes:   Lab Results   Component Value Date/Time    TROPONINI <0.01 08/10/2022 06:50 PM     Coags: No results found for: PROTIME, INR    EC/10/22  Normal sinus rhythm  Poor R wave 08/11/2022    Shortness of breath 38/79/7869    Acute systolic (congestive) heart failure ([facility] 08/11/2022    CHF (congestive heart failure) ([facility] 08/10/2022    DM2 (diabetes mellitus, type 2) ([facility] 08/10/2022    Elevated liver function tests 08/10/2022    Anemia 08/10/2022    Bilateral pleural effusion 08/10/2022    Acute on chronic heart failure, unspecified heart failure type ([facility] 08/10/2022    Acute respiratory failure with hypoxia ([facility] 08/10/2022    Suspected COVID-19 virus infection 08/10/2022        Assessment and Plan:     New onset acute systolic CHF   ~ Echo with newly reduced EF of 15-20% (no prior echo)  ~ LVEDP 1mmHg during LHC. Received fluids. ~ pt hypotensive on coreg and losartan. Stop both. Start toprol 12.5mg nightly. Consider ACE/ARB/nancy as OP. Started on Jardiance. ~ will need repeat echo in 3 months     Coronary artery disease   ~ [facility] 8/12/22: s/p PCI of LAD with ELIAS  ~ on DAPT with plavix, aspirin, bb, and statin    Diabetes   ~ uncontrolled, A1C 10. 1. started on Jardiance. ~ per primary     Elevated LFTs/wt loss/diarrhea  ~ evaluated by GI    Pt stable from a cardiac standpoint. Follow up in CHF clinic within 1 week. Multiple medical conditions with risk of decompensation. All pertinent information and plan of care discussed with the physician. All questions and concerns were addressed to the patient. Alternatives to my treatment were discussed. I have discussed the above stated plan with patient and the nurse. The patient verbalized understanding and agreed with the plan. Thank you for allowing to us to participate in the care of Jason [address] Total visit time > 35 minutes; > 50% spend counseling / coordinating care. I reviewed interval history, physical exam, review of data including labs, imaging, development and implementation of treatment plan and coordination of complex care. Counseled on risk factor modifications.      Sofy Guzman APRN-CNP  [facility]   Office: [phone]    NOTE:  This report was transcribed using voice recognition software. Every effort was made to ensure accuracy; however, inadvertent computerized transcription errors may be present.

## 2022-08-14 NOTE — PROGRESS NOTES
Data- discharge order received, pt verbalized agreement to discharge, disposition to previous residence, no needs for HHC/DME. Action- discharge instructions prepared and given to pt, pt verbalized understanding. Medication information packet given r/t NEW and/or CHANGED prescriptions emphasizing name/purpose/side effects, pt verbalized understanding. Discharge instruction summary: Diet- carb control, Activity- independent, Primary Care Physician as follows: Gutierrez Chanel [phone] f/u appointment reviewed and complete, immunizations reviewed and complete, prescription medications filled and complete. Inpatient surgical procedure precautions reviewed:  CHF Education reviewed. Pt/ Family has had a total of 60 minutes CHF education this admission encounter. 1. WEIGHT: Admit Weight: 85 lb 14.4 oz (39 kg) (08/10/22 1803)        Today  Weight: 81 lb 3.2 oz (36.8 kg) (08/14/22 0400)       2. O2 SAT.: SpO2: 96 % (08/14/22 1159)    Response- Pt belongings gathered, IV removed. Disposition is home (no HHC/DME needs), transported with RN, taken to lobby via w/c w/ Wheelchair, no complications.

## 2022-08-14 NOTE — DISCHARGE INSTR - COC
Continuity of Care Form    Patient Name: Rios Delgado   :  1945  MRN:  [id number]    Admit date:  8/10/2022  Discharge date:  2022    Code Status Order: Full Code   Advance Directives:     Admitting Physician:  Chris Good MD  PCP: Mike Gonzales MD    Discharging Nurse: [facility]/Room#: 4RG-9043/7465-10  Discharging Unit Phone Number: [phone]    Emergency Contact:   Extended Emergency Contact Information  Primary Emergency Contact: Mahin Gooden  Address: [address] [place] [address], [facility] [address]  Home Phone: [phone]  Relation: Spouse    Past Surgical History:  History reviewed. No pertinent surgical history.     Immunization History:   Immunization History   Administered Date(s) Administered    COVID-19, J&J, (age 18y+), IM, 0.5 mL 2021    COVID-19, PFIZER GRAY top, DO NOT Dilute, (age 15 y+), IM, 30 mcg/0.3 mL 2022    COVID-19, PFIZER PURPLE top, DILUTE for use, (age 15 y+), 30mcg/0.3mL 10/25/2021       Active Problems:  Patient Active Problem List   Diagnosis Code    CHF (congestive heart failure) ([facility]) I50.9    DM2 (diabetes mellitus, type 2) ([facility]) E11.9    Elevated liver function tests R79.89    Anemia D64.9    Bilateral pleural effusion J90    Acute on chronic heart failure, unspecified heart failure type ([facility] I50.9    Acute respiratory failure with hypoxia ([facility]) J96.01    Suspected COVID-19 virus infection Z20.822    Acute pulmonary edema ([facility]) J81.0    Shortness of breath T85.38    Acute systolic (congestive) heart failure ([facility]) A53.75    Acute systolic CHF (congestive heart failure) ([facility] [address] I50.21       Isolation/Infection:   Isolation            No Isolation          Patient Infection Status       Infection Onset Added Last Indicated Last Indicated By Review Planned Expiration Resolved Resolved By    None active    Resolved    COVID-19 (Rule Out) 08/10/22 08/10/22 08/10/22 COVID-19 (Ordered)   22 Rule-Out Test Resulted Nurse Assessment:  Last Vital Signs: /67   Pulse 78   Temp 97.6 °F (36.4 °C) (Oral)   Resp 18   Ht 4' 10\" (1.473 m)   Wt 81 lb 3.2 oz (36.8 kg)   SpO2 96%   BMI 16.97 kg/m²     Last documented pain score (0-10 scale): Pain Level: 0  Last Weight:   Wt Readings from Last 1 Encounters:   08/14/22 81 lb 3.2 oz (36.8 kg)     Mental Status:  oriented    IV Access:  - None    Nursing Mobility/ADLs:  Walking   Independent  Transfer  Independent  Bathing  Independent  Dressing  Independent  Toileting  Independent  Feeding  Independent  Med Admin  Dependent  Med Delivery   whole    Wound Care Documentation and Therapy:        Elimination:  Continence: Bowel: No  Bladder: No  Urinary Catheter: None   Colostomy/Ileostomy/Ileal Conduit: No       Date of Last BM: 08/13/22    Intake/Output Summary (Last 24 hours) at 8/14/2022 1356  Last data filed at 8/14/2022 0601  Gross per 24 hour   Intake 480 ml   Output 900 ml   Net -420 ml     I/O last 3 completed shifts: In: 1375.5 [P.O.:720; I.V.:655.5]  Out: 1850 [Urine:1850]    Safety Concerns:     None    Impairments/Disabilities:      None    Nutrition Therapy:  Current Nutrition Therapy:   - Oral Diet:  Carb Control 4 carbs/meal (1800kcals/day)    Routes of Feeding: Oral  Liquids: No Restrictions  Daily Fluid Restriction: maximum upto 2L/ day  Last Modified Barium Swallow with Video (Video Swallowing Test): not done    Treatments at the Time of Hospital Discharge:   Respiratory Treatments: n/a  Oxygen Therapy:  is not on home oxygen therapy.   Ventilator:    - No ventilator support    Rehab Therapies: Physical Therapy and Occupational Therapy  Weight Bearing Status/Restrictions: No weight bearing restrictions  Other Medical Equipment (for information only, NOT a DME order):  n/a  Other Treatments: n/a    Patient's personal belongings (please select all that are sent with patient):  Pt's personal belongings    RN SIGNATURE:  Electronically signed by Cecily Atkinson CARMEN Beck on 8/14/22 at 2:15 PM EDT    CASE MANAGEMENT/SOCIAL WORK SECTION    Inpatient Status Date: 8/10/22    Readmission Risk Assessment Score:  Readmission Risk              Risk of Unplanned Readmission:  18           Discharging to Facility/ Agency   Name:  [facility]  Phone: [phone]  Fax: [phone]   Address:  Phone:  Fax:    Dialysis Facility (if applicable)   Name:  Address:  Dialysis Schedule:  Phone:  Fax:    / signature: Electronically signed by Concha Urbina RN on 8/14/22 at 1:58 PM EDT    PHYSICIAN SECTION    Prognosis: Fair    Condition at Discharge: Stable    Rehab Potential (if transferring to Rehab): Good    Recommended Labs or Other Treatments After Discharge:      Physician Certification: I certify the above information and transfer of Mendel Moynahan  is necessary for the continuing treatment of the diagnosis listed and that she requires Home Care for greater 30 days.      Update Admission H&P: No change in H&P    PHYSICIAN SIGNATURE:  Electronically signed by Rafael Moseley MD on 8/14/22 at 2:08 PM EDT

## 2022-08-14 NOTE — CARE COORDINATION
Discharge Planning Assessment  Readmission score 10%  RN/SW discharge planner met with patient and family member-son  to discuss reason for admission, current living situation, and potential needs at the time of discharge    Demographics/Insurance verified [facility] Medicare    Current type of dwellin donna house with 2 steps to enter and 13 to go upstairs with railing    Patient from ECF/SW confirmed with: N/A    Living arrangements: lives with spouse    Level of function/Support: Independent with ADLs    PCP: Dr Martinez Rater    Last Visit to PCP: 2022    DME: None    Active with any community resources/agencies/skilled home care: No  Agreeable to hhc services as recommended. Son was provided  with the list  to choose from.     Medication compliance issues: Denies issies    Financial issues that could impact healthcare: No      Tentative discharge plan: home with skilled hhc services    Discussed and provided facilities of choice if transition to a skilled nursing facility is required at the time of discharge- no      Transportation at the time of discharge: son will assist.

## 2022-08-15 ENCOUNTER — TELEPHONE (OUTPATIENT)
Dept: CARDIOLOGY CLINIC | Age: 77
End: 2022-08-15

## 2022-08-15 LAB — F-ACTIN AB IGG: 7 UNITS (ref 0–19)

## 2022-08-15 RX ORDER — ASPIRIN 81 MG/1
81 TABLET ORAL DAILY
Qty: 90 TABLET | Refills: 3 | Status: SHIPPED | OUTPATIENT
Start: 2022-08-15

## 2022-08-15 NOTE — TELEPHONE ENCOUNTER
Lvm for patient to return call. She was just discharged from hospital and should be taking 81 mg aspirin. Rx sent by [facility].

## 2022-08-17 LAB
ANA PATTERN: ABNORMAL
ANA TITER: ABNORMAL
ANTINUCLEAR AB INTERPRETIVE COMMENT: ABNORMAL
ANTINUCLEAR ANTIBODY, HEP-2, IGG: DETECTED

## 2022-08-17 NOTE — PROGRESS NOTES
Physician Progress Note      PATIENT:               Abdi Ronquillo  CSN #:                  [id number]  :                       1945  ADMIT DATE:       8/10/2022 5:56 PM  [address] [place] DATE:        2022 3:01 PM  RESPONDING  PROVIDER #:        Ruben Osborn MD          QUERY TEXT:    Patient admitted with Acute CHF. Noted documentation of acute respiratory   failure in H&P and Discharge notes. In order to support the diagnosis of acute   respiratory failure, please include additional clinical indicators in your   documentation, or please document if the diagnosis of acute respiratory   failure has been ruled out after further study. The medical record reflects the following:  Risk Factors: No Low sats nor documentation of accessory muscle usage  Clinical Indicators: Resp 31 on admit, Per ED: \"mildly labored with   conversational dyspnea and bibasilar rales with scattered rhonchi\". Per   cardiology consult: \"Respiratory: Normal excursion and expansion without use   of accessory muscles. Resp Auscultation: Normal breath sounds without   dullness\". O2 sats not less than 92%. Treatment: No O2 usage during stay. Acute Respiratory Failure Clinical Indicators per [employer] Training Guide and   Quick Reference Guide:  pO2 < 60 mmHg or SpO2 (pulse oximetry) < 91% breathing room air  pCO2 > 50 and pH < 7.35  P/F ratio (pO2 / FIO2) < 300  pO2 decrease or pCO2 increase by 10 mmHg from baseline (if known)  Supplemental oxygen of 40% or more  Presence of respiratory distress, tachypnea, dyspnea, shortness of breath,   wheezing  Unable to speak in complete sentences  Use of accessory muscles to breathe  Extreme anxiety and feeling of impending doom  Tripod position  Confusion/altered mental status/obtunded  Options provided:  -- Acute Respiratory Failure as evidenced by (please document), Please   document evidence.   -- Acute Respiratory Failure ruled out after study  -- Other - I will add my own diagnosis  -- Disagree - Not applicable / Not valid  -- Disagree - Clinically unable to determine / Unknown  -- Refer to Clinical Documentation Reviewer    PROVIDER RESPONSE TEXT:    Acute Respiratory Failure has been ruled out after study.     Query created by: Wilmer Johnson on 8/17/2022 8:18 AM      Electronically signed by:  Krystina Mccain MD 8/17/2022 8:58 AM

## 2022-08-18 ENCOUNTER — TELEPHONE (OUTPATIENT)
Dept: CARDIOLOGY CLINIC | Age: 77
End: 2022-08-18

## 2022-08-18 LAB — MITOCHONDRIAL M2 AB, IGG: <0.5 U/ML (ref 0–4)

## 2022-08-18 NOTE — TELEPHONE ENCOUNTER
Patient called office to let Ricardo Early know she is taking ASA 81 mg daily after discharge from hospital. While on the phone she mentioned that her right arm (arm that cath was done 8/12/22) is a light brown color in the area from elbow to hand. Can not describe size. Arm is numb at times. Area is soft. No pain unless she pushes on it. No bleeding or drainage from cath site. No edema. Patient having a hard time describing and very difficult to understand over the phone. Hosp fu is not until Monday 8/22.

## 2022-08-18 NOTE — TELEPHONE ENCOUNTER
BNN will look at wrist today-  Attempted to call Hao back- no answer lvm asking her to call back and ask for me

## 2022-08-22 ENCOUNTER — OFFICE VISIT (OUTPATIENT)
Dept: CARDIOLOGY CLINIC | Age: 77
End: 2022-08-22
Payer: MEDICARE

## 2022-08-22 VITALS
BODY MASS INDEX: 17.51 KG/M2 | HEIGHT: 58 IN | OXYGEN SATURATION: 96 % | HEART RATE: 67 BPM | WEIGHT: 83.4 LBS | DIASTOLIC BLOOD PRESSURE: 64 MMHG | SYSTOLIC BLOOD PRESSURE: 118 MMHG

## 2022-08-22 DIAGNOSIS — I50.22 CHRONIC SYSTOLIC HEART FAILURE (HCC): Primary | ICD-10-CM

## 2022-08-22 DIAGNOSIS — E10.59 TYPE 1 DIABETES MELLITUS WITH OTHER CIRCULATORY COMPLICATION (HCC): ICD-10-CM

## 2022-08-22 DIAGNOSIS — I25.84 CORONARY ARTERY DISEASE DUE TO CALCIFIED CORONARY LESION: ICD-10-CM

## 2022-08-22 DIAGNOSIS — I25.10 CORONARY ARTERY DISEASE DUE TO CALCIFIED CORONARY LESION: ICD-10-CM

## 2022-08-22 DIAGNOSIS — R79.89 ELEVATED LFTS: ICD-10-CM

## 2022-08-22 PROCEDURE — 99496 TRANSJ CARE MGMT HIGH F2F 7D: CPT | Performed by: CLINICAL NURSE SPECIALIST

## 2022-08-22 RX ORDER — LOSARTAN POTASSIUM 25 MG/1
12.5 TABLET ORAL DAILY
Qty: 90 TABLET | Refills: 1 | Status: SHIPPED | OUTPATIENT
Start: 2022-08-22 | End: 2022-09-14

## 2022-08-22 NOTE — PROGRESS NOTES
[address]  Progress Note    Primary Care Doctor:  Debbie Cox MD    Chief Complaint   Patient presents with    Follow-Up from Hospital    Congestive Heart Failure        History of Present Illness:  78 y.o. female with history of diabetes with A1c of 10.1      I had the pleasure of seeing Anand Coronado in follow up for hospitalization 8/10-14/2022 for shortness of breath, acute systolic heart failure with elevated LFTs, bilateral pleural effusions, mildly anemia, LHC with PCI to LAD. She is ambulatory and with her , Julianna Chavira. [facility] house but have not come back. She has several dietary questions and did not get enough education while in the hospital.  Her losartan was stopped in the hospital due to soft BP. She is taking all her other medications. No chest pain, palpitations, shortness of breath or edema. Her biggest complaint is numbness in her legs and very small hematoma on right wrist.  Phone call 8/15/2022    Past Medical History:   has a past medical history of Diabetes mellitus ([address] and Hypothyroid. Surgical History:   has no past surgical history on file. Social History:   reports that she does not drink alcohol. Family History:   No family history on file. Home Medications:  Prior to Admission medications    Medication Sig Start Date End Date Taking? Authorizing Provider   losartan (COZAAR) 25 MG tablet Take 0.5 tablets by mouth daily 8/22/22  Yes Guillermina Ndiaye APRN - CNS   aspirin EC 81 MG EC tablet Take 1 tablet by mouth in the morning. 8/15/22  Yes Delaney Jesus APRN - CNP   rosuvastatin (CRESTOR) 20 MG tablet Take 1 tablet by mouth nightly 8/14/22  Yes Leoncio Whitney MD   clopidogrel (PLAVIX) 75 MG tablet Take 1 tablet by mouth in the morning. 8/15/22  Yes Leoncio Whitney MD   empagliflozin (JARDIANCE) 25 MG tablet Take 1 tablet by mouth in the morning.  8/14/22  Yes Leoncio Whitney MD   metoprolol succinate (TOPROL XL) 25 MG extended release tablet Take 0.5 tablets by mouth nightly 8/14/22  Yes THANG Diaz - CNP   levothyroxine (SYNTHROID) 50 MCG tablet Take 50 mcg by mouth in the morning. Yes Historical Provider, MD   sitaGLIPtan-metFORMIN (JANUMET)  MG per tablet Take 0.5 tablets by mouth 2 times daily (with meals)   Yes Historical Provider, MD   glipiZIDE (GLUCOTROL) 5 MG tablet Take 5 mg by mouth in the morning and 5 mg in the evening. Take before meals. Yes Historical Provider, MD   therapeutic multivitamin-minerals (THERAGRAN-M) tablet Take 1 tablet by mouth daily. Yes Historical Provider, MD   carvedilol (COREG) 3.125 MG tablet Take 1 tablet by mouth in the morning and 1 tablet in the evening. Take with meals. 8/14/22 8/14/22  Arron Arias MD        Allergies:  Codeine     Review of Systems:   Constitutional: there has been no unanticipated weight loss. There's been no change in energy level, sleep pattern, or activity level. Eyes: No visual changes or diplopia. No scleral icterus. ENT: No Headaches, hearing loss or vertigo. No mouth sores or sore throat. Cardiovascular: Reviewed in HPI  Respiratory: No cough or wheezing, no sputum production. No hematemesis. Gastrointestinal: No abdominal pain, appetite loss, blood in stools. No change in bowel or bladder habits. Genitourinary: No dysuria, trouble voiding, or hematuria. Musculoskeletal:  No gait disturbance, weakness or joint complaints. Integumentary: No rash or pruritis. Neurological: No headache, diplopia, change in muscle strength, numbness or tingling. No change in gait, balance, coordination, mood, affect, memory, mentation, behavior. Psychiatric: No anxiety, no depression. Endocrine: No malaise, fatigue or temperature intolerance. No excessive thirst, fluid intake, or urination. No tremor. Hematologic/Lymphatic: No abnormal bruising or bleeding, blood clots or swollen lymph nodes.   Allergic/Immunologic: No K 4.0 08/14/2022 06:17 AM    K 4.3 08/13/2022 04:46 AM    K 4.1 08/12/2022 04:18 AM    K 4.6 08/10/2022 06:50 PM     08/14/2022 06:17 AM    CL 98 08/13/2022 04:46 AM    CL 99 08/12/2022 04:18 AM    CO2 22 08/14/2022 06:17 AM    CO2 21 08/13/2022 04:46 AM    CO2 24 08/12/2022 04:18 AM    BUN 29 08/14/2022 06:17 AM    BUN 35 08/13/2022 04:46 AM    BUN 36 08/12/2022 04:18 AM    CREATININE 1.1 08/14/2022 06:17 AM    CREATININE 1.3 08/13/2022 04:46 AM    CREATININE 1.4 08/12/2022 04:18 AM     BNP:   Lab Results   Component Value Date/Time    PROBNP 1,630 08/14/2022 06:17 AM    PROBNP 1,807 08/13/2022 04:46 AM    PROBNP 5,434 08/12/2022 04:18 AM     Cardiac Imaging:    Cardiac Cath PCI: 8/10/2022  Dr Rosmery Vasquez  Anatomy:   LM-nml   LAD-mid 95%  Cx-nml  OM- nml  RCA-dom nml  RPDA- nml  LVEDP- 1     Intervention  ~Successful PCI to LAD with 2.25x18 ELIAS. Excellent Result. Contrast: 20  Flouro Time: 3.7  Access: R radial a     Impression  ~Coronary Angiography w/ severe single vessel CAD  ~Successful complex angioplasty and stenting of LAD    Recommendation  ~Aggressive medical treatment and risk factor modification  ~1. Post cath IVF. Bedrest.   2. Recommend beta blocker, ace/arb, high potency statin, aspirin and plavix for 6-12 months. 3. Referral to outpatient cardiac rehab phase II will be deferred until patient follow-up in office and then determine patient safety and appropriateness to proceed  4. Patient has been advised on the importance of regular exercise of at least 20-30 minutes daily. 5. Patient counseled about and offered assistance for smoking cessation   6. Follow up in 2 weeks with cardiology    Echo 8/11/2022  Summary   -Overall left ventricular systolic function appears severely reduced.   -Ejection fraction is visually estimated to be 15-20%. -There is severe global hypokinesis with varying regional wall motion   abnormalities.    -Grade II diastolic dysfunction with elevated LV filling pressures. e/e' =   16.8.   -Aortic valve appears sclerotic but opens adequately.   -Thickened mitral valve leaflets. -Mild mitral regurgitation.   -Trivial tricuspid regurgitation.   -Trivial pericardial effusion. Assessment:    1. Chronic systolic heart failure (HCC) on bb, sglt2   2. Coronary artery disease due to calcified coronary lesion    3.  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 Elevated LFTs        Plan:   Patient Instructions   Blood work today and weekly  [facility]  Ge Bird to do some Heart failure education today  Start wxfumchl19.5 mg (half of 25 mg pill) once a day  Cardiac rehab referral  RTO in 2-3 weeks  <64 ounces fluid a day and <2000 mg sodium    NYHA III    I appreciate the opportunity of cooperating in the care of this individual.    THANG Hamilton - CNS, CNS, 8/22/2022, 2:28 PM

## 2022-08-22 NOTE — PATIENT INSTRUCTIONS
Blood work today and weekly  [facility] to come tomorrow  Adrianna Torre to do some Heart failure education today  Start xayomnyo48.5 mg (half of 25 mg pill) once a day  Cardiac rehab referral  RTO in 2-3 weeks  <64 ounces fluid a day and <2000 mg sodium

## 2022-08-23 ENCOUNTER — TELEPHONE (OUTPATIENT)
Dept: CARDIOLOGY CLINIC | Age: 77
End: 2022-08-23

## 2022-08-23 NOTE — TELEPHONE ENCOUNTER
Morgan Stoddard, son called to request  the lab order to be fax to: [id number] [address] - Fax:  [phone]. Per Morgan Stoddard he is requesting a call from the office to discuss the lab.   Please advise

## 2022-08-23 NOTE — TELEPHONE ENCOUNTER
THANG Kinsey - CNS, CNS, 8/22/2022, 2:28 PM                 Instructions      Return 2-3 weeks. Blood work today and weekly  [facility] come tomorrow  Yoel to do some Heart failure education today  Start pcwoxras37.5 mg (half of 25 mg pill) once a day  Cardiac rehab referral  RTO in 2-3 weeks  <64 ounces fluid a day and <2000 mg sodium        Tried to reach patient's son can not leave a message no voicemail picked up, will fax over the lab orders to [facility] today.

## 2022-08-24 ENCOUNTER — HOSPITAL ENCOUNTER (OUTPATIENT)
Age: 77
Discharge: HOME | End: 2022-08-24
Payer: MEDICARE

## 2022-08-24 DIAGNOSIS — I50.22 CHRONIC SYSTOLIC HEART FAILURE (HCC): ICD-10-CM

## 2022-08-24 LAB
ALBUMIN SERPL-MCNC: 4.1 G/DL (ref 3.4–5)
ALP BLD-CCNC: 96 U/L (ref 40–129)
ALT SERPL-CCNC: 24 U/L (ref 10–40)
ANION GAP SERPL CALCULATED.3IONS-SCNC: 12 MMOL/L (ref 3–16)
AST SERPL-CCNC: 24 U/L (ref 15–37)
BILIRUB SERPL-MCNC: 0.4 MG/DL (ref 0–1)
BILIRUBIN DIRECT: <0.2 MG/DL (ref 0–0.3)
BILIRUBIN, INDIRECT: NORMAL MG/DL (ref 0–1)
BUN BLDV-MCNC: 24 MG/DL (ref 7–20)
CALCIUM SERPL-MCNC: 9 MG/DL (ref 8.3–10.6)
CHLORIDE BLD-SCNC: 102 MMOL/L (ref 99–110)
CO2: 24 MMOL/L (ref 21–32)
CREAT SERPL-MCNC: 1.2 MG/DL (ref 0.6–1.2)
GFR AFRICAN AMERICAN: 53
GFR NON-AFRICAN AMERICAN: 44
GLUCOSE BLD-MCNC: 241 MG/DL (ref 70–99)
POTASSIUM SERPL-SCNC: 4.7 MMOL/L (ref 3.5–5.1)
PRO-BNP: 4199 PG/ML (ref 0–449)
SODIUM BLD-SCNC: 138 MMOL/L (ref 136–145)
TOTAL PROTEIN: 7 G/DL (ref 6.4–8.2)

## 2022-08-24 PROCEDURE — 80076 HEPATIC FUNCTION PANEL: CPT

## 2022-08-24 PROCEDURE — 83880 ASSAY OF NATRIURETIC PEPTIDE: CPT

## 2022-08-24 PROCEDURE — 80048 BASIC METABOLIC PNL TOTAL CA: CPT

## 2022-08-24 PROCEDURE — 36415 COLL VENOUS BLD VENIPUNCTURE: CPT

## 2022-08-25 ENCOUNTER — TELEPHONE (OUTPATIENT)
Dept: CARDIOLOGY CLINIC | Age: 77
End: 2022-08-25

## 2022-08-25 NOTE — TELEPHONE ENCOUNTER
Tried to reach patient, [facility] for her to return our call. Spoke with patient she was 80 pds now she is 78 pds. She did have labs drawn 08/24/2022  No swelling, no sob,  feeling tired, eating salty foods and not drinking a whole lot of fluids. She did start Losartan.

## 2022-08-25 NOTE — TELEPHONE ENCOUNTER
----- Message from THANG Lozada - CNS sent at 8/25/2022  9:08 AM EDT -----  Call and see how she is feeling  Weights? ? as her fluid level is elevated  I started losartan at OV and did she do this  You will need to speak to her son as well  thanks

## 2022-08-29 NOTE — TELEPHONE ENCOUNTER
Tried to reach patient's son [facility] about getting Blood sugars under control.

## 2022-09-06 ENCOUNTER — HOSPITAL ENCOUNTER (OUTPATIENT)
Age: 77
Setting detail: SPECIMEN
Discharge: HOME | End: 2022-09-06
Payer: MEDICARE

## 2022-09-06 LAB
ALBUMIN SERPL-MCNC: 3.9 G/DL (ref 3.4–5)
ALP BLD-CCNC: 77 U/L (ref 40–129)
ALT SERPL-CCNC: 18 U/L (ref 10–40)
ANION GAP SERPL CALCULATED.3IONS-SCNC: 11 MMOL/L (ref 3–16)
AST SERPL-CCNC: 23 U/L (ref 15–37)
BILIRUB SERPL-MCNC: 0.3 MG/DL (ref 0–1)
BILIRUBIN DIRECT: <0.2 MG/DL (ref 0–0.3)
BILIRUBIN, INDIRECT: NORMAL MG/DL (ref 0–1)
BUN BLDV-MCNC: 23 MG/DL (ref 7–20)
CALCIUM SERPL-MCNC: 9.6 MG/DL (ref 8.3–10.6)
CHLORIDE BLD-SCNC: 107 MMOL/L (ref 99–110)
CO2: 23 MMOL/L (ref 21–32)
CREAT SERPL-MCNC: 0.9 MG/DL (ref 0.6–1.2)
GFR AFRICAN AMERICAN: >60
GFR NON-AFRICAN AMERICAN: >60
GLUCOSE BLD-MCNC: 153 MG/DL (ref 70–99)
POTASSIUM SERPL-SCNC: 4.5 MMOL/L (ref 3.5–5.1)
PRO-BNP: 4780 PG/ML (ref 0–449)
SODIUM BLD-SCNC: 141 MMOL/L (ref 136–145)
TOTAL PROTEIN: 6.8 G/DL (ref 6.4–8.2)

## 2022-09-06 PROCEDURE — 36415 COLL VENOUS BLD VENIPUNCTURE: CPT

## 2022-09-06 PROCEDURE — 80048 BASIC METABOLIC PNL TOTAL CA: CPT

## 2022-09-06 PROCEDURE — 83880 ASSAY OF NATRIURETIC PEPTIDE: CPT

## 2022-09-06 PROCEDURE — 80076 HEPATIC FUNCTION PANEL: CPT

## 2022-09-07 ENCOUNTER — TELEPHONE (OUTPATIENT)
Dept: CARDIOLOGY CLINIC | Age: 77
End: 2022-09-07

## 2022-09-07 NOTE — TELEPHONE ENCOUNTER
----- Message from THANG Mock CNP sent at 9/7/2022  9:08 AM EDT -----  Her fluid number is even higher, how is her wt and is she short of breath at all? We may need to start low dose diuretic before OV with RG next week.  Isak Song

## 2022-09-07 NOTE — TELEPHONE ENCOUNTER
Attempted call to pt. Unable to leave message on mobile due to it being full. Only able to put in office phone number in her Mobile number. No availability to leave message on home number.

## 2022-09-08 ENCOUNTER — TELEPHONE (OUTPATIENT)
Dept: CARDIOLOGY CLINIC | Age: 77
End: 2022-09-08

## 2022-09-08 NOTE — TELEPHONE ENCOUNTER
Lisa Somers called stating that the Pt is requesting a . Lisa Somers is needing an order. Please fax to: [id number] -  Fax:  [phone].   Please advise

## 2022-09-14 ENCOUNTER — OFFICE VISIT (OUTPATIENT)
Dept: CARDIOLOGY CLINIC | Age: 77
End: 2022-09-14
Payer: MEDICARE

## 2022-09-14 VITALS
BODY MASS INDEX: 17.21 KG/M2 | WEIGHT: 82 LBS | DIASTOLIC BLOOD PRESSURE: 60 MMHG | SYSTOLIC BLOOD PRESSURE: 120 MMHG | HEART RATE: 73 BPM | OXYGEN SATURATION: 99 % | HEIGHT: 58 IN

## 2022-09-14 DIAGNOSIS — I25.84 CORONARY ARTERY DISEASE DUE TO CALCIFIED CORONARY LESION: ICD-10-CM

## 2022-09-14 DIAGNOSIS — I50.22 CHRONIC SYSTOLIC HEART FAILURE (HCC): Primary | ICD-10-CM

## 2022-09-14 DIAGNOSIS — E10.59 TYPE 1 DIABETES MELLITUS WITH OTHER CIRCULATORY COMPLICATION (HCC): ICD-10-CM

## 2022-09-14 DIAGNOSIS — I25.10 CORONARY ARTERY DISEASE DUE TO CALCIFIED CORONARY LESION: ICD-10-CM

## 2022-09-14 PROCEDURE — 3046F HEMOGLOBIN A1C LEVEL >9.0%: CPT | Performed by: CLINICAL NURSE SPECIALIST

## 2022-09-14 PROCEDURE — 99214 OFFICE O/P EST MOD 30 MIN: CPT | Performed by: CLINICAL NURSE SPECIALIST

## 2022-09-14 PROCEDURE — 1123F ACP DISCUSS/DSCN MKR DOCD: CPT | Performed by: CLINICAL NURSE SPECIALIST

## 2022-09-14 RX ORDER — LOSARTAN POTASSIUM 25 MG/1
25 TABLET ORAL DAILY
Qty: 90 TABLET | Refills: 1
Start: 2022-09-14 | End: 2022-10-05 | Stop reason: ALTCHOICE

## 2022-09-14 NOTE — PROGRESS NOTES
[address]  Progress Note    Primary Care Doctor:  Dilma Lemus MD    Chief Complaint   Patient presents with    Follow-up    Congestive Heart Failure        History of Present Illness:  78 y.o. female with history of diabetes with S5M of 00.0, systolic heart failure   8/76-06/0495 for shortness of breath, acute systolic heart failure with elevated LFTs, bilateral pleural effusions, mildly anemia, LHC with PCI to LAD. I had the pleasure of seeing Bryn Bermudez in follow up for systolic heart failure. She is ambulatory and with her , Fouzia Varma. [facility] with [facility] (just the nurse). She had labs done by her PCP (not available at this time). She continues to feel weak but no increased shortness of breath, palpitations, lightheadedness or edema. She is taking all her medications and is tolerating the losartan. Past Medical History:   has a past medical history of Diabetes mellitus ([address] and Hypothyroid. Surgical History:   has no past surgical history on file. Social History:   reports that she does not drink alcohol. Family History:   No family history on file. Home Medications:  Prior to Admission medications    Medication Sig Start Date End Date Taking? Authorizing Provider   losartan (COZAAR) 25 MG tablet Take 1 tablet by mouth daily 9/14/22  Yes THANG John   aspirin EC 81 MG EC tablet Take 1 tablet by mouth in the morning. 8/15/22  Yes THANG Marie CNP   rosuvastatin (CRESTOR) 20 MG tablet Take 1 tablet by mouth nightly 8/14/22  Yes Ricardo Julio MD   clopidogrel (PLAVIX) 75 MG tablet Take 1 tablet by mouth in the morning. 8/15/22  Yes Ricardo Julio MD   empagliflozin (JARDIANCE) 25 MG tablet Take 1 tablet by mouth in the morning.  8/14/22  Yes Ricardo Julio MD   metoprolol succinate (TOPROL XL) 25 MG extended release tablet Take 0.5 tablets by mouth nightly 8/14/22  Yes THANG Marie CNP   levothyroxine (SYNTHROID) 50 MCG tablet Take 50 mcg by mouth in the morning. Yes Historical Provider, MD   sitaGLIPtan-metFORMIN (JANUMET)  MG per tablet Take 1 tablet by mouth 2 times daily (with meals)   Yes Historical Provider, MD   glipiZIDE (GLUCOTROL) 5 MG tablet Take 5 mg by mouth in the morning and 5 mg in the evening. Take before meals. Yes Historical Provider, MD   therapeutic multivitamin-minerals (THERAGRAN-M) tablet Take 1 tablet by mouth daily. Yes Historical Provider, MD   carvedilol (COREG) 3.125 MG tablet Take 1 tablet by mouth in the morning and 1 tablet in the evening. Take with meals. 8/14/22 8/14/22  Cori Rosen MD        Allergies:  Codeine     Review of Systems:   Constitutional: there has been no unanticipated weight loss. There's been no change in energy level, sleep pattern, or activity level. Eyes: No visual changes or diplopia. No scleral icterus. ENT: No Headaches, hearing loss or vertigo. No mouth sores or sore throat. Cardiovascular: Reviewed in HPI  Respiratory: No cough or wheezing, no sputum production. No hematemesis. Gastrointestinal: No abdominal pain, appetite loss, blood in stools. No change in bowel or bladder habits. Genitourinary: No dysuria, trouble voiding, or hematuria. Musculoskeletal:  No gait disturbance, weakness or joint complaints. Integumentary: No rash or pruritis. Neurological: No headache, diplopia, change in muscle strength, numbness or tingling. No change in gait, balance, coordination, mood, affect, memory, mentation, behavior. Psychiatric: No anxiety, no depression. Endocrine: No malaise, fatigue or temperature intolerance. No excessive thirst, fluid intake, or urination. No tremor. Hematologic/Lymphatic: No abnormal bruising or bleeding, blood clots or swollen lymph nodes. Allergic/Immunologic: No nasal congestion or hives.     Physical Examination:    Vitals:    09/14/22 1325   BP: 120/60   Site: Right Upper Arm   Position: Sitting Cuff Size: Medium Adult   Pulse: 73   SpO2: 99%   Weight: 82 lb (37.2 kg)   Height: 4' 10\" (1.473 m)        Constitutional and General Appearance: Warm and dry, no apparent distress, normal coloration  HEENT:  Normocephalic, atraumatic, very thin  Respiratory:  Normal excursion and expansion without use of accessory muscles  Resp Auscultation: Normal breath sounds without dullness  Cardiovascular: The apical impulses not displaced  Heart tones are crisp and normal  JVP normal cm H2O  Regular rate and rhythm  Peripheral pulses are symmetrical and full  There is no clubbing, cyanosis of the extremities.   no edema  Pedal Pulses: 2+ and equal   Abdomen:  No masses or tenderness  Liver/Spleen: No Abnormalities Noted  Neurological/Psychiatric:  Alert and oriented in all spheres  Moves all extremities well  Exhibits normal gait balance and coordination  No abnormalities of mood, affect, memory, mentation, or behavior are noted    Lab Data:    CBC:   Lab Results   Component Value Date/Time    WBC 7.3 08/14/2022 06:17 AM    WBC 8.3 08/13/2022 04:46 AM    WBC 8.1 08/12/2022 04:19 AM    RBC 3.61 08/14/2022 06:17 AM    RBC 3.68 08/13/2022 04:46 AM    RBC 3.64 08/12/2022 04:19 AM    HGB 11.2 08/14/2022 06:17 AM    HGB 11.4 08/13/2022 04:46 AM    HGB 11.4 08/12/2022 04:19 AM    HCT 33.0 08/14/2022 06:17 AM    HCT 34.3 08/13/2022 04:46 AM    HCT 32.7 08/12/2022 04:19 AM    MCV 91.3 08/14/2022 06:17 AM    MCV 93.0 08/13/2022 04:46 AM    MCV 89.9 08/12/2022 04:19 AM    RDW 12.8 08/14/2022 06:17 AM    RDW 13.1 08/13/2022 04:46 AM    RDW 12.9 08/12/2022 04:19 AM     08/14/2022 06:17 AM     08/13/2022 04:46 AM     08/12/2022 04:19 AM     BMP:  Lab Results   Component Value Date/Time     09/06/2022 10:30 AM     08/24/2022 04:20 PM     08/14/2022 06:17 AM    K 4.5 09/06/2022 10:30 AM    K 4.7 08/24/2022 04:20 PM    K 4.0 08/14/2022 06:17 AM    K 4.6 08/10/2022 06:50 PM     09/06/2022 10:30 AM     08/24/2022 04:20 PM     08/14/2022 06:17 AM    CO2 23 09/06/2022 10:30 AM    CO2 24 08/24/2022 04:20 PM    CO2 22 08/14/2022 06:17 AM    BUN 23 09/06/2022 10:30 AM    BUN 24 08/24/2022 04:20 PM    BUN 29 08/14/2022 06:17 AM    CREATININE 0.9 09/06/2022 10:30 AM    CREATININE 1.2 08/24/2022 04:20 PM    CREATININE 1.1 08/14/2022 06:17 AM     BNP:   Lab Results   Component Value Date/Time    PROBNP 4,780 09/06/2022 10:30 AM    PROBNP 4,199 08/24/2022 04:20 PM    PROBNP 1,630 08/14/2022 06:17 AM     Cardiac Imaging:    Cardiac Cath PCI: 8/10/2022  Dr Jacek Gupta  Anatomy:   LM-nml   LAD-mid 95%  Cx-nml  OM- nml  RCA-dom nml  RPDA- nml  LVEDP- 1     Intervention  ~Successful PCI to LAD with 2.25x18 ELIAS. Excellent Result. Contrast: 20  Flouro Time: 3.7  Access: R radial a     Impression  ~Coronary Angiography w/ severe single vessel CAD  ~Successful complex angioplasty and stenting of LAD    Recommendation  ~Aggressive medical treatment and risk factor modification  ~1. Post cath IVF. Bedrest.   2. Recommend beta blocker, ace/arb, high potency statin, aspirin and plavix for 6-12 months. 3. Referral to outpatient cardiac rehab phase II will be deferred until patient follow-up in office and then determine patient safety and appropriateness to proceed  4. Patient has been advised on the importance of regular exercise of at least 20-30 minutes daily. 5. Patient counseled about and offered assistance for smoking cessation   6. Follow up in 2 weeks with cardiology    Echo 8/11/2022  Summary   -Overall left ventricular systolic function appears severely reduced.   -Ejection fraction is visually estimated to be 15-20%. -There is severe global hypokinesis with varying regional wall motion   abnormalities. -Grade II diastolic dysfunction with elevated LV filling pressures. e/e' =   16.8.   -Aortic valve appears sclerotic but opens adequately.   -Thickened mitral valve leaflets.    -Mild mitral regurgitation.   -Trivial tricuspid regurgitation.   -Trivial pericardial effusion. Assessment:    1. Chronic systolic heart failure ([address] on arb, bb and sglt2   2. Coronary artery disease due to calcified coronary lesion    3.  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)        Plan:   Patient Instructions   Call cardiac rehab [phone] you need to start this  Increase losartan to 25 mg once a day  Blood today work today and will have them faxed to our office  Continue current medications  RTO in 2 weeks    Consider changing losartan to entresto at follow up visit and then add aldactone    NYHA III    I appreciate the opportunity of cooperating in the care of this individual.    THANG Sosa - CNS, CNS, 9/14/2022, 4:31 PM

## 2022-09-14 NOTE — TELEPHONE ENCOUNTER
Prescription refill    Last OV:seen in hospital    Last Refill:08/15/2022    Labs:09/06/2022    Future Appt: none scheduled patient not on the recall list

## 2022-09-14 NOTE — PATIENT INSTRUCTIONS
Call cardiac rehab [phone] you need to start this  Increase losartan to 25 mg once a day  Blood today work today and will have them faxed to our office  Continue current medications  RTO in 2 weeks

## 2022-09-15 NOTE — TELEPHONE ENCOUNTER
9/14/22 she was given plavix and crestor 30 day supply with 3 refills. She should not be due for them at this time. Ov with [facility] scheduled.

## 2022-09-16 RX ORDER — CLOPIDOGREL BISULFATE 75 MG/1
75 TABLET ORAL DAILY
Qty: 30 TABLET | Refills: 5 | Status: SHIPPED | OUTPATIENT
Start: 2022-09-16 | End: 2022-10-27 | Stop reason: SDUPTHER

## 2022-09-16 RX ORDER — ROSUVASTATIN CALCIUM 20 MG/1
20 TABLET, COATED ORAL NIGHTLY
Qty: 30 TABLET | Refills: 5 | Status: SHIPPED | OUTPATIENT
Start: 2022-09-16 | End: 2022-10-27 | Stop reason: SDUPTHER

## 2022-09-20 ENCOUNTER — TELEPHONE (OUTPATIENT)
Dept: CARDIOLOGY CLINIC | Age: 77
End: 2022-09-20

## 2022-09-20 NOTE — TELEPHONE ENCOUNTER
Spoke to patient her PCP put her on insulin due to high blood sugars. Nurse could not draw blood last week or this week. She went to her PCP and they connor her labs did we get the results. I called her PCP office  they will fax over results.

## 2022-09-20 NOTE — TELEPHONE ENCOUNTER
Quinten Trotter is calling to get an order for a , Dietician. Please fax the order to:  [phone]. Per Pt she is requesting that [id number] [place] [address] call her, Pt have a few questions.   Please advise

## 2022-09-20 NOTE — TELEPHONE ENCOUNTER
Labs from [address] [place] [address] 9/14/2022  A1c 9.0 creat 1.04 potassium 5.2  No bnp    Continue plan with diabetes per PCP

## 2022-09-27 NOTE — TELEPHONE ENCOUNTER
Spoke to patient she has a blood sugar machine and is monitoring her blood sugars she is taking 16 units of insulin daily. She is asking for diabetic education gave her Dr. Billy Bach phone number she will call and ask them.

## 2022-10-05 ENCOUNTER — OFFICE VISIT (OUTPATIENT)
Dept: CARDIOLOGY CLINIC | Age: 77
End: 2022-10-05
Payer: MEDICARE

## 2022-10-05 ENCOUNTER — HOSPITAL ENCOUNTER (OUTPATIENT)
Age: 77
Discharge: HOME | End: 2022-10-05
Payer: MEDICARE

## 2022-10-05 VITALS
WEIGHT: 85.8 LBS | BODY MASS INDEX: 18.01 KG/M2 | DIASTOLIC BLOOD PRESSURE: 64 MMHG | HEIGHT: 58 IN | SYSTOLIC BLOOD PRESSURE: 140 MMHG | HEART RATE: 70 BPM | OXYGEN SATURATION: 99 %

## 2022-10-05 DIAGNOSIS — I25.10 CORONARY ARTERY DISEASE DUE TO CALCIFIED CORONARY LESION: ICD-10-CM

## 2022-10-05 DIAGNOSIS — I50.22 CHRONIC SYSTOLIC HEART FAILURE (HCC): ICD-10-CM

## 2022-10-05 DIAGNOSIS — I25.84 CORONARY ARTERY DISEASE DUE TO CALCIFIED CORONARY LESION: ICD-10-CM

## 2022-10-05 DIAGNOSIS — E10.59 TYPE 1 DIABETES MELLITUS WITH OTHER CIRCULATORY COMPLICATION (HCC): ICD-10-CM

## 2022-10-05 DIAGNOSIS — I50.22 CHRONIC SYSTOLIC HEART FAILURE (HCC): Primary | ICD-10-CM

## 2022-10-05 LAB
ANION GAP SERPL CALCULATED.3IONS-SCNC: 10 MMOL/L (ref 3–16)
BUN BLDV-MCNC: 24 MG/DL (ref 7–20)
CALCIUM SERPL-MCNC: 9.6 MG/DL (ref 8.3–10.6)
CHLORIDE BLD-SCNC: 107 MMOL/L (ref 99–110)
CO2: 22 MMOL/L (ref 21–32)
CREAT SERPL-MCNC: 0.8 MG/DL (ref 0.6–1.2)
GFR AFRICAN AMERICAN: >60
GFR NON-AFRICAN AMERICAN: >60
GLUCOSE BLD-MCNC: 100 MG/DL (ref 70–99)
POTASSIUM SERPL-SCNC: 5.2 MMOL/L (ref 3.5–5.1)
PRO-BNP: 3724 PG/ML (ref 0–449)
SODIUM BLD-SCNC: 139 MMOL/L (ref 136–145)

## 2022-10-05 PROCEDURE — 83880 ASSAY OF NATRIURETIC PEPTIDE: CPT

## 2022-10-05 PROCEDURE — 80048 BASIC METABOLIC PNL TOTAL CA: CPT

## 2022-10-05 PROCEDURE — 1123F ACP DISCUSS/DSCN MKR DOCD: CPT | Performed by: CLINICAL NURSE SPECIALIST

## 2022-10-05 PROCEDURE — 99214 OFFICE O/P EST MOD 30 MIN: CPT | Performed by: CLINICAL NURSE SPECIALIST

## 2022-10-05 PROCEDURE — 36415 COLL VENOUS BLD VENIPUNCTURE: CPT

## 2022-10-05 PROCEDURE — 3046F HEMOGLOBIN A1C LEVEL >9.0%: CPT | Performed by: CLINICAL NURSE SPECIALIST

## 2022-10-05 RX ORDER — INSULIN GLARGINE AND LIXISENATIDE 100; 33 U/ML; UG/ML
16 INJECTION, SOLUTION SUBCUTANEOUS EVERY MORNING
COMMUNITY
Start: 2022-09-28

## 2022-10-05 NOTE — PATIENT INSTRUCTIONS
Stop losartan  Start entresto 24-26 mg half tablet twice a day  Check blood work today  Call primary care about blood sugars and timing of insulin  RTO in 1 month

## 2022-10-07 ENCOUNTER — TELEPHONE (OUTPATIENT)
Dept: CARDIOLOGY CLINIC | Age: 77
End: 2022-10-07

## 2022-10-07 NOTE — TELEPHONE ENCOUNTER
----- Message from THANG Braun - CNS sent at 10/6/2022  2:06 PM EDT -----  Potassium sample hemolyzed  Labs are good  Fluid level continues to improve  Ask her to do blood work again in 2 weeks  thanks

## 2022-10-07 NOTE — TELEPHONE ENCOUNTER
Tried to reach patient, [facility] for patient to return our call with any questions or concerns.

## 2022-10-10 ENCOUNTER — TELEPHONE (OUTPATIENT)
Dept: CARDIOLOGY CLINIC | Age: 77
End: 2022-10-10

## 2022-10-10 NOTE — TELEPHONE ENCOUNTER
Spoke to patient she has lost her cell phone so that number is not working at this time. She will get lab work done in 2 weeks. She is asking if she can drive short distances since her  can not drive.   Please advise

## 2022-10-10 NOTE — TELEPHONE ENCOUNTER
[employer] is asking [facility]  to give meal guidelines for pt's diabetic diet. Please call to advise.

## 2022-10-12 ENCOUNTER — TELEPHONE (OUTPATIENT)
Dept: OTHER | Age: 77
End: 2022-10-12

## 2022-10-14 NOTE — TELEPHONE ENCOUNTER
Spoke to patient she has just ended home care PT and now she wants to start cardiac rehab. No one is answering the phone. She has called several times and left a message. Patient has lost her cell phone please don't call the cell. Cardiac Rehabilitation - [address] [place] [address]  Called cardiac rehab and left a detailed message.

## 2022-10-19 ENCOUNTER — HOSPITAL ENCOUNTER (OUTPATIENT)
Dept: CARDIAC REHAB | Age: 77
Setting detail: THERAPIES SERIES
Discharge: HOME | End: 2022-10-19
Payer: MEDICARE

## 2022-10-19 VITALS
OXYGEN SATURATION: 100 % | HEART RATE: 63 BPM | BODY MASS INDEX: 18.22 KG/M2 | HEIGHT: 58 IN | DIASTOLIC BLOOD PRESSURE: 74 MMHG | SYSTOLIC BLOOD PRESSURE: 122 MMHG | WEIGHT: 86.8 LBS | RESPIRATION RATE: 12 BRPM

## 2022-10-19 PROCEDURE — 93798 PHYS/QHP OP CAR RHAB W/ECG: CPT

## 2022-10-19 ASSESSMENT — PATIENT HEALTH QUESTIONNAIRE - PHQ9
5. POOR APPETITE OR OVEREATING: 3
1. LITTLE INTEREST OR PLEASURE IN DOING THINGS: 1
10. IF YOU CHECKED OFF ANY PROBLEMS, HOW DIFFICULT HAVE THESE PROBLEMS MADE IT FOR YOU TO DO YOUR WORK, TAKE CARE OF THINGS AT HOME, OR GET ALONG WITH OTHER PEOPLE: 1
SUM OF ALL RESPONSES TO PHQ QUESTIONS 1-9: 8
SUM OF ALL RESPONSES TO PHQ QUESTIONS 1-9: 8
7. TROUBLE CONCENTRATING ON THINGS, SUCH AS READING THE NEWSPAPER OR WATCHING TELEVISION: 0
3. TROUBLE FALLING OR STAYING ASLEEP: 1
2. FEELING DOWN, DEPRESSED OR HOPELESS: 1
SUM OF ALL RESPONSES TO PHQ QUESTIONS 1-9: 8
4. FEELING TIRED OR HAVING LITTLE ENERGY: 2
9. THOUGHTS THAT YOU WOULD BE BETTER OFF DEAD, OR OF HURTING YOURSELF: 0
8. MOVING OR SPEAKING SO SLOWLY THAT OTHER PEOPLE COULD HAVE NOTICED. OR THE OPPOSITE, BEING SO FIGETY OR RESTLESS THAT YOU HAVE BEEN MOVING AROUND A LOT MORE THAN USUAL: 0
SUM OF ALL RESPONSES TO PHQ9 QUESTIONS 1 & 2: 2
6. FEELING BAD ABOUT YOURSELF - OR THAT YOU ARE A FAILURE OR HAVE LET YOURSELF OR YOUR FAMILY DOWN: 0
SUM OF ALL RESPONSES TO PHQ QUESTIONS 1-9: 8

## 2022-10-19 ASSESSMENT — EJECTION FRACTION: EF_VALUE: 15

## 2022-10-19 NOTE — CARDIO/PULMONARY
[facility] Cardiac Rehabilitation Initial Evaluation    Matthieu Tompkins       1945     [id number]    Share medical information:  Yes son Yvonne Casiano [phone]    Cardiac History    EF:  15-20%  CHF - last admission:  8/10/2022    Physical Assessment     General Appearance   Height:  4' 10\" (147.3 cm)  Weight:  86 lb 12.8 oz (39.4 kg)   BMI:  18.2  Skin color:         Cardiovascular Assessment  BP Sittin/74  Sittin/60 (RA)  Standin/70 (LA)  Heart rate:   63 Apical, Monitor   Heart sounds:   Regular S1, S2    Respiratory Assessment  Resp rate: 12                  SpO2:  100 %  Quality/Effort:      Sleep Apnea:  No  CPAP  No  Oxygen  No     Sleeping Habits:   has trouble falling asleep and awakes due to low BS between 4-6 am   Edema:  No      Orthopedic/Exercise Limitations:  Yes Left shoulder to bicep decreased ROM    Pain:   Do you have pain?:  yes - left shoulder area at times       Fall Risk Assessment  Outpatient population: Patient seen multiple times at site for continuous  treatment (Series)  History of falling with or without injury: No  Use of ambulatory aid: No  Difficulty walking/impaired gait: No  Numbness in feet: Yes  Vision changes: Yes (needs cataract surgery for both eyes)  Dizziness: No  Shortness of breath: No  Current medications include but not limited to: Anticoagulant, ACE, ARB, Beta  Blocker     Outpatient fall risk intervention strategies: None of these strategies apply    Abuse / Neglect  Physical/behavioral signs of abuse/neglect   No    Do you feel safe at home   Yes    Advanced Directives  Patient has Advanced Directives:  No  Patient given Advanced Directive pack:  Yes    Vaccinations  Influenza (annual):  Yes  Pneumonia:  No  Pt here for first session of Cardiac Rehab. Reviewed and discussed insurance benefits, pt v/u. Reviewed Cardiac Rehab Routine and RPE scale, pt v/u. Developed individual treatment plan and goals set with patient; pt in agreement with plan and no further questions at this time. Performed six minute walk test.Next visit scheduled for Friday 10/21/22 at 1 pm class. Instructed on checking pre and post exercise BS for first 6 exercise sessions.     Ernestine Taylor RN  10/19/2022

## 2022-10-21 ENCOUNTER — HOSPITAL ENCOUNTER (OUTPATIENT)
Dept: CARDIAC REHAB | Age: 77
Setting detail: THERAPIES SERIES
Discharge: HOME | End: 2022-10-21
Payer: MEDICARE

## 2022-10-21 PROCEDURE — 93798 PHYS/QHP OP CAR RHAB W/ECG: CPT

## 2022-10-24 ENCOUNTER — HOSPITAL ENCOUNTER (OUTPATIENT)
Dept: CARDIAC REHAB | Age: 77
Setting detail: THERAPIES SERIES
Discharge: HOME | End: 2022-10-24
Payer: MEDICARE

## 2022-10-24 PROCEDURE — 93798 PHYS/QHP OP CAR RHAB W/ECG: CPT

## 2022-10-26 ENCOUNTER — HOSPITAL ENCOUNTER (OUTPATIENT)
Dept: CARDIAC REHAB | Age: 77
Setting detail: THERAPIES SERIES
Discharge: HOME | End: 2022-10-26
Payer: MEDICARE

## 2022-10-26 PROCEDURE — 93798 PHYS/QHP OP CAR RHAB W/ECG: CPT

## 2022-10-27 RX ORDER — CLOPIDOGREL BISULFATE 75 MG/1
75 TABLET ORAL DAILY
Qty: 90 TABLET | Refills: 3 | Status: SHIPPED | OUTPATIENT
Start: 2022-10-27

## 2022-10-27 RX ORDER — ROSUVASTATIN CALCIUM 20 MG/1
20 TABLET, COATED ORAL NIGHTLY
Qty: 90 TABLET | Refills: 3 | Status: SHIPPED | OUTPATIENT
Start: 2022-10-27

## 2022-10-28 ENCOUNTER — HOSPITAL ENCOUNTER (OUTPATIENT)
Dept: CARDIAC REHAB | Age: 77
Setting detail: THERAPIES SERIES
Discharge: HOME | End: 2022-10-28
Payer: MEDICARE

## 2022-10-28 PROCEDURE — 93798 PHYS/QHP OP CAR RHAB W/ECG: CPT

## 2022-10-31 ENCOUNTER — HOSPITAL ENCOUNTER (OUTPATIENT)
Dept: CARDIAC REHAB | Age: 77
Setting detail: THERAPIES SERIES
Discharge: HOME | End: 2022-10-31
Payer: MEDICARE

## 2022-10-31 PROCEDURE — 93798 PHYS/QHP OP CAR RHAB W/ECG: CPT

## 2022-11-02 ENCOUNTER — APPOINTMENT (OUTPATIENT)
Dept: CARDIAC REHAB | Age: 77
End: 2022-11-02
Payer: MEDICARE

## 2022-11-04 ENCOUNTER — APPOINTMENT (OUTPATIENT)
Dept: CARDIAC REHAB | Age: 77
End: 2022-11-04
Payer: MEDICARE

## 2022-11-07 ENCOUNTER — APPOINTMENT (OUTPATIENT)
Dept: CARDIAC REHAB | Age: 77
End: 2022-11-07
Payer: MEDICARE

## 2022-11-09 ENCOUNTER — APPOINTMENT (OUTPATIENT)
Dept: CARDIAC REHAB | Age: 77
End: 2022-11-09
Payer: MEDICARE

## 2022-11-11 ENCOUNTER — HOSPITAL ENCOUNTER (OUTPATIENT)
Dept: CARDIAC REHAB | Age: 77
Setting detail: THERAPIES SERIES
Discharge: HOME | End: 2022-11-11
Payer: MEDICARE

## 2022-11-11 PROCEDURE — 93798 PHYS/QHP OP CAR RHAB W/ECG: CPT

## 2022-11-14 ENCOUNTER — HOSPITAL ENCOUNTER (OUTPATIENT)
Dept: CARDIAC REHAB | Age: 77
Setting detail: THERAPIES SERIES
Discharge: HOME | End: 2022-11-14
Payer: MEDICARE

## 2022-11-14 PROCEDURE — 93798 PHYS/QHP OP CAR RHAB W/ECG: CPT

## 2022-11-16 ENCOUNTER — HOSPITAL ENCOUNTER (OUTPATIENT)
Dept: CARDIAC REHAB | Age: 77
Setting detail: THERAPIES SERIES
Discharge: HOME | End: 2022-11-16
Payer: MEDICARE

## 2022-11-16 PROCEDURE — 93798 PHYS/QHP OP CAR RHAB W/ECG: CPT

## 2022-11-17 ENCOUNTER — HOSPITAL ENCOUNTER (OUTPATIENT)
Age: 77
Discharge: HOME | End: 2022-11-17
Payer: MEDICARE

## 2022-11-17 ENCOUNTER — OFFICE VISIT (OUTPATIENT)
Dept: CARDIOLOGY CLINIC | Age: 77
End: 2022-11-17
Payer: MEDICARE

## 2022-11-17 VITALS
SYSTOLIC BLOOD PRESSURE: 134 MMHG | OXYGEN SATURATION: 97 % | BODY MASS INDEX: 18.52 KG/M2 | HEART RATE: 83 BPM | DIASTOLIC BLOOD PRESSURE: 58 MMHG | WEIGHT: 88.6 LBS

## 2022-11-17 DIAGNOSIS — I50.22 CHRONIC SYSTOLIC HEART FAILURE (HCC): ICD-10-CM

## 2022-11-17 DIAGNOSIS — I25.84 CORONARY ARTERY DISEASE DUE TO CALCIFIED CORONARY LESION: ICD-10-CM

## 2022-11-17 DIAGNOSIS — I50.22 CHRONIC SYSTOLIC HEART FAILURE (HCC): Primary | ICD-10-CM

## 2022-11-17 DIAGNOSIS — I25.10 CORONARY ARTERY DISEASE DUE TO CALCIFIED CORONARY LESION: ICD-10-CM

## 2022-11-17 DIAGNOSIS — E10.59 TYPE 1 DIABETES MELLITUS WITH OTHER CIRCULATORY COMPLICATION (HCC): ICD-10-CM

## 2022-11-17 LAB
ANION GAP SERPL CALCULATED.3IONS-SCNC: 12 MMOL/L (ref 3–16)
BUN BLDV-MCNC: 23 MG/DL (ref 7–20)
CALCIUM SERPL-MCNC: 9.8 MG/DL (ref 8.3–10.6)
CHLORIDE BLD-SCNC: 102 MMOL/L (ref 99–110)
CO2: 24 MMOL/L (ref 21–32)
CREAT SERPL-MCNC: 1.1 MG/DL (ref 0.6–1.2)
GFR SERPL CREATININE-BSD FRML MDRD: 52 ML/MIN/{1.73_M2}
GLUCOSE BLD-MCNC: 215 MG/DL (ref 70–99)
POTASSIUM SERPL-SCNC: 5.1 MMOL/L (ref 3.5–5.1)
PRO-BNP: 6911 PG/ML (ref 0–449)
SODIUM BLD-SCNC: 138 MMOL/L (ref 136–145)

## 2022-11-17 PROCEDURE — 1123F ACP DISCUSS/DSCN MKR DOCD: CPT | Performed by: CLINICAL NURSE SPECIALIST

## 2022-11-17 PROCEDURE — 36415 COLL VENOUS BLD VENIPUNCTURE: CPT

## 2022-11-17 PROCEDURE — 80048 BASIC METABOLIC PNL TOTAL CA: CPT

## 2022-11-17 PROCEDURE — 83880 ASSAY OF NATRIURETIC PEPTIDE: CPT

## 2022-11-17 PROCEDURE — 3046F HEMOGLOBIN A1C LEVEL >9.0%: CPT | Performed by: CLINICAL NURSE SPECIALIST

## 2022-11-17 PROCEDURE — 99214 OFFICE O/P EST MOD 30 MIN: CPT | Performed by: CLINICAL NURSE SPECIALIST

## 2022-11-17 NOTE — PROGRESS NOTES
[facility], operated by [facility]  Progress Note    Primary Care Doctor:  Cara Gann MD    Chief Complaint   Patient presents with    Congestive Heart Failure        History of Present Illness:  78 y.o. female with history of diabetes with A5C of 38.3, systolic heart failure   5/07-13/8286 for shortness of breath, acute systolic heart failure with elevated LFTs, bilateral pleural effusions, mildly anemia, LHC with PCI to LAD. I had the pleasure of seeing Kingsley Stockton in follow up for systolic heart failure. She is ambulatory and with her , Emilia Wolfe. She went to [facility] for a week and forgot her medications. She has started cardiac rehab and is enjoying it. She denies any chest pain, palpitations, lightheadedness or edema. Her blood sugar continues to be high at lunch time. She has been back on her medications for a week now     Past Medical History:   has a past medical history of Diabetes mellitus ([address] and Hypothyroid. Surgical History:   has no past surgical history on file. Social History:   reports that she has never smoked. She has never used smokeless tobacco. She reports that she does not drink alcohol and does not use drugs. Family History:   No family history on file. Home Medications:  Prior to Admission medications    Medication Sig Start Date End Date Taking? Authorizing Provider   clopidogrel (PLAVIX) 75 MG tablet Take 1 tablet by mouth daily 10/27/22  Yes Sydney Garcia MD   rosuvastatin (CRESTOR) 20 MG tablet Take 1 tablet by mouth nightly 10/27/22  Yes Sydney Garcia MD   SOLIQUA 100-33 UNT-MCG/ML SOPN Inject 16 Units as directed every morning Before breakfast 9/28/22  Yes Historical Provider, MD   sacubitril-valsartan (ENTRESTO) 24-26 MG per tablet Take 0.5 tablets by mouth 2 times daily  Patient taking differently: Take 1 tablet by mouth 2 times daily 10/5/22  Yes Guillermina Marie APRN - CNS   aspirin EC 81 MG EC tablet Take 1 tablet by mouth in the morning.  8/15/22  Yes Lui Cruz THANG Castro CNP   metoprolol succinate (TOPROL XL) 25 MG extended release tablet Take 0.5 tablets by mouth nightly  Patient taking differently: Take 25 mg by mouth nightly 8/14/22  Yes THANG Anders CNP   levothyroxine (SYNTHROID) 50 MCG tablet Take 50 mcg by mouth in the morning. Yes Historical Provider, MD   therapeutic multivitamin-minerals (THERAGRAN-M) tablet Take 1 tablet by mouth daily. Yes Historical Provider, MD   empagliflozin (JARDIANCE) 25 MG tablet Take 25 mg by mouth daily  Patient not taking: Reported on 11/17/2022    Historical Provider, MD   carvedilol (COREG) 3.125 MG tablet Take 1 tablet by mouth in the morning and 1 tablet in the evening. Take with meals. 8/14/22 8/14/22  Damian Hill MD        Allergies:  Codeine     Review of Systems:   Constitutional: there has been no unanticipated weight loss. There's been no change in energy level, sleep pattern, or activity level. Eyes: No visual changes or diplopia. No scleral icterus. ENT: No Headaches, hearing loss or vertigo. No mouth sores or sore throat. Cardiovascular: Reviewed in HPI  Respiratory: No cough or wheezing, no sputum production. No hematemesis. Gastrointestinal: No abdominal pain, appetite loss, blood in stools. No change in bowel or bladder habits. Genitourinary: No dysuria, trouble voiding, or hematuria. Musculoskeletal:  No gait disturbance, weakness or joint complaints. Integumentary: No rash or pruritis. Neurological: No headache, diplopia, change in muscle strength, numbness or tingling. No change in gait, balance, coordination, mood, affect, memory, mentation, behavior. Psychiatric: No anxiety, no depression. Endocrine: No malaise, fatigue or temperature intolerance. No excessive thirst, fluid intake, or urination. No tremor. Hematologic/Lymphatic: No abnormal bruising or bleeding, blood clots or swollen lymph nodes. Allergic/Immunologic: No nasal congestion or hives.     Physical Examination:    Vitals:    11/17/22 1438   BP: (!) 134/58   Pulse: 83   SpO2: 97%   Weight: 88 lb 9.6 oz (40.2 kg)        Constitutional and General Appearance: Warm and dry, no apparent distress, normal coloration  HEENT:  Normocephalic, atraumatic, very thin  Respiratory:  Normal excursion and expansion without use of accessory muscles  Resp Auscultation: Normal breath sounds without dullness  Cardiovascular: The apical impulses not displaced  Heart tones are crisp and normal  JVP normal cm H2O  Regular rate and rhythm  Peripheral pulses are symmetrical and full  There is no clubbing, cyanosis of the extremities.   no edema  Pedal Pulses: 2+ and equal   Abdomen:  No masses or tenderness  Liver/Spleen: No Abnormalities Noted  Neurological/Psychiatric:  Alert and oriented in all spheres  Moves all extremities well  Exhibits normal gait balance and coordination  No abnormalities of mood, affect, memory, mentation, or behavior are noted    Lab Data:    CBC:   Lab Results   Component Value Date/Time    WBC 7.3 08/14/2022 06:17 AM    WBC 8.3 08/13/2022 04:46 AM    WBC 8.1 08/12/2022 04:19 AM    RBC 3.61 08/14/2022 06:17 AM    RBC 3.68 08/13/2022 04:46 AM    RBC 3.64 08/12/2022 04:19 AM    HGB 11.2 08/14/2022 06:17 AM    HGB 11.4 08/13/2022 04:46 AM    HGB 11.4 08/12/2022 04:19 AM    HCT 33.0 08/14/2022 06:17 AM    HCT 34.3 08/13/2022 04:46 AM    HCT 32.7 08/12/2022 04:19 AM    MCV 91.3 08/14/2022 06:17 AM    MCV 93.0 08/13/2022 04:46 AM    MCV 89.9 08/12/2022 04:19 AM    RDW 12.8 08/14/2022 06:17 AM    RDW 13.1 08/13/2022 04:46 AM    RDW 12.9 08/12/2022 04:19 AM     08/14/2022 06:17 AM     08/13/2022 04:46 AM     08/12/2022 04:19 AM     BMP:  Lab Results   Component Value Date/Time     10/05/2022 11:48 AM     09/06/2022 10:30 AM     08/24/2022 04:20 PM    K 5.2 10/05/2022 11:48 AM    K 4.5 09/06/2022 10:30 AM    K 4.7 08/24/2022 04:20 PM    K 4.6 08/10/2022 06:50 PM     10/05/2022 11:48 AM     09/06/2022 10:30 AM     08/24/2022 04:20 PM    CO2 22 10/05/2022 11:48 AM    CO2 23 09/06/2022 10:30 AM    CO2 24 08/24/2022 04:20 PM    BUN 24 10/05/2022 11:48 AM    BUN 23 09/06/2022 10:30 AM    BUN 24 08/24/2022 04:20 PM    CREATININE 0.8 10/05/2022 11:48 AM    CREATININE 0.9 09/06/2022 10:30 AM    CREATININE 1.2 08/24/2022 04:20 PM     BNP:   Lab Results   Component Value Date/Time    PROBNP 3,724 10/05/2022 11:48 AM    PROBNP 4,780 09/06/2022 10:30 AM    PROBNP 4,199 08/24/2022 04:20 PM     Cardiac Imaging:    Cardiac Cath PCI: 8/10/2022  Dr Lester Meraz  Anatomy:   LM-nml   LAD-mid 95%  Cx-nml  OM- nml  RCA-dom nml  RPDA- nml  LVEDP- 1     Intervention  ~Successful PCI to LAD with 2.25x18 ELIAS. Excellent Result. Contrast: 20  Flouro Time: 3.7  Access: R radial a     Impression  ~Coronary Angiography w/ severe single vessel CAD  ~Successful complex angioplasty and stenting of LAD    Recommendation  ~Aggressive medical treatment and risk factor modification  ~1. Post cath IVF. Bedrest.   2. Recommend beta blocker, ace/arb, high potency statin, aspirin and plavix for 6-12 months. 3. Referral to outpatient cardiac rehab phase II will be deferred until patient follow-up in office and then determine patient safety and appropriateness to proceed  4. Patient has been advised on the importance of regular exercise of at least 20-30 minutes daily. 5. Patient counseled about and offered assistance for smoking cessation   6. Follow up in 2 weeks with cardiology    Echo 8/11/2022  Summary   -Overall left ventricular systolic function appears severely reduced.   -Ejection fraction is visually estimated to be 15-20%. -There is severe global hypokinesis with varying regional wall motion   abnormalities. -Grade II diastolic dysfunction with elevated LV filling pressures. e/e' =   16.8.   -Aortic valve appears sclerotic but opens adequately.   -Thickened mitral valve leaflets.    -Mild mitral regurgitation.   -Trivial tricuspid regurgitation.   -Trivial pericardial effusion. Assessment:    1. Chronic systolic heart failure (HCC) on arb, bb and sglt2; no aldosterone antagonist due to hyperkalemia   2. Coronary artery disease due to calcified coronary lesion    3.  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    THANG Diaz - CNS, CNS, 11/17/2022, 3:12 PM

## 2022-11-17 NOTE — PATIENT INSTRUCTIONS
Blood work today  Call if you every forget medications  Continue cardiac rehab  RTO in 2 months  I might increase entresto to full tablet twice a day

## 2022-11-18 ENCOUNTER — TELEPHONE (OUTPATIENT)
Dept: CARDIOLOGY CLINIC | Age: 77
End: 2022-11-18

## 2022-11-18 ENCOUNTER — HOSPITAL ENCOUNTER (OUTPATIENT)
Dept: CARDIAC REHAB | Age: 77
Setting detail: THERAPIES SERIES
Discharge: HOME | End: 2022-11-18
Payer: MEDICARE

## 2022-11-18 PROCEDURE — 93798 PHYS/QHP OP CAR RHAB W/ECG: CPT

## 2022-11-18 NOTE — TELEPHONE ENCOUNTER
----- Message from THANG Ferris sent at 11/18/2022 11:11 AM EST -----  Her fluid level is elevated most likely as she did not take her medications for a week and resumed them last week  Please find out why she is not taking her jardiance

## 2022-11-18 NOTE — TELEPHONE ENCOUNTER
Tried to reach patient no answer on home phone number, Tried cell phone number spoke to patient's spouse and patient she is taking jardiance. She has restarted medications. Patient is asking if she should get another echo or LHC since she had a stent placed?   Please advise

## 2022-11-21 ENCOUNTER — HOSPITAL ENCOUNTER (OUTPATIENT)
Dept: CARDIAC REHAB | Age: 77
Setting detail: THERAPIES SERIES
Discharge: HOME | End: 2022-11-21
Payer: MEDICARE

## 2022-11-21 PROCEDURE — 93798 PHYS/QHP OP CAR RHAB W/ECG: CPT

## 2022-11-21 NOTE — TELEPHONE ENCOUNTER
Ask her is she has any lasix?   And if she does I want her to take 20 mg of lasix for 4 days only to get the fluid level down

## 2022-11-22 NOTE — TELEPHONE ENCOUNTER
Tried to reach patient on home and cell no answer on home phone. Tried cell phone and LMOM for patient to call us with any questions about the Lasix changes.

## 2022-11-23 ENCOUNTER — HOSPITAL ENCOUNTER (OUTPATIENT)
Dept: CARDIAC REHAB | Age: 77
Setting detail: THERAPIES SERIES
Discharge: HOME | End: 2022-11-23
Payer: MEDICARE

## 2022-11-23 PROCEDURE — 93798 PHYS/QHP OP CAR RHAB W/ECG: CPT

## 2022-11-28 ENCOUNTER — TELEPHONE (OUTPATIENT)
Dept: CARDIOLOGY CLINIC | Age: 77
End: 2022-11-28

## 2022-11-28 ENCOUNTER — HOSPITAL ENCOUNTER (OUTPATIENT)
Dept: CARDIAC REHAB | Age: 77
Setting detail: THERAPIES SERIES
Discharge: HOME | End: 2022-11-28
Payer: MEDICARE

## 2022-11-28 PROCEDURE — 93798 PHYS/QHP OP CAR RHAB W/ECG: CPT

## 2022-11-28 NOTE — TELEPHONE ENCOUNTER
Tried to reach patient LMOM to return our call to make sure she received the message about the Lasix dose change.

## 2022-11-28 NOTE — TELEPHONE ENCOUNTER
Pt came into the office to ask questions about this issue. The Jardiance 25mg was prescribed by Dr. Geovany Soriano on the bottle. Pt was advised that this dose is not the usual cardiology dose and therefore should be prescribed by her PCP. She states she will call him. Then she states its too expensive. Asked pt to check Good Rx at several different pharmacies and she would then be using Good Rx and not her prescription drug coverage. Educated pt on how this medication helps the heart, diabetes and the kidneys. Also educated that her insulin dose may need to be adjusted with the use of Jardiance as well as good van hygiene to avoid UTI and yeast infections. Pt states she would look into it and thanked me very much for the assistance.

## 2022-11-28 NOTE — TELEPHONE ENCOUNTER
Pt called saying they went to go  their prescription for jardiance and it was $182, pt was requesting if she could get 2 samples or a generic version of medication.    Ph. [phone]  Please advise   Thank you

## 2022-11-30 ENCOUNTER — HOSPITAL ENCOUNTER (OUTPATIENT)
Dept: CARDIAC REHAB | Age: 77
Setting detail: THERAPIES SERIES
Discharge: HOME | End: 2022-11-30
Payer: MEDICARE

## 2022-11-30 PROCEDURE — 93798 PHYS/QHP OP CAR RHAB W/ECG: CPT

## 2022-12-02 ENCOUNTER — HOSPITAL ENCOUNTER (OUTPATIENT)
Dept: CARDIAC REHAB | Age: 77
Setting detail: THERAPIES SERIES
Discharge: HOME | End: 2022-12-02
Payer: MEDICARE

## 2022-12-02 PROCEDURE — 93798 PHYS/QHP OP CAR RHAB W/ECG: CPT

## 2022-12-05 ENCOUNTER — HOSPITAL ENCOUNTER (OUTPATIENT)
Dept: CARDIAC REHAB | Age: 77
Setting detail: THERAPIES SERIES
Discharge: HOME | End: 2022-12-05
Payer: MEDICARE

## 2022-12-05 PROCEDURE — 93798 PHYS/QHP OP CAR RHAB W/ECG: CPT

## 2022-12-07 ENCOUNTER — HOSPITAL ENCOUNTER (OUTPATIENT)
Dept: CARDIAC REHAB | Age: 77
Setting detail: THERAPIES SERIES
Discharge: HOME | End: 2022-12-07
Payer: MEDICARE

## 2022-12-07 PROCEDURE — 93798 PHYS/QHP OP CAR RHAB W/ECG: CPT

## 2022-12-09 ENCOUNTER — HOSPITAL ENCOUNTER (OUTPATIENT)
Dept: CARDIAC REHAB | Age: 77
Setting detail: THERAPIES SERIES
Discharge: HOME | End: 2022-12-09
Payer: MEDICARE

## 2022-12-09 ENCOUNTER — HOSPITAL ENCOUNTER (OUTPATIENT)
Age: 77
Discharge: HOME | End: 2022-12-09
Payer: MEDICARE

## 2022-12-09 ENCOUNTER — HOSPITAL ENCOUNTER (OUTPATIENT)
Dept: GENERAL RADIOLOGY | Age: 77
Discharge: HOME | End: 2022-12-09
Payer: MEDICARE
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
## 2022-12-09 PROCEDURE — 73120 X-RAY EXAM OF HAND: CPT

## 2022-12-09 PROCEDURE — 93798 PHYS/QHP OP CAR RHAB W/ECG: CPT

## 2022-12-09 PROCEDURE — 73100 X-RAY EXAM OF WRIST: CPT

## 2022-12-12 ENCOUNTER — HOSPITAL ENCOUNTER (OUTPATIENT)
Dept: CARDIAC REHAB | Age: 77
Setting detail: THERAPIES SERIES
Discharge: HOME | End: 2022-12-12
Payer: MEDICARE

## 2022-12-12 PROCEDURE — 93798 PHYS/QHP OP CAR RHAB W/ECG: CPT

## 2022-12-14 ENCOUNTER — HOSPITAL ENCOUNTER (OUTPATIENT)
Dept: CARDIAC REHAB | Age: 77
Setting detail: THERAPIES SERIES
Discharge: HOME | End: 2022-12-14
Payer: MEDICARE

## 2022-12-14 PROCEDURE — 93798 PHYS/QHP OP CAR RHAB W/ECG: CPT

## 2022-12-16 ENCOUNTER — HOSPITAL ENCOUNTER (OUTPATIENT)
Dept: CARDIAC REHAB | Age: 77
Setting detail: THERAPIES SERIES
Discharge: HOME | End: 2022-12-16
Payer: MEDICARE

## 2022-12-16 ENCOUNTER — TELEPHONE (OUTPATIENT)
Dept: CARDIOLOGY CLINIC | Age: 77
End: 2022-12-16

## 2022-12-16 PROCEDURE — 93798 PHYS/QHP OP CAR RHAB W/ECG: CPT

## 2022-12-16 NOTE — TELEPHONE ENCOUNTER
[facility] needs a Dr. Susan Arthur stating pt needs a Diabetic Diet in order for them to provide it. Please fax note to [phone]. Call Amilcar Betancourt with questions.  [phone] x [phone]

## 2022-12-19 ENCOUNTER — TELEPHONE (OUTPATIENT)
Dept: CARDIOLOGY CLINIC | Age: 77
End: 2022-12-19

## 2022-12-19 ENCOUNTER — HOSPITAL ENCOUNTER (OUTPATIENT)
Dept: CARDIAC REHAB | Age: 77
Setting detail: THERAPIES SERIES
Discharge: HOME | End: 2022-12-19
Payer: MEDICARE

## 2022-12-19 PROCEDURE — 93798 PHYS/QHP OP CAR RHAB W/ECG: CPT

## 2022-12-19 NOTE — TELEPHONE ENCOUNTER
Sofi Avina from cardiac rehab callling wanting to know if pt should have an echo since pt hasnt had one since Aug and has not been exercising  [phone]

## 2022-12-21 ENCOUNTER — HOSPITAL ENCOUNTER (OUTPATIENT)
Dept: CARDIAC REHAB | Age: 77
Setting detail: THERAPIES SERIES
Discharge: HOME | End: 2022-12-21
Payer: MEDICARE

## 2022-12-21 PROCEDURE — 93798 PHYS/QHP OP CAR RHAB W/ECG: CPT

## 2022-12-22 ENCOUNTER — HOSPITAL ENCOUNTER (OUTPATIENT)
Dept: CARDIAC REHAB | Age: 77
Setting detail: THERAPIES SERIES
Discharge: HOME | End: 2022-12-22
Payer: MEDICARE

## 2022-12-22 PROCEDURE — 93798 PHYS/QHP OP CAR RHAB W/ECG: CPT

## 2022-12-28 ENCOUNTER — HOSPITAL ENCOUNTER (OUTPATIENT)
Dept: CARDIAC REHAB | Age: 77
Setting detail: THERAPIES SERIES
Discharge: HOME | End: 2022-12-28
Payer: MEDICARE

## 2022-12-28 ENCOUNTER — TELEPHONE (OUTPATIENT)
Dept: CARDIOLOGY CLINIC | Age: 77
End: 2022-12-28

## 2022-12-28 DIAGNOSIS — I50.22 CHRONIC SYSTOLIC HEART FAILURE (HCC): Primary | ICD-10-CM

## 2022-12-28 PROCEDURE — 93798 PHYS/QHP OP CAR RHAB W/ECG: CPT

## 2022-12-28 NOTE — TELEPHONE ENCOUNTER
Let her know that I placed an order for Dr Nayely Martinez for her diabetes  It may take some time for her to get an appointment  Give her [place] phone number as well  thanks

## 2022-12-28 NOTE — TELEPHONE ENCOUNTER
Pt wants to know if [facility] will put in a order for a Endocrinologist?,  so that the can get educated on how to eat healthy due to their heart condition. Pt stated PCP sent a note however it was denied dur to PCP being out of network.     Prabhakar Hodges   [phone]

## 2022-12-30 ENCOUNTER — HOSPITAL ENCOUNTER (OUTPATIENT)
Dept: CARDIAC REHAB | Age: 77
Setting detail: THERAPIES SERIES
Discharge: HOME | End: 2022-12-30
Payer: MEDICARE

## 2022-12-30 PROCEDURE — 93798 PHYS/QHP OP CAR RHAB W/ECG: CPT

## 2023-01-04 ENCOUNTER — HOSPITAL ENCOUNTER (OUTPATIENT)
Dept: CARDIAC REHAB | Age: 78
Setting detail: THERAPIES SERIES
Discharge: HOME | End: 2023-01-04
Payer: MEDICARE

## 2023-01-04 PROCEDURE — 93798 PHYS/QHP OP CAR RHAB W/ECG: CPT

## 2023-01-05 ENCOUNTER — HOSPITAL ENCOUNTER (OUTPATIENT)
Age: 78
Discharge: HOME | End: 2023-01-05
Payer: MEDICARE

## 2023-01-05 DIAGNOSIS — E11.65 TYPE 2 DIABETES MELLITUS WITH HYPERGLYCEMIA, UNSPECIFIED WHETHER LONG TERM INSULIN USE (HCC): ICD-10-CM

## 2023-01-05 DIAGNOSIS — E11.59 TYPE 2 DIABETES MELLITUS WITH OTHER CIRCULATORY COMPLICATION, WITHOUT LONG-TERM CURRENT USE OF INSULIN (HCC): Primary | ICD-10-CM

## 2023-01-05 LAB
A/G RATIO: 1.1 (ref 1.1–2.2)
ALBUMIN SERPL-MCNC: 3.8 G/DL (ref 3.4–5)
ALP BLD-CCNC: 100 U/L (ref 40–129)
ALT SERPL-CCNC: 26 U/L (ref 10–40)
ANION GAP SERPL CALCULATED.3IONS-SCNC: 10 MMOL/L (ref 3–16)
AST SERPL-CCNC: 30 U/L (ref 15–37)
BASOPHILS ABSOLUTE: 0.1 K/UL (ref 0–0.2)
BASOPHILS RELATIVE PERCENT: 0.7 %
BILIRUB SERPL-MCNC: 0.3 MG/DL (ref 0–1)
BUN BLDV-MCNC: 30 MG/DL (ref 7–20)
CALCIUM SERPL-MCNC: 9.7 MG/DL (ref 8.3–10.6)
CHLORIDE BLD-SCNC: 103 MMOL/L (ref 99–110)
CHOLESTEROL, TOTAL: 165 MG/DL (ref 0–199)
CO2: 24 MMOL/L (ref 21–32)
CREAT SERPL-MCNC: 0.9 MG/DL (ref 0.6–1.2)
EOSINOPHILS ABSOLUTE: 0.1 K/UL (ref 0–0.6)
EOSINOPHILS RELATIVE PERCENT: 2 %
FOLATE: >20 NG/ML (ref 4.78–24.2)
GFR SERPL CREATININE-BSD FRML MDRD: >60 ML/MIN/{1.73_M2}
GLUCOSE BLD-MCNC: 69 MG/DL (ref 70–99)
HCT VFR BLD CALC: 36.8 % (ref 36–48)
HDLC SERPL-MCNC: 62 MG/DL (ref 40–60)
HEMOGLOBIN: 12 G/DL (ref 12–16)
LDL CHOLESTEROL CALCULATED: 90 MG/DL
LYMPHOCYTES ABSOLUTE: 1.5 K/UL (ref 1–5.1)
LYMPHOCYTES RELATIVE PERCENT: 20.6 %
MCH RBC QN AUTO: 29.8 PG (ref 26–34)
MCHC RBC AUTO-ENTMCNC: 32.5 G/DL (ref 31–36)
MCV RBC AUTO: 91.6 FL (ref 80–100)
MONOCYTES ABSOLUTE: 0.5 K/UL (ref 0–1.3)
MONOCYTES RELATIVE PERCENT: 6.7 %
NEUTROPHILS ABSOLUTE: 5.2 K/UL (ref 1.7–7.7)
NEUTROPHILS RELATIVE PERCENT: 70 %
PDW BLD-RTO: 12.2 % (ref 12.4–15.4)
PLATELET # BLD: 247 K/UL (ref 135–450)
PMV BLD AUTO: 8.8 FL (ref 5–10.5)
POTASSIUM SERPL-SCNC: 4.8 MMOL/L (ref 3.5–5.1)
RBC # BLD: 4.02 M/UL (ref 4–5.2)
SEDIMENTATION RATE, ERYTHROCYTE: 28 MM/HR (ref 0–30)
SODIUM BLD-SCNC: 137 MMOL/L (ref 136–145)
T4 FREE: 1 NG/DL (ref 0.9–1.8)
TOTAL PROTEIN: 7.3 G/DL (ref 6.4–8.2)
TRIGL SERPL-MCNC: 66 MG/DL (ref 0–150)
TSH REFLEX FT4: 5.18 UIU/ML (ref 0.27–4.2)
VITAMIN B-12: 640 PG/ML (ref 211–911)
VLDLC SERPL CALC-MCNC: 13 MG/DL
WBC # BLD: 7.5 K/UL (ref 4–11)

## 2023-01-05 PROCEDURE — 82746 ASSAY OF FOLIC ACID SERUM: CPT

## 2023-01-05 PROCEDURE — 84443 ASSAY THYROID STIM HORMONE: CPT

## 2023-01-05 PROCEDURE — 80053 COMPREHEN METABOLIC PANEL: CPT

## 2023-01-05 PROCEDURE — 80061 LIPID PANEL: CPT

## 2023-01-05 PROCEDURE — 83036 HEMOGLOBIN GLYCOSYLATED A1C: CPT

## 2023-01-05 PROCEDURE — 36415 COLL VENOUS BLD VENIPUNCTURE: CPT

## 2023-01-05 PROCEDURE — 84439 ASSAY OF FREE THYROXINE: CPT

## 2023-01-05 PROCEDURE — 82607 VITAMIN B-12: CPT

## 2023-01-05 PROCEDURE — 85025 COMPLETE CBC W/AUTO DIFF WBC: CPT

## 2023-01-05 PROCEDURE — 85652 RBC SED RATE AUTOMATED: CPT

## 2023-01-06 ENCOUNTER — HOSPITAL ENCOUNTER (OUTPATIENT)
Dept: CARDIAC REHAB | Age: 78
Setting detail: THERAPIES SERIES
Discharge: HOME | End: 2023-01-06
Payer: MEDICARE

## 2023-01-06 LAB
ESTIMATED AVERAGE GLUCOSE: 180 MG/DL
HBA1C MFR BLD: 7.9 %

## 2023-01-06 PROCEDURE — 93798 PHYS/QHP OP CAR RHAB W/ECG: CPT

## 2023-01-09 ENCOUNTER — HOSPITAL ENCOUNTER (OUTPATIENT)
Dept: CARDIAC REHAB | Age: 78
Setting detail: THERAPIES SERIES
Discharge: HOME | End: 2023-01-09
Payer: MEDICARE

## 2023-01-09 PROCEDURE — 93798 PHYS/QHP OP CAR RHAB W/ECG: CPT

## 2023-01-11 ENCOUNTER — HOSPITAL ENCOUNTER (OUTPATIENT)
Dept: CARDIAC REHAB | Age: 78
Setting detail: THERAPIES SERIES
Discharge: HOME | End: 2023-01-11
Payer: MEDICARE

## 2023-01-11 PROCEDURE — 93798 PHYS/QHP OP CAR RHAB W/ECG: CPT

## 2023-01-13 ENCOUNTER — HOSPITAL ENCOUNTER (OUTPATIENT)
Dept: CARDIAC REHAB | Age: 78
Setting detail: THERAPIES SERIES
Discharge: HOME | End: 2023-01-13
Payer: MEDICARE

## 2023-01-13 PROCEDURE — 93798 PHYS/QHP OP CAR RHAB W/ECG: CPT

## 2023-01-16 ENCOUNTER — HOSPITAL ENCOUNTER (OUTPATIENT)
Dept: CARDIAC REHAB | Age: 78
Setting detail: THERAPIES SERIES
Discharge: HOME | End: 2023-01-16
Payer: MEDICARE

## 2023-01-16 PROCEDURE — 93798 PHYS/QHP OP CAR RHAB W/ECG: CPT

## 2023-01-18 ENCOUNTER — HOSPITAL ENCOUNTER (OUTPATIENT)
Dept: CARDIAC REHAB | Age: 78
Setting detail: THERAPIES SERIES
Discharge: HOME | End: 2023-01-18
Payer: MEDICARE

## 2023-01-18 ENCOUNTER — OFFICE VISIT (OUTPATIENT)
Dept: CARDIOLOGY CLINIC | Age: 78
End: 2023-01-18
Payer: MEDICARE

## 2023-01-18 ENCOUNTER — APPOINTMENT (OUTPATIENT)
Dept: CARDIAC REHAB | Age: 78
End: 2023-01-18
Payer: MEDICARE

## 2023-01-18 VITALS
HEIGHT: 58 IN | SYSTOLIC BLOOD PRESSURE: 120 MMHG | DIASTOLIC BLOOD PRESSURE: 60 MMHG | BODY MASS INDEX: 18.26 KG/M2 | WEIGHT: 87 LBS | HEART RATE: 70 BPM | OXYGEN SATURATION: 98 %

## 2023-01-18 DIAGNOSIS — I25.10 CORONARY ARTERY DISEASE DUE TO CALCIFIED CORONARY LESION: ICD-10-CM

## 2023-01-18 DIAGNOSIS — I50.22 CHRONIC SYSTOLIC HEART FAILURE (HCC): Primary | ICD-10-CM

## 2023-01-18 DIAGNOSIS — I25.84 CORONARY ARTERY DISEASE DUE TO CALCIFIED CORONARY LESION: ICD-10-CM

## 2023-01-18 DIAGNOSIS — E10.59 TYPE 1 DIABETES MELLITUS WITH OTHER CIRCULATORY COMPLICATION (HCC): ICD-10-CM

## 2023-01-18 PROCEDURE — 93798 PHYS/QHP OP CAR RHAB W/ECG: CPT

## 2023-01-18 PROCEDURE — 1123F ACP DISCUSS/DSCN MKR DOCD: CPT | Performed by: CLINICAL NURSE SPECIALIST

## 2023-01-18 PROCEDURE — 3051F HG A1C>EQUAL 7.0%<8.0%: CPT | Performed by: CLINICAL NURSE SPECIALIST

## 2023-01-18 PROCEDURE — 99214 OFFICE O/P EST MOD 30 MIN: CPT | Performed by: CLINICAL NURSE SPECIALIST

## 2023-01-18 PROCEDURE — 93797 PHYS/QHP OP CAR RHAB WO ECG: CPT

## 2023-01-18 NOTE — CARDIO/PULMONARY
[facility] Cardiovascular Rehabilitation  Nutrition Counseling      Patient Name: Kirsty Montes. Date of Birth: [id number]. MRN: [id number]      Assessment  Patient is a 78 y.o. female seen with spouse for initial MNT visit for  cardiac nutrition, diabetes. Pertinent Medical History: CHF, CAD, DM, reported stents in August    \"Rate Your Plate\" initial assessment score: 41 to 57 (44) - there are some ways you can make your eating habits healthier     \"Rate Your Plate\" initial nutrition goals: None. Current knowledge on the nutrition topic: not knowledgeable at all per pt report    Is the patient already making diet changes? Yes If so, what? Reduced sodium and fat intake. Diet Recall:  Pt is vegetarian. What do you eat for:  - Breakfast: banana, 1/2 cup of milk, slice of bread with peanut butter, seeds   - Lunch: rice, vegetables (cauliflower, eggplant, legumes)  - Dinner: bread, lettuce, vegetable burger or oatmeal.   - Snacks: once in awhile veggie chips, chickpeas. - Beverages: milk, water, tea, coffee, cranberry juice. -Who is Responsible for Meal Preparation? Patient    -Who is Responsible for Grocery Shopping? Patient     -How Many Times Per Week Do You Eat Meals Outside the Home? 1 at meditation but not to restaurants     Diagnosis  Nutrition Diagnosis: Food and nutrition-related knowledge deficit related to Lack of previous MNT/currently undergoing MNT as evidenced by Conditions associated with a diagnosis or treatment.       Intervention  Instructed the Patient on:   [x] Label Reading   [] Eating Out  [x] High Sodium foods    [x] Low Sodium foods  [x] High Fat foods  [x] Low Fat foods  [] Fluid Restriction   [x] Portion Sizes  [x] Carb Counting   [] Fiber  [] Other:     Educational Materials Provided:   [] Nutrition Care Manual (NCM) Heart Failure Nutrition Therapy   [] NCM Sodium Free Flavoring Tips    [] NCM Heart Healthy Food Alternatives List (low sodium, low saturated/trans fat  [] NCM Heart Healthy Nutrition Therapy   [] Heart Healthy Eating Label Reading Tips   [] Healthy Eating when Eating Out  [] Controlling Your Fluids   [x] NCM Carbohydrate Counting for People with Diabetes   [x] Managing Your Diabetes Plate Method   [x] RD made cardiac rehabilitation nutrition handout using AHA, NCM resources   [] Other:       -General Diet Recommendations: low fat, low cholesterol, substitute healthy fats, such as olive oil, canola oil, grapeseed oil for saturated fats like butter, margarine, and shortening, minimize processed foods, reduce sodium intake, and minimize simple sugars. Comprehension verified using teachback method. Monitoring & Evaluation  -Receptiveness to education/goals: Agreeable.  -Evaluation of education: Indicates understanding.  -Readiness to change: action - ready to set action plan and implement. -Expected compliance: good. Total time involved in direct patient education: 60 minutes for initial MNT visit    Return Appointment:  [x] Your treatments are completed and no follow-up treatments are scheduled at this time.        Electronically signed by Nasrin Solorio MS, RD, LD on 1/18/2023 at 1:07 PM

## 2023-01-18 NOTE — PROGRESS NOTES
[facility]  Progress Note    Primary Care Doctor:  Shlomo Dorantes MD    Chief Complaint   Patient presents with    Follow-up    Congestive Heart Failure        History of Present Illness:  78 y.o. female with history of diabetes with B6U of 26.0, systolic heart failure   6/09-69/8698 for shortness of breath, acute systolic heart failure with elevated LFTs, bilateral pleural effusions, mildly anemia, LHC with PCI to LAD. I had the pleasure of seeing Seng Moy in follow up for systolic heart failure. She is ambulatory and by her self. She denies any chest pain, palpitations, lightheadedness, edema or shortness of breath. She is almost finished with cardiac rehab. She is taking all her medications and has not missed any. Labs reviewed and stable    Past Medical History:   has a past medical history of Diabetes mellitus ([address] and Hypothyroid. Surgical History:   has no past surgical history on file. Social History:   reports that she has never smoked. She has never used smokeless tobacco. She reports that she does not drink alcohol and does not use drugs. Family History:   No family history on file. Home Medications:  Prior to Admission medications    Medication Sig Start Date End Date Taking?  Authorizing Provider   clopidogrel (PLAVIX) 75 MG tablet Take 1 tablet by mouth daily 1/5/23  Yes Shlomo Dorantes MD   rosuvastatin (CRESTOR) 20 MG tablet Take 1 tablet by mouth nightly 1/5/23  Yes Shlomo Dorantes MD   empagliflozin (JARDIANCE) 10 MG tablet Take 1 tablet by mouth daily 1/5/23  Yes Shlomo Dorantes MD   sacubitril-valsartan (ENTRESTO) 24-26 MG per tablet Take 0.5 tablets by mouth 2 times daily 1/5/23  Yes Shlomo Dorantes MD   aspirin EC 81 MG EC tablet Take 1 tablet by mouth daily 1/5/23  Yes Shlomo Dorantes MD   metoprolol succinate (TOPROL XL) 25 MG extended release tablet Take 0.5 tablets by mouth nightly 1/5/23  Yes Shlomo Dorantes MD   levothyroxine (SYNTHROID) 50 MCG tablet Take 1 tablet by mouth daily 1/5/23  Yes Yulia Beard MD   insulin glargine (LANTUS SOLOSTAR) 100 UNIT/ML injection pen Inject 10 Units into the skin nightly 1/4/23  Yes Yulia Beard MD   therapeutic multivitamin-minerals (THERAGRAN-M) tablet Take 1 tablet by mouth daily. Yes Historical Provider, MD   carvedilol (COREG) 3.125 MG tablet Take 1 tablet by mouth in the morning and 1 tablet in the evening. Take with meals. 8/14/22 8/14/22  Lavonne Martinez MD        Allergies:  Codeine     Review of Systems:   Constitutional: there has been no unanticipated weight loss. There's been no change in energy level, sleep pattern, or activity level. Eyes: No visual changes or diplopia. No scleral icterus. ENT: No Headaches, hearing loss or vertigo. No mouth sores or sore throat. Cardiovascular: Reviewed in HPI  Respiratory: No cough or wheezing, no sputum production. No hematemesis. Gastrointestinal: No abdominal pain, appetite loss, blood in stools. No change in bowel or bladder habits. Genitourinary: No dysuria, trouble voiding, or hematuria. Musculoskeletal:  No gait disturbance, weakness or joint complaints. Integumentary: No rash or pruritis. Neurological: No headache, diplopia, change in muscle strength, numbness or tingling. No change in gait, balance, coordination, mood, affect, memory, mentation, behavior. Psychiatric: No anxiety, no depression. Endocrine: No malaise, fatigue or temperature intolerance. No excessive thirst, fluid intake, or urination. No tremor. Hematologic/Lymphatic: No abnormal bruising or bleeding, blood clots or swollen lymph nodes. Allergic/Immunologic: No nasal congestion or hives.     Physical Examination:    Vitals:    01/18/23 1127   BP: 120/60   Site: Right Upper Arm   Position: Sitting   Cuff Size: Small Adult   Pulse: 70   SpO2: 98%   Weight: 87 lb (39.5 kg)   Height: 4' 10\" (1.473 m)        Constitutional and General Appearance: Warm and dry, no apparent distress, normal coloration  HEENT:  Normocephalic, atraumatic, very thin  Respiratory:  Normal excursion and expansion without use of accessory muscles  Resp Auscultation: Normal breath sounds without dullness  Cardiovascular: The apical impulses not displaced  Heart tones are crisp and normal  JVP normal cm H2O  Regular rate and rhythm  Peripheral pulses are symmetrical and full  There is no clubbing, cyanosis of the extremities.   no edema  Pedal Pulses: 2+ and equal   Abdomen:  No masses or tenderness  Liver/Spleen: No Abnormalities Noted  Neurological/Psychiatric:  Alert and oriented in all spheres  Moves all extremities well  Exhibits normal gait balance and coordination  No abnormalities of mood, affect, memory, mentation, or behavior are noted    Lab Data:    CBC:   Lab Results   Component Value Date/Time    WBC 7.5 01/05/2023 11:45 AM    WBC 7.3 08/14/2022 06:17 AM    WBC 8.3 08/13/2022 04:46 AM    RBC 4.02 01/05/2023 11:45 AM    RBC 3.61 08/14/2022 06:17 AM    RBC 3.68 08/13/2022 04:46 AM    HGB 12.0 01/05/2023 11:45 AM    HGB 11.2 08/14/2022 06:17 AM    HGB 11.4 08/13/2022 04:46 AM    HCT 36.8 01/05/2023 11:45 AM    HCT 33.0 08/14/2022 06:17 AM    HCT 34.3 08/13/2022 04:46 AM    MCV 91.6 01/05/2023 11:45 AM    MCV 91.3 08/14/2022 06:17 AM    MCV 93.0 08/13/2022 04:46 AM    RDW 12.2 01/05/2023 11:45 AM    RDW 12.8 08/14/2022 06:17 AM    RDW 13.1 08/13/2022 04:46 AM     01/05/2023 11:45 AM     08/14/2022 06:17 AM     08/13/2022 04:46 AM     BMP:  Lab Results   Component Value Date/Time     01/05/2023 12:05 PM     11/17/2022 03:26 PM     10/05/2022 11:48 AM    K 4.8 01/05/2023 12:05 PM    K 5.1 11/17/2022 03:26 PM    K 5.2 10/05/2022 11:48 AM    K 4.6 08/10/2022 06:50 PM     01/05/2023 12:05 PM     11/17/2022 03:26 PM     10/05/2022 11:48 AM    CO2 24 01/05/2023 12:05 PM    CO2 24 11/17/2022 03:26 PM    CO2 22 10/05/2022 11:48 AM BUN 30 01/05/2023 12:05 PM    BUN 23 11/17/2022 03:26 PM    BUN 24 10/05/2022 11:48 AM    CREATININE 0.9 01/05/2023 12:05 PM    CREATININE 1.1 11/17/2022 03:26 PM    CREATININE 0.8 10/05/2022 11:48 AM     BNP:   Lab Results   Component Value Date/Time    PROBNP 6,911 11/17/2022 03:26 PM    PROBNP 3,724 10/05/2022 11:48 AM    PROBNP 4,780 09/06/2022 10:30 AM     Cardiac Imaging:    Cardiac Cath PCI: 8/10/2022  Dr Aniyah Mccullough  Anatomy:   LM-nml   LAD-mid 95%  Cx-nml  OM- nml  RCA-dom nml  RPDA- nml  LVEDP- 1     Intervention  ~Successful PCI to LAD with 2.25x18 ELIAS. Excellent Result. Contrast: 20  Flouro Time: 3.7  Access: R radial a     Impression  ~Coronary Angiography w/ severe single vessel CAD  ~Successful complex angioplasty and stenting of LAD    Recommendation  ~Aggressive medical treatment and risk factor modification  ~1. Post cath IVF. Bedrest.   2. Recommend beta blocker, ace/arb, high potency statin, aspirin and plavix for 6-12 months. 3. Referral to outpatient cardiac rehab phase II will be deferred until patient follow-up in office and then determine patient safety and appropriateness to proceed  4. Patient has been advised on the importance of regular exercise of at least 20-30 minutes daily. 5. Patient counseled about and offered assistance for smoking cessation   6. Follow up in 2 weeks with cardiology    Echo 8/11/2022  Summary   -Overall left ventricular systolic function appears severely reduced.   -Ejection fraction is visually estimated to be 15-20%. -There is severe global hypokinesis with varying regional wall motion   abnormalities. -Grade II diastolic dysfunction with elevated LV filling pressures. e/e' =   16.8.   -Aortic valve appears sclerotic but opens adequately.   -Thickened mitral valve leaflets. -Mild mitral regurgitation.   -Trivial tricuspid regurgitation.   -Trivial pericardial effusion. Assessment:    1.  Chronic systolic heart failure (HCC) on arb, bb and sglt2; no aldosterone antagonist due to hyperkalemia   2. Coronary artery disease due to calcified coronary lesion    3.  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    THANG Ruiz - CNS, CNS, 1/18/2023, 12:08 PM

## 2023-01-18 NOTE — PATIENT INSTRUCTIONS
Phase III cardiac rehab referral sent  Schedule an echo  Continue all current medications  RTO in 2 month  If squeeze is not >35% then will need to see electrophysiology for ICD

## 2023-01-20 ENCOUNTER — HOSPITAL ENCOUNTER (OUTPATIENT)
Dept: CARDIAC REHAB | Age: 78
Setting detail: THERAPIES SERIES
Discharge: HOME | End: 2023-01-20
Payer: MEDICARE

## 2023-01-20 ENCOUNTER — APPOINTMENT (OUTPATIENT)
Dept: CARDIAC REHAB | Age: 78
End: 2023-01-20
Payer: MEDICARE

## 2023-01-20 PROCEDURE — 93798 PHYS/QHP OP CAR RHAB W/ECG: CPT

## 2023-01-23 ENCOUNTER — APPOINTMENT (OUTPATIENT)
Dept: CARDIAC REHAB | Age: 78
End: 2023-01-23
Payer: MEDICARE

## 2023-01-23 ENCOUNTER — HOSPITAL ENCOUNTER (OUTPATIENT)
Dept: CARDIAC REHAB | Age: 78
Setting detail: THERAPIES SERIES
Discharge: HOME | End: 2023-01-23
Payer: MEDICARE

## 2023-01-23 ENCOUNTER — HOSPITAL ENCOUNTER (OUTPATIENT)
Dept: GENERAL RADIOLOGY | Age: 78
Discharge: HOME | End: 2023-01-23
Payer: MEDICARE

## 2023-01-23 ENCOUNTER — APPOINTMENT (OUTPATIENT)
Dept: GENERAL RADIOLOGY | Age: 78
End: 2023-01-23
Payer: MEDICARE

## 2023-01-23 DIAGNOSIS — E11.65 TYPE 2 DIABETES MELLITUS WITH HYPERGLYCEMIA, UNSPECIFIED WHETHER LONG TERM INSULIN USE (HCC): ICD-10-CM

## 2023-01-23 DIAGNOSIS — Z78.0 POST-MENOPAUSAL: ICD-10-CM

## 2023-01-23 PROCEDURE — 93798 PHYS/QHP OP CAR RHAB W/ECG: CPT

## 2023-01-23 PROCEDURE — 77080 DXA BONE DENSITY AXIAL: CPT

## 2023-01-25 ENCOUNTER — APPOINTMENT (OUTPATIENT)
Dept: CARDIAC REHAB | Age: 78
End: 2023-01-25
Payer: MEDICARE

## 2023-01-27 ENCOUNTER — APPOINTMENT (OUTPATIENT)
Dept: CARDIAC REHAB | Age: 78
End: 2023-01-27
Payer: MEDICARE

## 2023-01-30 ENCOUNTER — APPOINTMENT (OUTPATIENT)
Dept: CARDIAC REHAB | Age: 78
End: 2023-01-30
Payer: MEDICARE

## 2023-03-07 ENCOUNTER — TELEPHONE (OUTPATIENT)
Dept: PHARMACY | Facility: CLINIC | Age: 78
End: 2023-03-07

## 2023-03-07 NOTE — LETTER
[facility]  [facility]  [address] [place]  [address]        Hao Gooden  [address] [facility] [address]        03/08/23    Dear Hao Gooden    We tried to reach you recently regarding your Jardiance & Rosuvastatin prescriptions, but were unable to reach you on the telephone.     We are worried you might be missing doses or not taking it as directed. It is important that you take your medications regularly and try not to miss a single dose.    Some ways to help you remember to take and refill your medications are to:  · Use a pill box, set an alarm, and/or keep your medication near something that you do every day  · Fill a 3-month supply.  Your insurance company does cover a 100 day supply for a $0 copay of Rosuvastatin, to save you time and trips to the pharmacy. - if you would like assistance in switching your prescriptions to a 3-month supply, please contact us.  · Ask your pharmacy if you can be set up with automatic refill, where they will automatically refill your prescription when it is due and let you know it's ready to     Sincerely,       Bradly Cazares [facility]  Phone: toll free [phone] Option 1

## 2023-03-07 NOTE — TELEPHONE ENCOUNTER
[facility] CLINICAL PHARMACY: ADHERENCE REVIEW  Identified care gap per [employer]: fills at [facility]: Statin adherence    Last Visit: 01.04.23    Patient also appears to be prescribed: Jardiance         ASSESSMENT  DIABETES ADHERENCE      Per  [employer] Portal:  Nannette Bergeron last filled on 01.05.23 for 30 day supply. Lab Results   Component Value Date    LABA1C 7.9 01/05/2023    LABA1C 10.0 08/11/2022    LABA1C 10.1 08/11/2022     NOTE A1c <9%    STATIN ADHERENCE    Insurance Records claims through 02.20.23 (Prior Year [place] = PASSED; YTD [place] = Filled only once; Potential Fail Date: 04.17.23 ):   Rosuvastatin last filled on 01.05.23 for 30 day supply. Next refill due: 02.04.23    Per  [employer] Portal:  (same as above). Lab Results   Component Value Date    CHOL 165 01/05/2023    TRIG 66 01/05/2023    HDL 62 (H) 01/05/2023    LDLCALC 90 01/05/2023     ALT   Date Value Ref Range Status   01/05/2023 26 10 - 40 U/L Final     AST   Date Value Ref Range Status   01/05/2023 30 15 - 37 U/L Final     The ASCVD Risk score (Gutierrez DK, et al., 2019) failed to calculate for the following reasons:    Unable to determine if patient is Non-      PLAN  The following are interventions that have been identified:   - Patient overdue refilling Jardiance & Rosuvastatin and active on home medication list.    - Rosuvastatin eligible for 100 day supply $0, per [employer]. Attempting to reach patient to review. Left message asking for return call.          Future Appointments   Date Time Provider Lam Rivas   3/20/2023  2:00 PM ECHO ROOM 1 [facility] ECHO [facility]   3/20/2023  3:00 PM THANG Chavez - CNS FF Cardio MMA   6/14/2023  8:40 AM Indio Abebe MD FF ENDO MMA       [facility]  [address] [place] [address],[address] Floor Clinical Pharmacy  Phone: toll free [phone]

## 2023-03-08 NOTE — TELEPHONE ENCOUNTER
CLINICAL PHARMACY: ADHERENCE REVIEW    2nd Attempt Documentation:   Left message on voicemail for patient to call us back.    Letter mailed to home address.      ===================================================================    For Pharmacy Admin Tracking Only    Program: [address] in place:  No  Gap Closed?: No   Time Spent (min): 15

## 2023-03-20 ENCOUNTER — HOSPITAL ENCOUNTER (OUTPATIENT)
Dept: NON INVASIVE DIAGNOSTICS | Age: 78
Discharge: HOME | End: 2023-03-20
Payer: MEDICARE

## 2023-03-20 ENCOUNTER — OFFICE VISIT (OUTPATIENT)
Dept: CARDIOLOGY CLINIC | Age: 78
End: 2023-03-20
Payer: MEDICARE

## 2023-03-20 VITALS
DIASTOLIC BLOOD PRESSURE: 54 MMHG | HEART RATE: 64 BPM | SYSTOLIC BLOOD PRESSURE: 136 MMHG | HEIGHT: 58 IN | OXYGEN SATURATION: 99 % | BODY MASS INDEX: 18.52 KG/M2 | WEIGHT: 88.2 LBS

## 2023-03-20 DIAGNOSIS — I50.22 CHRONIC SYSTOLIC HEART FAILURE (HCC): ICD-10-CM

## 2023-03-20 DIAGNOSIS — E10.59 TYPE 1 DIABETES MELLITUS WITH OTHER CIRCULATORY COMPLICATION (HCC): ICD-10-CM

## 2023-03-20 DIAGNOSIS — I50.22 CHRONIC SYSTOLIC HEART FAILURE (HCC): Primary | ICD-10-CM

## 2023-03-20 DIAGNOSIS — I25.10 CORONARY ARTERY DISEASE DUE TO CALCIFIED CORONARY LESION: ICD-10-CM

## 2023-03-20 DIAGNOSIS — I25.84 CORONARY ARTERY DISEASE DUE TO CALCIFIED CORONARY LESION: ICD-10-CM

## 2023-03-20 LAB
LV EF: 43 %
LVEF MODALITY: NORMAL

## 2023-03-20 PROCEDURE — 93308 TTE F-UP OR LMTD: CPT

## 2023-03-20 PROCEDURE — 99214 OFFICE O/P EST MOD 30 MIN: CPT | Performed by: CLINICAL NURSE SPECIALIST

## 2023-03-20 PROCEDURE — 3051F HG A1C>EQUAL 7.0%<8.0%: CPT | Performed by: CLINICAL NURSE SPECIALIST

## 2023-03-20 PROCEDURE — 1123F ACP DISCUSS/DSCN MKR DOCD: CPT | Performed by: CLINICAL NURSE SPECIALIST

## 2023-03-20 NOTE — PROGRESS NOTES
MD Giulia   insulin glargine (LANTUS SOLOSTAR) 100 UNIT/ML injection pen Inject 10 Units into the skin nightly 1/4/23  Yes Michelle Bailey MD   therapeutic multivitamin-minerals (THERAGRAN-M) tablet Take 1 tablet by mouth daily. Yes Historical Provider, MD   aspirin EC 81 MG EC tablet Take 1 tablet by mouth daily  Patient not taking: Reported on 3/20/2023 1/5/23   Michelle Bailey MD   carvedilol (COREG) 3.125 MG tablet Take 1 tablet by mouth in the morning and 1 tablet in the evening. Take with meals. 8/14/22 8/14/22  Rubia Pugh MD        Allergies:  Codeine     Review of Systems:   Constitutional: there has been no unanticipated weight loss. There's been no change in energy level, sleep pattern, or activity level. Eyes: No visual changes or diplopia. No scleral icterus. ENT: No Headaches, hearing loss or vertigo. No mouth sores or sore throat. Cardiovascular: Reviewed in HPI  Respiratory: No cough or wheezing, no sputum production. No hematemesis. Gastrointestinal: No abdominal pain, appetite loss, blood in stools. No change in bowel or bladder habits. Genitourinary: No dysuria, trouble voiding, or hematuria. Musculoskeletal:  No gait disturbance, weakness or joint complaints. Integumentary: No rash or pruritis. Neurological: No headache, diplopia, change in muscle strength, numbness or tingling. No change in gait, balance, coordination, mood, affect, memory, mentation, behavior. Psychiatric: No anxiety, no depression. Endocrine: No malaise, fatigue or temperature intolerance. No excessive thirst, fluid intake, or urination. No tremor. Hematologic/Lymphatic: No abnormal bruising or bleeding, blood clots or swollen lymph nodes. Allergic/Immunologic: No nasal congestion or hives.     Physical Examination:    Vitals:    03/20/23 1458   BP: (!) 136/54   Site: Right Upper Arm   Position: Sitting   Cuff Size: Medium Adult   Pulse: 64   SpO2: 99%   Weight: 88 lb 3.2 oz (40 kg)   Height: 4'

## 2023-03-20 NOTE — PATIENT INSTRUCTIONS
Increase entresto to full tablet twice a day   Continue all other medications   Blood work in 1 month  I will let you know echo results once read  RTO in 4 months

## 2023-03-22 ENCOUNTER — TELEPHONE (OUTPATIENT)
Dept: CARDIOLOGY CLINIC | Age: 78
End: 2023-03-22

## 2023-03-22 NOTE — TELEPHONE ENCOUNTER
----- Message from THANG Valverde sent at 3/21/2023  8:36 AM EDT -----  Bhupendra Vaughn her squeeze on her heart is better 40-45%  Continue current medication   Make sure she increased her entresto like I recommended yesterday  thanks

## 2023-03-23 NOTE — TELEPHONE ENCOUNTER
Patient called into the office, call was disconnected tried calling patient back [facility] for her to return our call. Physical Therapy Daily Progress Note  Visit: 8    Nathan Baez reports: I am doing ok today    Subjective     Objective   See Exercise, Manual, and Modality Logs for complete treatment.       Assessment & Plan     Assessment  Assessment details: Pt is doing excellent. His posture with gait and exercises is significantly better    Plan  Plan details: Progress per POC. Reassess next few visits        Manual Therapy:    8     mins  92117;  Therapeutic Exercise:    25     mins  03820;     Neuromuscular Jon:    10    mins  46987;    Therapeutic Activity:     -     mins  98021;     Gait Training:      -     mins  19697;     Ultrasound:     -     mins  87544;    Electrical Stimulation:    15     mins  25277 ( );  Dry Needling     -     mins self-pay    Timed Treatment:   43   mins   Total Treatment:     60   mins    Elizabeth Chavez PT  [place] License #: [id number]    Physical Therapist

## 2023-03-23 NOTE — TELEPHONE ENCOUNTER
Tried to reach patient [facility] for her to return our call for her echo results.

## 2023-04-14 ENCOUNTER — TELEPHONE (OUTPATIENT)
Dept: PHARMACY | Facility: CLINIC | Age: 78
End: 2023-04-14

## 2023-05-31 ENCOUNTER — HOSPITAL ENCOUNTER (OUTPATIENT)
Age: 78
Discharge: HOME | End: 2023-05-31
Payer: MEDICARE

## 2023-05-31 DIAGNOSIS — R09.89 BILATERAL CAROTID BRUITS: ICD-10-CM

## 2023-05-31 DIAGNOSIS — I50.9 CONGESTIVE HEART FAILURE, UNSPECIFIED HF CHRONICITY, UNSPECIFIED HEART FAILURE TYPE (HCC): ICD-10-CM

## 2023-05-31 DIAGNOSIS — E11.65 TYPE 2 DIABETES MELLITUS WITH HYPERGLYCEMIA, UNSPECIFIED WHETHER LONG TERM INSULIN USE (HCC): ICD-10-CM

## 2023-05-31 LAB
ALBUMIN SERPL-MCNC: 4 G/DL (ref 3.4–5)
ALBUMIN/GLOB SERPL: 1.4 {RATIO} (ref 1.1–2.2)
ALP SERPL-CCNC: 103 U/L (ref 40–129)
ALT SERPL-CCNC: 12 U/L (ref 10–40)
ANION GAP SERPL CALCULATED.3IONS-SCNC: 9 MMOL/L (ref 3–16)
AST SERPL-CCNC: 20 U/L (ref 15–37)
BASOPHILS # BLD: 0.1 K/UL (ref 0–0.2)
BASOPHILS NFR BLD: 0.9 %
BILIRUB SERPL-MCNC: <0.2 MG/DL (ref 0–1)
BUN SERPL-MCNC: 27 MG/DL (ref 7–20)
CALCIUM SERPL-MCNC: 9.1 MG/DL (ref 8.3–10.6)
CHLORIDE SERPL-SCNC: 107 MMOL/L (ref 99–110)
CHOLEST SERPL-MCNC: 177 MG/DL (ref 0–199)
CO2 SERPL-SCNC: 25 MMOL/L (ref 21–32)
CREAT SERPL-MCNC: 1 MG/DL (ref 0.6–1.2)
DEPRECATED RDW RBC AUTO: 12.7 % (ref 12.4–15.4)
EOSINOPHIL # BLD: 0.2 K/UL (ref 0–0.6)
EOSINOPHIL NFR BLD: 2.8 %
FOLATE SERPL-MCNC: >20 NG/ML (ref 4.78–24.2)
GFR SERPLBLD CREATININE-BSD FMLA CKD-EPI: 58 ML/MIN/{1.73_M2}
GLUCOSE SERPL-MCNC: 86 MG/DL (ref 70–99)
HCT VFR BLD AUTO: 33 % (ref 36–48)
HDLC SERPL-MCNC: 55 MG/DL (ref 40–60)
HGB BLD-MCNC: 11.2 G/DL (ref 12–16)
LDLC SERPL CALC-MCNC: 105 MG/DL
LYMPHOCYTES # BLD: 1.3 K/UL (ref 1–5.1)
LYMPHOCYTES NFR BLD: 17.6 %
MCH RBC QN AUTO: 31.3 PG (ref 26–34)
MCHC RBC AUTO-ENTMCNC: 33.9 G/DL (ref 31–36)
MCV RBC AUTO: 92.3 FL (ref 80–100)
MONOCYTES # BLD: 0.5 K/UL (ref 0–1.3)
MONOCYTES NFR BLD: 7.3 %
NEUTROPHILS # BLD: 5.1 K/UL (ref 1.7–7.7)
NEUTROPHILS NFR BLD: 71.4 %
NT-PROBNP SERPL-MCNC: 1319 PG/ML (ref 0–449)
PLATELET # BLD AUTO: 314 K/UL (ref 135–450)
PMV BLD AUTO: 8.4 FL (ref 5–10.5)
POTASSIUM SERPL-SCNC: 5.2 MMOL/L (ref 3.5–5.1)
PROT SERPL-MCNC: 6.8 G/DL (ref 6.4–8.2)
RBC # BLD AUTO: 3.57 M/UL (ref 4–5.2)
SODIUM SERPL-SCNC: 141 MMOL/L (ref 136–145)
T4 FREE SERPL-MCNC: 1.1 NG/DL (ref 0.9–1.8)
TRIGL SERPL-MCNC: 84 MG/DL (ref 0–150)
TSH SERPL DL<=0.005 MIU/L-ACNC: 9.37 UIU/ML (ref 0.27–4.2)
VIT B12 SERPL-MCNC: 534 PG/ML (ref 211–911)
VLDLC SERPL CALC-MCNC: 17 MG/DL
WBC # BLD AUTO: 7.2 K/UL (ref 4–11)

## 2023-05-31 PROCEDURE — 83036 HEMOGLOBIN GLYCOSYLATED A1C: CPT

## 2023-05-31 PROCEDURE — 80061 LIPID PANEL: CPT

## 2023-05-31 PROCEDURE — 83880 ASSAY OF NATRIURETIC PEPTIDE: CPT

## 2023-05-31 PROCEDURE — 84439 ASSAY OF FREE THYROXINE: CPT

## 2023-05-31 PROCEDURE — 85025 COMPLETE CBC W/AUTO DIFF WBC: CPT

## 2023-05-31 PROCEDURE — 82607 VITAMIN B-12: CPT

## 2023-05-31 PROCEDURE — 36415 COLL VENOUS BLD VENIPUNCTURE: CPT

## 2023-05-31 PROCEDURE — 84443 ASSAY THYROID STIM HORMONE: CPT

## 2023-05-31 PROCEDURE — 80053 COMPREHEN METABOLIC PANEL: CPT

## 2023-05-31 PROCEDURE — 82746 ASSAY OF FOLIC ACID SERUM: CPT

## 2023-06-01 PROBLEM — E03.9 HYPOTHYROIDISM: Status: ACTIVE | Noted: 2023-06-01

## 2023-06-01 PROBLEM — M81.0 OSTEOPOROSIS: Status: ACTIVE | Noted: 2023-06-01

## 2023-06-01 PROBLEM — R09.89 BILATERAL CAROTID BRUITS: Status: ACTIVE | Noted: 2023-06-01

## 2023-06-01 LAB
EST. AVERAGE GLUCOSE BLD GHB EST-MCNC: 200.1 MG/DL
HBA1C MFR BLD: 8.6 %

## 2023-09-26 ENCOUNTER — HOSPITAL ENCOUNTER (OUTPATIENT)
Age: 78
Discharge: HOME | End: 2023-09-26
Payer: MEDICARE

## 2023-09-26 ENCOUNTER — HOSPITAL ENCOUNTER (INPATIENT)
Age: 78
LOS: 3 days | Discharge: HOME | DRG: 246 | End: 2023-09-30
Attending: STUDENT IN AN ORGANIZED HEALTH CARE EDUCATION/TRAINING PROGRAM | Admitting: STUDENT IN AN ORGANIZED HEALTH CARE EDUCATION/TRAINING PROGRAM
Payer: MEDICARE

## 2023-09-26 ENCOUNTER — APPOINTMENT (OUTPATIENT)
Dept: GENERAL RADIOLOGY | Age: 78
DRG: 246 | End: 2023-09-26
Payer: MEDICARE

## 2023-09-26 DIAGNOSIS — R30.0 DYSURIA: ICD-10-CM

## 2023-09-26 DIAGNOSIS — E03.9 HYPOTHYROIDISM, UNSPECIFIED TYPE: ICD-10-CM

## 2023-09-26 DIAGNOSIS — I50.9 CONGESTIVE HEART FAILURE, UNSPECIFIED HF CHRONICITY, UNSPECIFIED HEART FAILURE TYPE (HCC): ICD-10-CM

## 2023-09-26 DIAGNOSIS — R73.9 HYPERGLYCEMIA, UNSPECIFIED: ICD-10-CM

## 2023-09-26 DIAGNOSIS — E11.65 TYPE 2 DIABETES MELLITUS WITH HYPERGLYCEMIA, UNSPECIFIED WHETHER LONG TERM INSULIN USE (HCC): ICD-10-CM

## 2023-09-26 DIAGNOSIS — I50.9 ACUTE ON CHRONIC CONGESTIVE HEART FAILURE, UNSPECIFIED HEART FAILURE TYPE (HCC): Primary | ICD-10-CM

## 2023-09-26 LAB
ALBUMIN SERPL-MCNC: 3.8 G/DL (ref 3.4–5)
ALBUMIN SERPL-MCNC: 3.9 G/DL (ref 3.4–5)
ALBUMIN/GLOB SERPL: 1.1 {RATIO} (ref 1.1–2.2)
ALBUMIN/GLOB SERPL: 1.2 {RATIO} (ref 1.1–2.2)
ALP SERPL-CCNC: 131 U/L (ref 40–129)
ALP SERPL-CCNC: 133 U/L (ref 40–129)
ALT SERPL-CCNC: 14 U/L (ref 10–40)
ALT SERPL-CCNC: 16 U/L (ref 10–40)
ANION GAP SERPL CALCULATED.3IONS-SCNC: 11 MMOL/L (ref 3–16)
ANION GAP SERPL CALCULATED.3IONS-SCNC: 14 MMOL/L (ref 3–16)
AST SERPL-CCNC: 15 U/L (ref 15–37)
AST SERPL-CCNC: 17 U/L (ref 15–37)
BACTERIA URNS QL MICRO: ABNORMAL /HPF
BACTERIA URNS QL MICRO: ABNORMAL /HPF
BASOPHILS # BLD: 0.1 K/UL (ref 0–0.2)
BASOPHILS # BLD: 0.1 K/UL (ref 0–0.2)
BASOPHILS NFR BLD: 0.9 %
BASOPHILS NFR BLD: 1 %
BILIRUB SERPL-MCNC: 0.4 MG/DL (ref 0–1)
BILIRUB SERPL-MCNC: 0.5 MG/DL (ref 0–1)
BILIRUB UR QL STRIP.AUTO: NEGATIVE
BILIRUB UR QL STRIP.AUTO: NEGATIVE
BUN SERPL-MCNC: 21 MG/DL (ref 7–20)
BUN SERPL-MCNC: 30 MG/DL (ref 7–20)
CALCIUM SERPL-MCNC: 8.7 MG/DL (ref 8.3–10.6)
CALCIUM SERPL-MCNC: 9.2 MG/DL (ref 8.3–10.6)
CHLORIDE SERPL-SCNC: 93 MMOL/L (ref 99–110)
CHLORIDE SERPL-SCNC: 93 MMOL/L (ref 99–110)
CHOLEST SERPL-MCNC: 158 MG/DL (ref 0–199)
CLARITY UR: CLEAR
CLARITY UR: CLEAR
CO2 SERPL-SCNC: 21 MMOL/L (ref 21–32)
CO2 SERPL-SCNC: 23 MMOL/L (ref 21–32)
COLOR UR: YELLOW
COLOR UR: YELLOW
CREAT SERPL-MCNC: 1 MG/DL (ref 0.6–1.2)
CREAT SERPL-MCNC: 1 MG/DL (ref 0.6–1.2)
D DIMER: 0.56 UG/ML FEU (ref 0–0.6)
DEPRECATED RDW RBC AUTO: 12.4 % (ref 12.4–15.4)
DEPRECATED RDW RBC AUTO: 12.5 % (ref 12.4–15.4)
EOSINOPHIL # BLD: 0.3 K/UL (ref 0–0.6)
EOSINOPHIL # BLD: 0.3 K/UL (ref 0–0.6)
EOSINOPHIL NFR BLD: 2.9 %
EOSINOPHIL NFR BLD: 4.7 %
EPI CELLS #/AREA URNS AUTO: 2 /HPF (ref 0–5)
EPI CELLS #/AREA URNS AUTO: 2 /HPF (ref 0–5)
ERYTHROCYTE [SEDIMENTATION RATE] IN BLOOD BY WESTERGREN METHOD: 40 MM/HR (ref 0–30)
FOLATE SERPL-MCNC: >20 NG/ML (ref 4.78–24.2)
GFR SERPLBLD CREATININE-BSD FMLA CKD-EPI: 58 ML/MIN/{1.73_M2}
GFR SERPLBLD CREATININE-BSD FMLA CKD-EPI: 58 ML/MIN/{1.73_M2}
GLUCOSE SERPL-MCNC: 448 MG/DL (ref 70–99)
GLUCOSE SERPL-MCNC: 694 MG/DL (ref 70–99)
GLUCOSE UR STRIP.AUTO-MCNC: >=1000 MG/DL
GLUCOSE UR STRIP.AUTO-MCNC: >=1000 MG/DL
HCT VFR BLD AUTO: 32.5 % (ref 36–48)
HCT VFR BLD AUTO: 32.8 % (ref 36–48)
HDLC SERPL-MCNC: 55 MG/DL (ref 40–60)
HGB BLD-MCNC: 10.8 G/DL (ref 12–16)
HGB BLD-MCNC: 11.1 G/DL (ref 12–16)
HGB UR QL STRIP.AUTO: ABNORMAL
HGB UR QL STRIP.AUTO: ABNORMAL
HYALINE CASTS #/AREA URNS AUTO: 1 /LPF (ref 0–8)
HYALINE CASTS #/AREA URNS AUTO: 1 /LPF (ref 0–8)
INR PPP: 0.98 (ref 0.84–1.16)
KETONES UR STRIP.AUTO-MCNC: NEGATIVE MG/DL
KETONES UR STRIP.AUTO-MCNC: NEGATIVE MG/DL
LDLC SERPL CALC-MCNC: 87 MG/DL
LEUKOCYTE ESTERASE UR QL STRIP.AUTO: ABNORMAL
LEUKOCYTE ESTERASE UR QL STRIP.AUTO: ABNORMAL
LIPASE SERPL-CCNC: 46 U/L (ref 13–60)
LYMPHOCYTES # BLD: 1.3 K/UL (ref 1–5.1)
LYMPHOCYTES # BLD: 1.3 K/UL (ref 1–5.1)
LYMPHOCYTES NFR BLD: 15.3 %
LYMPHOCYTES NFR BLD: 18.5 %
MCH RBC QN AUTO: 30.2 PG (ref 26–34)
MCH RBC QN AUTO: 31.1 PG (ref 26–34)
MCHC RBC AUTO-ENTMCNC: 33.2 G/DL (ref 31–36)
MCHC RBC AUTO-ENTMCNC: 33.9 G/DL (ref 31–36)
MCV RBC AUTO: 91 FL (ref 80–100)
MCV RBC AUTO: 91.6 FL (ref 80–100)
MONOCYTES # BLD: 0.6 K/UL (ref 0–1.3)
MONOCYTES # BLD: 0.6 K/UL (ref 0–1.3)
MONOCYTES NFR BLD: 6.5 %
MONOCYTES NFR BLD: 7.9 %
NEUTROPHILS # BLD: 4.9 K/UL (ref 1.7–7.7)
NEUTROPHILS # BLD: 6.5 K/UL (ref 1.7–7.7)
NEUTROPHILS NFR BLD: 68 %
NEUTROPHILS NFR BLD: 74.3 %
NITRITE UR QL STRIP.AUTO: NEGATIVE
NITRITE UR QL STRIP.AUTO: NEGATIVE
NT-PROBNP SERPL-MCNC: 8294 PG/ML (ref 0–449)
NT-PROBNP SERPL-MCNC: ABNORMAL PG/ML (ref 0–449)
PH UR STRIP.AUTO: 6.5 [PH] (ref 5–8)
PH UR STRIP.AUTO: 6.5 [PH] (ref 5–8)
PLATELET # BLD AUTO: 249 K/UL (ref 135–450)
PLATELET # BLD AUTO: 252 K/UL (ref 135–450)
PMV BLD AUTO: 8.6 FL (ref 5–10.5)
PMV BLD AUTO: 8.9 FL (ref 5–10.5)
POTASSIUM SERPL-SCNC: 4.4 MMOL/L (ref 3.5–5.1)
POTASSIUM SERPL-SCNC: 5.1 MMOL/L (ref 3.5–5.1)
PROT SERPL-MCNC: 7 G/DL (ref 6.4–8.2)
PROT SERPL-MCNC: 7.3 G/DL (ref 6.4–8.2)
PROT UR STRIP.AUTO-MCNC: 30 MG/DL
PROT UR STRIP.AUTO-MCNC: 30 MG/DL
PROTHROMBIN TIME: 13 SEC (ref 11.5–14.8)
RBC # BLD AUTO: 3.57 M/UL (ref 4–5.2)
RBC # BLD AUTO: 3.58 M/UL (ref 4–5.2)
RBC CLUMPS #/AREA URNS AUTO: 6 /HPF (ref 0–4)
RBC CLUMPS #/AREA URNS AUTO: 9 /HPF (ref 0–4)
SARS-COV-2 RDRP RESP QL NAA+PROBE: NOT DETECTED
SODIUM SERPL-SCNC: 127 MMOL/L (ref 136–145)
SODIUM SERPL-SCNC: 128 MMOL/L (ref 136–145)
SP GR UR STRIP.AUTO: 1.02 (ref 1–1.03)
SP GR UR STRIP.AUTO: >=1.03 (ref 1–1.03)
TRIGL SERPL-MCNC: 80 MG/DL (ref 0–150)
TROPONIN, HIGH SENSITIVITY: 18 NG/L (ref 0–14)
TSH SERPL DL<=0.005 MIU/L-ACNC: 3.87 UIU/ML (ref 0.27–4.2)
UA COMPLETE W REFLEX CULTURE PNL UR: YES
UA COMPLETE W REFLEX CULTURE PNL UR: YES
UA DIPSTICK W REFLEX MICRO PNL UR: YES
UA DIPSTICK W REFLEX MICRO PNL UR: YES
URN SPEC COLLECT METH UR: ABNORMAL
URN SPEC COLLECT METH UR: ABNORMAL
UROBILINOGEN UR STRIP-ACNC: 0.2 E.U./DL
UROBILINOGEN UR STRIP-ACNC: 0.2 E.U./DL
VIT B12 SERPL-MCNC: >2000 PG/ML (ref 211–911)
VLDLC SERPL CALC-MCNC: 16 MG/DL
WBC # BLD AUTO: 7.2 K/UL (ref 4–11)
WBC # BLD AUTO: 8.7 K/UL (ref 4–11)
WBC #/AREA URNS AUTO: 25 /HPF (ref 0–5)
WBC #/AREA URNS AUTO: 53 /HPF (ref 0–5)

## 2023-09-26 PROCEDURE — 87635 SARS-COV-2 COVID-19 AMP PRB: CPT

## 2023-09-26 PROCEDURE — 82746 ASSAY OF FOLIC ACID SERUM: CPT

## 2023-09-26 PROCEDURE — 84484 ASSAY OF TROPONIN QUANT: CPT

## 2023-09-26 PROCEDURE — 82010 KETONE BODYS QUAN: CPT

## 2023-09-26 PROCEDURE — 84443 ASSAY THYROID STIM HORMONE: CPT

## 2023-09-26 PROCEDURE — 36415 COLL VENOUS BLD VENIPUNCTURE: CPT

## 2023-09-26 PROCEDURE — 83036 HEMOGLOBIN GLYCOSYLATED A1C: CPT

## 2023-09-26 PROCEDURE — 85379 FIBRIN DEGRADATION QUANT: CPT

## 2023-09-26 PROCEDURE — 85652 RBC SED RATE AUTOMATED: CPT

## 2023-09-26 PROCEDURE — 80061 LIPID PANEL: CPT

## 2023-09-26 PROCEDURE — 83690 ASSAY OF LIPASE: CPT

## 2023-09-26 PROCEDURE — 85610 PROTHROMBIN TIME: CPT

## 2023-09-26 PROCEDURE — 83880 ASSAY OF NATRIURETIC PEPTIDE: CPT

## 2023-09-26 PROCEDURE — 87086 URINE CULTURE/COLONY COUNT: CPT

## 2023-09-26 PROCEDURE — 71045 X-RAY EXAM CHEST 1 VIEW: CPT

## 2023-09-26 PROCEDURE — 81001 URINALYSIS AUTO W/SCOPE: CPT

## 2023-09-26 PROCEDURE — 99285 EMERGENCY DEPT VISIT HI MDM: CPT

## 2023-09-26 PROCEDURE — 85025 COMPLETE CBC W/AUTO DIFF WBC: CPT

## 2023-09-26 PROCEDURE — 82803 BLOOD GASES ANY COMBINATION: CPT

## 2023-09-26 PROCEDURE — 80053 COMPREHEN METABOLIC PANEL: CPT

## 2023-09-26 PROCEDURE — 93005 ELECTROCARDIOGRAM TRACING: CPT | Performed by: STUDENT IN AN ORGANIZED HEALTH CARE EDUCATION/TRAINING PROGRAM

## 2023-09-26 PROCEDURE — 82607 VITAMIN B-12: CPT

## 2023-09-26 RX ORDER — INSULIN LISPRO 100 [IU]/ML
10 INJECTION, SOLUTION INTRAVENOUS; SUBCUTANEOUS ONCE
Status: COMPLETED | OUTPATIENT
Start: 2023-09-27 | End: 2023-09-27

## 2023-09-26 ASSESSMENT — PATIENT HEALTH QUESTIONNAIRE - PHQ9
SUM OF ALL RESPONSES TO PHQ QUESTIONS 1-9: 0
SUM OF ALL RESPONSES TO PHQ9 QUESTIONS 1 & 2: 0
1. LITTLE INTEREST OR PLEASURE IN DOING THINGS: 0
2. FEELING DOWN, DEPRESSED OR HOPELESS: 0
SUM OF ALL RESPONSES TO PHQ QUESTIONS 1-9: 0

## 2023-09-26 ASSESSMENT — PAIN - FUNCTIONAL ASSESSMENT
PAIN_FUNCTIONAL_ASSESSMENT: NONE - DENIES PAIN
PAIN_FUNCTIONAL_ASSESSMENT: 0-10

## 2023-09-26 ASSESSMENT — ENCOUNTER SYMPTOMS
DIARRHEA: 0
SHORTNESS OF BREATH: 1
CHEST TIGHTNESS: 0
COUGH: 1
RHINORRHEA: 0
VOMITING: 0
NAUSEA: 0
ABDOMINAL PAIN: 0

## 2023-09-26 ASSESSMENT — LIFESTYLE VARIABLES
HOW OFTEN DO YOU HAVE A DRINK CONTAINING ALCOHOL: NEVER
HOW MANY STANDARD DRINKS CONTAINING ALCOHOL DO YOU HAVE ON A TYPICAL DAY: PATIENT DOES NOT DRINK

## 2023-09-27 ENCOUNTER — APPOINTMENT (OUTPATIENT)
Dept: CT IMAGING | Age: 78
DRG: 246 | End: 2023-09-27
Payer: MEDICARE

## 2023-09-27 PROBLEM — R73.9 HYPERGLYCEMIA: Status: ACTIVE | Noted: 2023-09-27

## 2023-09-27 LAB
ANION GAP SERPL CALCULATED.3IONS-SCNC: 10 MMOL/L (ref 3–16)
BACTERIA UR CULT: NORMAL
BASE EXCESS BLDV CALC-SCNC: 0.6 MMOL/L (ref -3–3)
BASOPHILS # BLD: 0 K/UL (ref 0–0.2)
BASOPHILS NFR BLD: 0.6 %
BETA-HYDROXYBUTYRATE: 0.4 MMOL/L (ref 0–0.27)
BUN SERPL-MCNC: 28 MG/DL (ref 7–20)
CALCIUM SERPL-MCNC: 8.9 MG/DL (ref 8.3–10.6)
CHLORIDE SERPL-SCNC: 103 MMOL/L (ref 99–110)
CO2 BLDV-SCNC: 61 MMOL/L
CO2 SERPL-SCNC: 21 MMOL/L (ref 21–32)
COHGB MFR BLDV: 3.3 % (ref 0–1.5)
CREAT SERPL-MCNC: 0.9 MG/DL (ref 0.6–1.2)
DEPRECATED RDW RBC AUTO: 12.5 % (ref 12.4–15.4)
EKG ATRIAL RATE: 69 BPM
EKG ATRIAL RATE: 77 BPM
EKG DIAGNOSIS: NORMAL
EKG DIAGNOSIS: NORMAL
EKG P AXIS: 31 DEGREES
EKG P AXIS: 41 DEGREES
EKG P-R INTERVAL: 134 MS
EKG P-R INTERVAL: 144 MS
EKG Q-T INTERVAL: 398 MS
EKG Q-T INTERVAL: 436 MS
EKG QRS DURATION: 84 MS
EKG QRS DURATION: 88 MS
EKG QTC CALCULATION (BAZETT): 450 MS
EKG QTC CALCULATION (BAZETT): 467 MS
EKG R AXIS: 3 DEGREES
EKG R AXIS: 9 DEGREES
EKG T AXIS: 178 DEGREES
EKG T AXIS: 218 DEGREES
EKG VENTRICULAR RATE: 69 BPM
EKG VENTRICULAR RATE: 77 BPM
EOSINOPHIL # BLD: 0.2 K/UL (ref 0–0.6)
EOSINOPHIL NFR BLD: 2.3 %
EST. AVERAGE GLUCOSE BLD GHB EST-MCNC: 343.6 MG/DL
FERRITIN SERPL IA-MCNC: 118.6 NG/ML (ref 15–150)
GFR SERPLBLD CREATININE-BSD FMLA CKD-EPI: >60 ML/MIN/{1.73_M2}
GLUCOSE BLD-MCNC: 127 MG/DL (ref 70–99)
GLUCOSE BLD-MCNC: 174 MG/DL (ref 70–99)
GLUCOSE BLD-MCNC: 180 MG/DL (ref 70–99)
GLUCOSE BLD-MCNC: 336 MG/DL (ref 70–99)
GLUCOSE BLD-MCNC: 358 MG/DL (ref 70–99)
GLUCOSE BLD-MCNC: 456 MG/DL (ref 70–99)
GLUCOSE BLD-MCNC: 484 MG/DL (ref 70–99)
GLUCOSE BLD-MCNC: 587 MG/DL (ref 70–99)
GLUCOSE BLD-MCNC: 92 MG/DL (ref 70–99)
GLUCOSE SERPL-MCNC: 142 MG/DL (ref 70–99)
HBA1C MFR BLD: 13.6 %
HCO3 BLDV-SCNC: 25.8 MMOL/L (ref 23–29)
HCT VFR BLD AUTO: 29.3 % (ref 36–48)
HGB BLD-MCNC: 10.1 G/DL (ref 12–16)
IRON SATN MFR SERPL: 16 % (ref 15–50)
IRON SERPL-MCNC: 38 UG/DL (ref 37–145)
LYMPHOCYTES # BLD: 1 K/UL (ref 1–5.1)
LYMPHOCYTES NFR BLD: 12.6 %
MCH RBC QN AUTO: 30.8 PG (ref 26–34)
MCHC RBC AUTO-ENTMCNC: 34.4 G/DL (ref 31–36)
MCV RBC AUTO: 89.3 FL (ref 80–100)
METHGB MFR BLDV: 0.3 %
MONOCYTES # BLD: 0.6 K/UL (ref 0–1.3)
MONOCYTES NFR BLD: 8.1 %
NEUTROPHILS # BLD: 6 K/UL (ref 1.7–7.7)
NEUTROPHILS NFR BLD: 76.4 %
O2 CT VFR BLDV CALC: 14 VOL %
O2 THERAPY: ABNORMAL
PCO2 BLDV: 42.8 MMHG (ref 40–50)
PERFORMED ON: ABNORMAL
PERFORMED ON: NORMAL
PH BLDV: 7.39 [PH] (ref 7.35–7.45)
PLATELET # BLD AUTO: 252 K/UL (ref 135–450)
PMV BLD AUTO: 8.2 FL (ref 5–10.5)
PO2 BLDV: 163 MMHG (ref 25–40)
POTASSIUM SERPL-SCNC: 3.9 MMOL/L (ref 3.5–5.1)
RBC # BLD AUTO: 3.28 M/UL (ref 4–5.2)
SAO2 % BLDV: 100 %
SODIUM SERPL-SCNC: 134 MMOL/L (ref 136–145)
T4 FREE SERPL-MCNC: 1.3 NG/DL (ref 0.9–1.8)
TIBC SERPL-MCNC: 242 UG/DL (ref 260–445)
TROPONIN, HIGH SENSITIVITY: 16 NG/L (ref 0–14)
TSH SERPL DL<=0.005 MIU/L-ACNC: 4.92 UIU/ML (ref 0.27–4.2)
WBC # BLD AUTO: 7.9 K/UL (ref 4–11)

## 2023-09-27 PROCEDURE — 6360000002 HC RX W HCPCS: Performed by: INTERNAL MEDICINE

## 2023-09-27 PROCEDURE — 97530 THERAPEUTIC ACTIVITIES: CPT

## 2023-09-27 PROCEDURE — 97161 PT EVAL LOW COMPLEX 20 MIN: CPT

## 2023-09-27 PROCEDURE — 74177 CT ABD & PELVIS W/CONTRAST: CPT

## 2023-09-27 PROCEDURE — 85025 COMPLETE CBC W/AUTO DIFF WBC: CPT

## 2023-09-27 PROCEDURE — 80048 BASIC METABOLIC PNL TOTAL CA: CPT

## 2023-09-27 PROCEDURE — 83540 ASSAY OF IRON: CPT

## 2023-09-27 PROCEDURE — 97116 GAIT TRAINING THERAPY: CPT

## 2023-09-27 PROCEDURE — 6370000000 HC RX 637 (ALT 250 FOR IP): Performed by: STUDENT IN AN ORGANIZED HEALTH CARE EDUCATION/TRAINING PROGRAM

## 2023-09-27 PROCEDURE — 97165 OT EVAL LOW COMPLEX 30 MIN: CPT

## 2023-09-27 PROCEDURE — 93010 ELECTROCARDIOGRAM REPORT: CPT | Performed by: INTERNAL MEDICINE

## 2023-09-27 PROCEDURE — 83550 IRON BINDING TEST: CPT

## 2023-09-27 PROCEDURE — 6370000000 HC RX 637 (ALT 250 FOR IP): Performed by: NURSE PRACTITIONER

## 2023-09-27 PROCEDURE — 82728 ASSAY OF FERRITIN: CPT

## 2023-09-27 PROCEDURE — 6370000000 HC RX 637 (ALT 250 FOR IP): Performed by: INTERNAL MEDICINE

## 2023-09-27 PROCEDURE — 36415 COLL VENOUS BLD VENIPUNCTURE: CPT

## 2023-09-27 PROCEDURE — 2580000003 HC RX 258: Performed by: STUDENT IN AN ORGANIZED HEALTH CARE EDUCATION/TRAINING PROGRAM

## 2023-09-27 PROCEDURE — 1200000000 HC SEMI PRIVATE

## 2023-09-27 PROCEDURE — 99223 1ST HOSP IP/OBS HIGH 75: CPT | Performed by: INTERNAL MEDICINE

## 2023-09-27 PROCEDURE — 97535 SELF CARE MNGMENT TRAINING: CPT

## 2023-09-27 PROCEDURE — 84439 ASSAY OF FREE THYROXINE: CPT

## 2023-09-27 PROCEDURE — 84484 ASSAY OF TROPONIN QUANT: CPT

## 2023-09-27 PROCEDURE — 6360000002 HC RX W HCPCS: Performed by: STUDENT IN AN ORGANIZED HEALTH CARE EDUCATION/TRAINING PROGRAM

## 2023-09-27 PROCEDURE — C8929 TTE W OR WO FOL WCON,DOPPLER: HCPCS

## 2023-09-27 PROCEDURE — 84443 ASSAY THYROID STIM HORMONE: CPT

## 2023-09-27 PROCEDURE — 93005 ELECTROCARDIOGRAM TRACING: CPT | Performed by: HOSPITALIST

## 2023-09-27 PROCEDURE — 6360000004 HC RX CONTRAST MEDICATION: Performed by: NURSE PRACTITIONER

## 2023-09-27 PROCEDURE — 6360000004 HC RX CONTRAST MEDICATION: Performed by: INTERNAL MEDICINE

## 2023-09-27 RX ORDER — ACETAMINOPHEN 325 MG/1
650 TABLET ORAL EVERY 6 HOURS PRN
Status: DISCONTINUED | OUTPATIENT
Start: 2023-09-27 | End: 2023-09-28

## 2023-09-27 RX ORDER — ASPIRIN 81 MG/1
81 TABLET ORAL DAILY
Status: DISCONTINUED | OUTPATIENT
Start: 2023-09-27 | End: 2023-09-30 | Stop reason: HOSPADM

## 2023-09-27 RX ORDER — ONDANSETRON 4 MG/1
4 TABLET, ORALLY DISINTEGRATING ORAL EVERY 8 HOURS PRN
Status: DISCONTINUED | OUTPATIENT
Start: 2023-09-27 | End: 2023-09-30 | Stop reason: HOSPADM

## 2023-09-27 RX ORDER — POLYETHYLENE GLYCOL 3350 17 G/17G
17 POWDER, FOR SOLUTION ORAL DAILY PRN
Status: DISCONTINUED | OUTPATIENT
Start: 2023-09-27 | End: 2023-09-30 | Stop reason: HOSPADM

## 2023-09-27 RX ORDER — INSULIN GLARGINE 100 [IU]/ML
15 INJECTION, SOLUTION SUBCUTANEOUS NIGHTLY
Status: DISCONTINUED | OUTPATIENT
Start: 2023-09-27 | End: 2023-09-30 | Stop reason: HOSPADM

## 2023-09-27 RX ORDER — INSULIN GLARGINE 100 [IU]/ML
10 INJECTION, SOLUTION SUBCUTANEOUS ONCE
Status: COMPLETED | OUTPATIENT
Start: 2023-09-27 | End: 2023-09-27

## 2023-09-27 RX ORDER — HEPARIN SODIUM 5000 [USP'U]/ML
5000 INJECTION, SOLUTION INTRAVENOUS; SUBCUTANEOUS 2 TIMES DAILY
Status: DISCONTINUED | OUTPATIENT
Start: 2023-09-27 | End: 2023-09-30 | Stop reason: HOSPADM

## 2023-09-27 RX ORDER — INSULIN GLARGINE 100 [IU]/ML
10 INJECTION, SOLUTION SUBCUTANEOUS NIGHTLY
Status: ON HOLD | COMMUNITY
End: 2023-09-30 | Stop reason: HOSPADM

## 2023-09-27 RX ORDER — CLOPIDOGREL BISULFATE 75 MG/1
75 TABLET ORAL DAILY
Status: DISCONTINUED | OUTPATIENT
Start: 2023-09-27 | End: 2023-09-30 | Stop reason: HOSPADM

## 2023-09-27 RX ORDER — GLUCAGON 1 MG/ML
1 KIT INJECTION PRN
Status: DISCONTINUED | OUTPATIENT
Start: 2023-09-27 | End: 2023-09-30 | Stop reason: HOSPADM

## 2023-09-27 RX ORDER — SODIUM CHLORIDE 9 MG/ML
INJECTION, SOLUTION INTRAVENOUS CONTINUOUS
Status: DISCONTINUED | OUTPATIENT
Start: 2023-09-27 | End: 2023-09-27

## 2023-09-27 RX ORDER — INSULIN LISPRO 100 [IU]/ML
0-4 INJECTION, SOLUTION INTRAVENOUS; SUBCUTANEOUS NIGHTLY
Status: DISCONTINUED | OUTPATIENT
Start: 2023-09-27 | End: 2023-09-30 | Stop reason: HOSPADM

## 2023-09-27 RX ORDER — INSULIN LISPRO 100 [IU]/ML
8 INJECTION, SOLUTION INTRAVENOUS; SUBCUTANEOUS
Status: DISCONTINUED | OUTPATIENT
Start: 2023-09-27 | End: 2023-09-27

## 2023-09-27 RX ORDER — ROSUVASTATIN CALCIUM 20 MG/1
20 TABLET, COATED ORAL NIGHTLY
Status: DISCONTINUED | OUTPATIENT
Start: 2023-09-27 | End: 2023-09-30 | Stop reason: HOSPADM

## 2023-09-27 RX ORDER — LEVOTHYROXINE SODIUM 0.03 MG/1
50 TABLET ORAL
Status: DISCONTINUED | OUTPATIENT
Start: 2023-09-27 | End: 2023-09-30 | Stop reason: HOSPADM

## 2023-09-27 RX ORDER — FUROSEMIDE 10 MG/ML
40 INJECTION INTRAMUSCULAR; INTRAVENOUS 2 TIMES DAILY
Status: DISCONTINUED | OUTPATIENT
Start: 2023-09-27 | End: 2023-09-30 | Stop reason: HOSPADM

## 2023-09-27 RX ORDER — SODIUM CHLORIDE 0.9 % (FLUSH) 0.9 %
5-40 SYRINGE (ML) INJECTION EVERY 12 HOURS SCHEDULED
Status: DISCONTINUED | OUTPATIENT
Start: 2023-09-27 | End: 2023-09-30 | Stop reason: HOSPADM

## 2023-09-27 RX ORDER — SODIUM CHLORIDE 9 MG/ML
INJECTION, SOLUTION INTRAVENOUS PRN
Status: DISCONTINUED | OUTPATIENT
Start: 2023-09-27 | End: 2023-09-28

## 2023-09-27 RX ORDER — DEXTROSE MONOHYDRATE 100 MG/ML
INJECTION, SOLUTION INTRAVENOUS CONTINUOUS PRN
Status: DISCONTINUED | OUTPATIENT
Start: 2023-09-27 | End: 2023-09-30 | Stop reason: HOSPADM

## 2023-09-27 RX ORDER — FUROSEMIDE 10 MG/ML
20 INJECTION INTRAMUSCULAR; INTRAVENOUS ONCE
Status: DISCONTINUED | OUTPATIENT
Start: 2023-09-27 | End: 2023-09-27

## 2023-09-27 RX ORDER — INSULIN LISPRO 100 [IU]/ML
0-8 INJECTION, SOLUTION INTRAVENOUS; SUBCUTANEOUS
Status: DISCONTINUED | OUTPATIENT
Start: 2023-09-27 | End: 2023-09-30 | Stop reason: HOSPADM

## 2023-09-27 RX ORDER — INSULIN LISPRO 100 [IU]/ML
4 INJECTION, SOLUTION INTRAVENOUS; SUBCUTANEOUS
Status: DISCONTINUED | OUTPATIENT
Start: 2023-09-27 | End: 2023-09-30 | Stop reason: HOSPADM

## 2023-09-27 RX ORDER — METOPROLOL SUCCINATE 25 MG/1
25 TABLET, EXTENDED RELEASE ORAL DAILY
Status: DISCONTINUED | OUTPATIENT
Start: 2023-09-27 | End: 2023-09-30 | Stop reason: HOSPADM

## 2023-09-27 RX ORDER — SODIUM CHLORIDE 9 MG/ML
INJECTION, SOLUTION INTRAVENOUS CONTINUOUS
Status: ACTIVE | OUTPATIENT
Start: 2023-09-27 | End: 2023-09-27

## 2023-09-27 RX ORDER — INSULIN GLARGINE 100 [IU]/ML
10 INJECTION, SOLUTION SUBCUTANEOUS NIGHTLY
Status: DISCONTINUED | OUTPATIENT
Start: 2023-09-27 | End: 2023-09-27

## 2023-09-27 RX ORDER — ONDANSETRON 2 MG/ML
4 INJECTION INTRAMUSCULAR; INTRAVENOUS EVERY 6 HOURS PRN
Status: DISCONTINUED | OUTPATIENT
Start: 2023-09-27 | End: 2023-09-28 | Stop reason: SDUPTHER

## 2023-09-27 RX ORDER — SPIRONOLACTONE 25 MG/1
25 TABLET ORAL DAILY
Status: DISCONTINUED | OUTPATIENT
Start: 2023-09-27 | End: 2023-09-30 | Stop reason: HOSPADM

## 2023-09-27 RX ORDER — INSULIN LISPRO 100 [IU]/ML
10 INJECTION, SOLUTION INTRAVENOUS; SUBCUTANEOUS ONCE
Status: CANCELLED | OUTPATIENT
Start: 2023-09-27

## 2023-09-27 RX ORDER — ACETAMINOPHEN 650 MG/1
650 SUPPOSITORY RECTAL EVERY 6 HOURS PRN
Status: DISCONTINUED | OUTPATIENT
Start: 2023-09-27 | End: 2023-09-30 | Stop reason: HOSPADM

## 2023-09-27 RX ORDER — SODIUM CHLORIDE 0.9 % (FLUSH) 0.9 %
5-40 SYRINGE (ML) INJECTION PRN
Status: DISCONTINUED | OUTPATIENT
Start: 2023-09-27 | End: 2023-09-30 | Stop reason: HOSPADM

## 2023-09-27 RX ADMIN — SACUBITRIL AND VALSARTAN 1 TABLET: 24; 26 TABLET, FILM COATED ORAL at 22:41

## 2023-09-27 RX ADMIN — PERFLUTREN 1.5 ML: 6.52 INJECTION, SUSPENSION INTRAVENOUS at 14:44

## 2023-09-27 RX ADMIN — SODIUM CHLORIDE: 9 INJECTION, SOLUTION INTRAVENOUS at 02:22

## 2023-09-27 RX ADMIN — SPIRONOLACTONE 25 MG: 25 TABLET ORAL at 15:06

## 2023-09-27 RX ADMIN — INSULIN LISPRO 4 UNITS: 100 INJECTION, SOLUTION INTRAVENOUS; SUBCUTANEOUS at 18:37

## 2023-09-27 RX ADMIN — Medication 10 ML: at 09:04

## 2023-09-27 RX ADMIN — INSULIN LISPRO 6 UNITS: 100 INJECTION, SOLUTION INTRAVENOUS; SUBCUTANEOUS at 11:52

## 2023-09-27 RX ADMIN — INSULIN LISPRO 4 UNITS: 100 INJECTION, SOLUTION INTRAVENOUS; SUBCUTANEOUS at 22:43

## 2023-09-27 RX ADMIN — ROSUVASTATIN CALCIUM 20 MG: 20 TABLET, FILM COATED ORAL at 22:41

## 2023-09-27 RX ADMIN — ACETAMINOPHEN 650 MG: 325 TABLET ORAL at 02:49

## 2023-09-27 RX ADMIN — EMPAGLIFLOZIN 10 MG: 10 TABLET, FILM COATED ORAL at 11:52

## 2023-09-27 RX ADMIN — CLOPIDOGREL 75 MG: 75 TABLET, FILM COATED ORAL at 09:03

## 2023-09-27 RX ADMIN — ASPIRIN 81 MG: 81 TABLET, COATED ORAL at 09:03

## 2023-09-27 RX ADMIN — METOPROLOL SUCCINATE 25 MG: 25 TABLET, EXTENDED RELEASE ORAL at 02:46

## 2023-09-27 RX ADMIN — INSULIN GLARGINE 10 UNITS: 100 INJECTION, SOLUTION SUBCUTANEOUS at 03:42

## 2023-09-27 RX ADMIN — CEFTRIAXONE SODIUM 1000 MG: 1 INJECTION, POWDER, FOR SOLUTION INTRAMUSCULAR; INTRAVENOUS at 02:47

## 2023-09-27 RX ADMIN — HEPARIN SODIUM 5000 UNITS: 5000 INJECTION INTRAVENOUS; SUBCUTANEOUS at 09:03

## 2023-09-27 RX ADMIN — ACETAMINOPHEN 650 MG: 325 TABLET ORAL at 09:12

## 2023-09-27 RX ADMIN — INSULIN LISPRO 10 UNITS: 100 INJECTION, SOLUTION INTRAVENOUS; SUBCUTANEOUS at 00:34

## 2023-09-27 RX ADMIN — HEPARIN SODIUM 5000 UNITS: 5000 INJECTION INTRAVENOUS; SUBCUTANEOUS at 22:42

## 2023-09-27 RX ADMIN — ROSUVASTATIN CALCIUM 20 MG: 20 TABLET, FILM COATED ORAL at 02:46

## 2023-09-27 RX ADMIN — LEVOTHYROXINE SODIUM 50 MCG: 0.03 TABLET ORAL at 06:11

## 2023-09-27 RX ADMIN — SACUBITRIL AND VALSARTAN 1 TABLET: 24; 26 TABLET, FILM COATED ORAL at 15:06

## 2023-09-27 RX ADMIN — IOPAMIDOL 75 ML: 755 INJECTION, SOLUTION INTRAVENOUS at 17:00

## 2023-09-27 RX ADMIN — INSULIN GLARGINE 15 UNITS: 100 INJECTION, SOLUTION SUBCUTANEOUS at 22:43

## 2023-09-27 ASSESSMENT — PAIN - FUNCTIONAL ASSESSMENT: PAIN_FUNCTIONAL_ASSESSMENT: PREVENTS OR INTERFERES SOME ACTIVE ACTIVITIES AND ADLS

## 2023-09-27 ASSESSMENT — PAIN DESCRIPTION - DESCRIPTORS: DESCRIPTORS: SPASM;SHARP

## 2023-09-27 ASSESSMENT — PAIN DESCRIPTION - LOCATION
LOCATION: LEG
LOCATION: LEG

## 2023-09-27 ASSESSMENT — PAIN DESCRIPTION - ORIENTATION
ORIENTATION: RIGHT
ORIENTATION: RIGHT;LEFT

## 2023-09-27 ASSESSMENT — PAIN SCALES - GENERAL: PAINLEVEL_OUTOF10: 7

## 2023-09-27 NOTE — ED PROVIDER NOTES
In addition to the advanced practice provider, I personally saw Lizette Baum and performed a substantive portion of the visit including all aspects of the medical decision making. Medical Decision Making  Patient has a history of combined systolic and diastolic heart failure, last EF 40% in January of this year. She has been short of breath with exertion. She denies peripheral edema. Today she got a call from her PCP that her BNP was greater than 11,000 on outpatient labs and was told to come to the emergency room. She is not having shortness of breath at rest.  She denies chest pain. She takes Plavix and aspirin daily. She is not sure if she has been taking her Entresto. Recently traveled to [facility]. Denies leg pain. On exam pt is resting comfortably  Cardiac RRR, no murmur  Lungs clear bilaterally, no increased work of breathing  No peripheral edema. EKG  The Ekg interpreted by me in the absence of a cardiologist shows. Sinus rhythm with a ventricular rate of 77. Axis normal.  QTc appropriate. There are lateral ST depressions noted. Compared to prior 8-, inferior lateral ST depressions today are much less pronounced. SEP-1  Is this patient to be included in the SEP-1 Core Measure due to severe sepsis or septic shock? No   Exclusion criteria - the patient is NOT to be included for SEP-1 Core Measure due to: Infection is not suspected    Screenings     M[place]ir Coma Scale  Eye Opening: Spontaneous  Best Verbal Response: Oriented  Best Motor Response: Obeys commands  M[place]ir Coma Scale Score: 15     XR CHEST PORTABLE   Final Result   Mild pulmonary vascular congestion which could be cardiac or from volume   overload. Additional component of bronchiolitis in the lower lungs may be   present.            Labs Reviewed   CBC WITH AUTO DIFFERENTIAL - Abnormal; Notable for the following components:       Result Value    RBC 3.57 (*)     Hemoglobin 10.8 (*)     Hematocrit 32.5 (*)

## 2023-09-27 NOTE — PROGRESS NOTES
[address] [facility] Department   Phone: [phone]    Physical Therapy    [x] Initial Evaluation            [] Daily Treatment Note         [] Discharge Summary      Patient: Lakshmi Hummel   : 1945   MRN: [id number]   Date of Service:  2023  Admitting Diagnosis: Hyperglycemia  Current Admission Summary: Pt to ED for SOB. PCP notified pt of abnormal lab values. Past Medical History:  has a past medical history of Diabetes mellitus ([address]) and Hypothyroid. Past Surgical History:  has no past surgical history on file. Discharge Recommendations: Lakshmi Hummel scored a 19/24 on the AM-PAC short mobility form. Current research shows that an AM-PAC score of 18 or greater is typically associated with a discharge to the patient's home setting. Based on the patient's AM-PAC score and their current functional mobility deficits, it is recommended that the patient have 2-3 sessions per week of Physical Therapy at d/c to increase the patient's independence. At this time, this patient demonstrates the endurance and safety to discharge home with HHPT and a follow up treatment frequency of 2-3x/wk. Please see assessment section for further patient specific details. HOME HEALTH CARE: LEVEL 1 STANDARD  - Initial home health evaluation to occur within 24-48 hours, in patient home   - Therapy to evaluate with goal of regaining prior level of functioning   - Therapy to evaluate if patient has [address] needs for personal care     If patient discharges prior to next session this note will serve as a discharge summary. Please see below for the latest assessment towards goals.       DME Required For Discharge: patient has all required DME for discharge  Precautions/Restrictions: high fall risk  Weight Bearing Restrictions: no restrictions  [] Right Upper Extremity  [] Left Upper Extremity [] Right Lower Extremity  [] Left Lower Extremity     Required Braces/Orthotics: no braces

## 2023-09-27 NOTE — PROGRESS NOTES
[address] [facility] Department   Phone: [phone]    Occupational Therapy    [x] Initial Evaluation            [] Daily Treatment Note         [] Discharge Summary      Patient: Carla Hurley   : 1945   MRN: [id number]   Date of Service:  2023    Admitting Diagnosis:  Hyperglycemia  Current Admission Summary: Carla Hurley is a 70 y.o. female who presents the emergency department today for evaluation for shortness of breath. The patient states that she has been experiencing shortness of breath, and states that this has been ongoing for the past 1 to 2 weeks. The patient states that her shortness of breath is worse with exertion, and lying flat. The patient states that she saw her primary care physician today, she states that she had lab work drawn and was told that her BNP was \"11,000\". Patient states that she was sent to the emergency room for further care and evaluation. The patient states that she does have a history of congestive heart failure however she has not noticed any lower leg pain or swelling. Patient states that she is actually lost weight. Patient denies any chest pain. The patient states that over the past 2 weeks she has had a dry intermittent cough, no sputum production or hemoptysis. Patient states that she would like to be tested for COVID-19 while she is here. Patient denies any fevers. She denies any abdominal pain, nausea, vomiting or diarrhea. No dysuria hematuria. The patient is also diabetic. Patient has no history of DVT or PE but tells me that she recently traveled to and from [place] within the past month. Past Medical History:  has a past medical history of Diabetes mellitus ([address]) and Hypothyroid. Past Surgical History:  has no past surgical history on file. Discharge Recommendations: Carla Hurley scored a 22/24 on the AM-PAC ADL Inpatient form.  Current research shows that an AM-PAC score of 18 or greater is typically commode  Instrumental Activities of Daily Living  No IADL completed on this date. Functional Mobility  Bed Mobility:  Supine to Sit: supervision  Comments:HOB slightly elevated  Transfers:  Sit to stand transfer:stand by assistance, contact guard assistance  Stand to sit transfer: stand by assistance, contact guard assistance  Toilet transfer: stand by assistance  Comments:CGA required for stand from EOB; SBA for subsequent stands from EOB, toilet, and recliner during session. Pt performed x7-9 STS throughout session from various surfaces. Functional Mobility  Functional Mobility Activity: to/from bathroom, to/from therapy room, please refer to PT not for gait mechanics  Device Use: rolling walker  Required Assistance: stand by assistance  Comment: Initially CGA for first 5 ft without AE, due to significant lean onto EOB/environmental supports, RW brought forth for balance. SBA to/from bathroom with RW. Progressing to SUP with RW in hallway. Balance:  Static Sitting Balance: good(+): independent with high level dynamic balance in unsupported position  Dynamic Sitting Balance: good: independent with functional balance in unsupported position  Static Standing Balance: fair (+): maintains balance at SBA/supervision without use of UE support  Dynamic Standing Balance: fair (-): maintains balance at SBA with use of UE support  Comments:Pt sat EOB for subjective questioning and ADLs ~20 minutes total without LOB. Pt sat and stood at toilet with SBA for balance for ADLs ~8-10 minutes total. Pt stood at sink for ADLs ~5-8 minutes total with SBA for balance and intermittent UE support.    Other Therapeutic Interventions  Stair Mobility:  Number of Steps: 4  Step Height: 6 inch  Hand Rails: (B) handrail  Device: no device  Assistance: stand by assistance  Functional Outcomes  AM-PAC Inpatient Daily Activity Raw Score: 22    Cognition  WFL  Comments:  frequently repeating story throughout session  Orientation:    alert and oriented x 4  Command Following:   [facility]     Education  Barriers To Learning: hearing  Patient Education: patient educated on goals, OT role and benefits, plan of care, precautions, ADL adaptive strategies, transfer training, discharge recommendations  Learning Assessment:  patient verbalizes understanding, would benefit from continued reinforcement    Assessment  Activity Tolerance: Good  Impairments Requiring Therapeutic Intervention: decreased functional mobility, decreased ADL status, decreased endurance, decreased balance  Prognosis: good  Clinical Assessment: Pt presents at [facility] with hyperglycemia and UTI slightly below baseline d/t generalized weakness and decreased endurance. Skilled OT warranted during this acute care stay to improve safety and independence with all occupational pursuits. Safety Interventions: patient left in chair, chair alarm in place, call light within reach, patient at risk for falls, and nurse notified    Plan  Frequency: 3-5 x/per week  Current Treatment Recommendations: strengthening, balance training, functional mobility training, transfer training, endurance training, patient/caregiver education, ADL/self-care training, IADL training, safety education, and equipment evaluation/education    Goals  Patient Goals: to get stronger   Short Term Goals:  Time Frame: upon d/c  Patient will complete lower body ADL at modified independent   Patient will complete functional transfers at modified independent   Patient will complete functional mobility at modified independent   Patient will increase [facility] ADL score = to or > than 24/24  Patient will complete HEP with SUP to improve endurance for ADLs    Above goals reviewed on 9/27/2023. All goals are ongoing at this time unless indicated above.        Therapy Session Time     Individual Group Co-treatment   Time In    6207   Time Out    1005   Minutes    84        Timed Code Treatment Minutes:  69    Total Treatment Minutes:  84

## 2023-09-27 NOTE — CONSULTS
[address]  [phone]      Chief Complaint   Patient presents with    Shortness of Breath     Pt arrives to ED via self c/o abnormal labs that were taken this morning, states her PCP called about a heart level (BNP) that was >11,000. Pt states hr PCP told her to come to the ER. PT c/o \"huffing and puffing\" when she walks. Abnormal Lab        Reason for consult: heart failure, elevated troponin    ASSESSMENT AND PLAN:    Acute on chronic systolic heart failure   Has been on ARB, BB and SGLT2 at home   Not on MRA due to issues with hyperkalemia   LVEF 15% 2022 => 40% after PCI to LAD and meds   Was last seen in CHF clinic 3/20, supposed to be back in 4 months but didn't come    Ischemic cardiomyopathy    CAD s/p PCI to 95% LAD lesion 2022    Type 2 diabetes with hyperglycemia    Troponin elevation, mild, but given acute severe decline in LVEF, need to treat as presumed NSTEMI until proven otherwise    Anemia, normocytic, chronic, likely due to chronic disease      Plan  -Needs aggressive IV diuresis - start lasix 40 mg IV BID    -Continue DAPT at this time given concern for new ACS event  -T wave inversions are concerning for a new ACS event  -STAT echo to evaluate LVEF, new WMA  -Will get cath tomorrow given acute decline in LVEF  -NPO at midnight    -Continue GDMT - Restart home Entresto 24/26 BID; already on home Jardiance and metoprolol  -Given that K+ is low and patient is now on IV diuretics, start spironolactone 25 mg daily    -Check iron/TIBC and replete with IV iron if low    -I discussed risks, benefits, and alternatives of heart cath and possible PCI to the patient. I discussed risks, incluiding risk of bleeding, infection, kidney injury (possibly requiring dialysis), vascular injury, arrhythmia, myocardial infarction, stroke, and even death, and the patient would like to proceed with heart cath.     History of Present Illness:  Albino Faustin is a 70 y.o. patient is a very pleasant lady with ischemic cardiomyopathy, seen in our CHF clinic. About a year ago she presented with servere dyspnea (never having chest pain) and found to have an LVEF 15-20%; cath showed 95% mid LAD lesion which was stented, she was placed on goal directed medical therapy for systolic heart failure and LVEF improved to 40-45% 6 months later. She has had spotty follow-up since and hasn't been on Jardiance in about 3 months. She has been busy caring for her ailing , and as a result hasn't been doing as good of a job as usual with her low salt diet. She is diabetic and BS have been very high. Past 2-3 weeks she has been having worsening orthopnea. Saw PCP who ordered a BNP yesterday, which came back at 11,000 so she was sent to the ER. Past Medical History:   has a past medical history of Diabetes mellitus ([address]) and Hypothyroid. Surgical History:   has no past surgical history on file. Social History:   reports that she has never smoked. She has never used smokeless tobacco. She reports that she does not drink alcohol and does not use drugs. History reviewed. No pertinent family history. Home Medications:  Were reviewed and are listed in nursing record. and/or listed below  Prior to Admission medications    Medication Sig Start Date End Date Taking?  Authorizing Provider   insulin glargine (LANTUS) 100 UNIT/ML injection vial Inject 10 Units into the skin nightly Indications: has been taking since january, missled dose on 9/26/23   Yes Historical Provider, MD   sulfamethoxazole-trimethoprim (BACTRIM DS;SEPTRA DS) 800-160 MG per tablet Take 1 tablet by mouth 2 times daily for 7 days 9/26/23 10/3/23  Delmy Dahl MD   sacubitril-valsartan (ENTRESTO) 24-26 MG per tablet Take 1 tablet by mouth 2 times daily 9/25/23   Delmy Dahl MD   clopidogrel (PLAVIX) 75 MG tablet Take 1 tablet by mouth daily 9/25/23   Delmy Dahl MD   rosuvastatin (CRESTOR) 20 MG tablet Take 1 tablet

## 2023-09-27 NOTE — ED PROVIDER NOTES
Chirag Bray        Pt Name: Darlene Gallegos  MRN: [id number]  [address] 1945  Date of evaluation: 9/26/2023  Provider: Fransico Leija PA-C  PCP: Delmy Dahl MD  Note Started: 10:57 PM EDT 9/26/23       I have seen and evaluated this patient with my supervising physician Saad Cook MD.      [address]       Chief Complaint   Patient presents with    Shortness of Breath     Pt arrives to ED via self c/o abnormal labs that were taken this morning, states her PCP called about a heart level (BNP) that was >11,000. Pt states hr PCP told her to come to the ER. PT c/o \"huffing and puffing\" when she walks. Abnormal Lab       HISTORY OF PRESENT ILLNESS: 1 or more Elements     History From: Patient  Limitations to history : None    Hao Gooden is a 70 y.o. female who presents the emergency department today for evaluation for shortness of breath. The patient states that she has been experiencing shortness of breath, and states that this has been ongoing for the past 1 to 2 weeks. The patient states that her shortness of breath is worse with exertion, and lying flat. The patient states that she saw her primary care physician today, she states that she had lab work drawn and was told that her BNP was \"11,000\". Patient states that she was sent to the emergency room for further care and evaluation. The patient states that she does have a history of congestive heart failure however she has not noticed any lower leg pain or swelling. Patient states that she is actually lost weight. Patient denies any chest pain. The patient states that over the past 2 weeks she has had a dry intermittent cough, no sputum production or hemoptysis. Patient states that she would like to be tested for COVID-19 while she is here. Patient denies any fevers. She denies any abdominal pain, nausea, vomiting or diarrhea. No dysuria hematuria.   The patient is NOT to be included for SEP-1 Core Measure due to: Infection is not suspected    Chronic Conditions affecting care:  has a past medical history of Diabetes mellitus ([address]) and Hypothyroid. CONSULTS: (Who and What was discussed)  None      Social Determinants Significantly Affecting Health : None    Records Reviewed (External and Source) previous primary care office visits, previous lab work results    CC/HPI Summary, DDx, ED Course, and Reassessment: Briefly, this is a 45-year-old female who presents to the emergency department today for evaluation for shortness of breath. Patient has been experiencing shortness of breath for the past 1 to 2 weeks. She is also had a dry cough. No complaints of any pain. Patient states that she saw her primary care physician today and was sent to the ED as her BNP was greater than 11,000    On examination, she has diminished aeration to bilateral bases. She has no lower leg edema. Disposition Considerations (tests considered but not done, Admit vs D/C, Shared Decision Making, Pt Expectation of Test or Tx.):    EKG is seconded by my attending, please see her note for further details    Rate CBC shows no evidence of leukocytosis mild stable anemia. CMP shows a glucose of 694 however there is no anion gap and CO2 is normal.  Urine shows no evidence of ketonuria, 10 units of insulin IV have been ordered. Troponin is elevated, and BNP is elevated, likely due to her pulmonary congestion, she will be given Lasix. Her D-dimer is normal    Urine does show infection however patient states that she is currently taking Bactrim for this. Due to the patient's hyperglycemia, CHF, feel that she would benefit from admission. Patient family updated, agreeable with plan, stable for admission    I am the Primary Clinician of Record. FINAL IMPRESSION      1. Acute on chronic congestive heart failure, unspecified heart failure type ([address])    2.  Hyperglycemia, unspecified DISPOSITION/PLAN     DISPOSITION Admitted 09/27/2023 12:25:55 AM      PATIENT REFERRED TO:  No follow-up provider specified.     DISCHARGE MEDICATIONS:  New Prescriptions    No medications on file       DISCONTINUED MEDICATIONS:  Discontinued Medications    No medications on file              (Please note that portions of this note were completed with a voice recognition program.  Efforts were made to edit the dictations but occasionally words are mis-transcribed.)    Dick Browne PA-C (electronically signed)       Dick Browne PA-C  09/27/23 9630

## 2023-09-27 NOTE — PLAN OF CARE
Problem: Discharge Planning  Goal: Discharge to home or other facility with appropriate resources  Outcome: Progressing  Flowsheets (Taken 9/27/2023 0200 by Delta Ricks RN)  Discharge to home or other facility with appropriate resources: Identify barriers to discharge with patient and caregiver     Problem: Pain  Goal: Verbalizes/displays adequate comfort level or baseline comfort level  Outcome: Progressing     Problem: Safety - Adult  Goal: Free from fall injury  Outcome: Progressing

## 2023-09-27 NOTE — CARE COORDINATION
Case Management Assessment  Initial Evaluation    Date/Time of Evaluation: 9/27/2023 1:49 PM  Assessment Completed by: Fadia Corea RN    If patient is discharged prior to next notation, then this note serves as note for discharge by case management. Patient Name: Avani Willson                   YOB: 1945  Diagnosis: Hyperglycemia [R73.9]  Hyperglycemia, unspecified [R73.9]  Acute on chronic congestive heart failure, unspecified heart failure type [facility]) [I50.9]                   Date / Time: 9/26/2023  9:49 PM    Patient Admission Status: Inpatient   Readmission Risk (Low < 19, Mod (19-27), High > 27): No data recorded  Current PCP: Casey Cruz MD  PCP verified by CM? (P) Yes    Chart Reviewed: Yes      History Provided by: (P) Patient  Patient Orientation: (P) Alert and Oriented    Patient Cognition: (P) Alert    Hospitalization in the last 30 days (Readmission):  No    If yes, Readmission Assessment in CM Navigator will be completed. Advance Directives:      Code Status: Full Code   Patient's Primary Decision Maker is: (P) Legal Next of Kin      Discharge Planning:    Patient lives with: (P) Spouse/Significant Other, Children Type of Home: (P) House  Primary Care Giver: (P) Self  Patient Support Systems include: (P) Spouse/Significant Other, Children   Current Financial resources: (P) None  Current community resources: (P) None  Current services prior to admission: (P) None            Current DME:              Type of Home Care services:  (P) None    ADLS  Prior functional level: (P) Independent in ADLs/IADLs  Current functional level: (P) Independent in ADLs/IADLs    PT AM-PAC: 19 /24  OT AM-PAC: 22 /24    Family can provide assistance at DC:     Would you like Case Management to discuss the discharge plan with any other family members/significant others, and if so, who? (P) No  Plans to Return to Present Housing: (P) Yes  Other Identified Issues/Barriers to RETURNING to current housing:   Potential Assistance needed at discharge: (P) N/A            Potential DME:    Patient expects to discharge to: (P) [address] [facility] for transportation at discharge: (P) Family    Financial    Payor: Vika Sims / Plan: [address] / Product Type: *No Product type* /     Does insurance require precert for SNF: Yes    Potential assistance Purchasing Medications: (P) No  Meds-to-Beds request:        Jesus Arroyo [id number] [place], [address]  Phone: [phone] Fax: [phone] [address] [facility], [address] [place] [address] [phone] Maru Parish [phone]  [address] [place] [address]  Phone: [phone] Fax: [phone]      Notes:    Factors facilitating achievement of predicted outcomes: Family support    Barriers to discharge: Pain    Additional Case Management Notes:     - 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    - discharge plan TBD    The Plan for Transition of Care is related to the following treatment goals of Hyperglycemia [R73.9]  Hyperglycemia, unspecified [R73.9]  Acute on chronic congestive heart failure, unspecified heart failure type ([address]) [J54.6]    IF APPLICABLE: The Patient and/or patient representative Hao and her family were provided with a choice of provider and agrees with the discharge plan. Freedom of choice list with basic dialogue that supports the patient's individualized plan of care/goals and shares the quality data associated with the providers was provided to:     Patient Representative Name:       The Patient and/or Patient Representative Agree with the Discharge Plan?       Gardenia Fernández RN  Case Management Department  Ph: [phone] Fax: [phone]

## 2023-09-27 NOTE — PROGRESS NOTES
ED TO INPATIENT SBAR HANDOFF    Patient Name: Will Estevez   :  1945  70 y.o. MRN:  [id number]  Preferred Name  [facility]  ED Room #:  ED-0018/18  Family/Caregiver Present yes   Restraints no   Sitter no   Sepsis Risk Score Sepsis Risk Score: 0.73    Situation  Code Status: Prior No additional code details. Allergies: Codeine  Weight: Patient Vitals for the past 96 hrs (Last 3 readings):   Weight   23 2156 85 lb (38.6 kg)     Arrived from: home  Chief Complaint:   Chief Complaint   Patient presents with    Shortness of Breath     Pt arrives to ED via self c/o abnormal labs that were taken this morning, states her PCP called about a heart level (BNP) that was >11,000. Pt states hr PCP told her to come to the ER. PT c/o \"huffing and puffing\" when she walks. Abnormal Lab     Hospital Problem/Diagnosis:  Principal Problem:    Hyperglycemia  Resolved Problems:    * No resolved hospital problems. *    Imaging:   XR CHEST PORTABLE   Final Result   Mild pulmonary vascular congestion which could be cardiac or from volume   overload. Additional component of bronchiolitis in the lower lungs may be   present.            Abnormal labs:   Abnormal Labs Reviewed   CBC WITH AUTO DIFFERENTIAL - Abnormal; Notable for the following components:       Result Value    RBC 3.57 (*)     Hemoglobin 10.8 (*)     Hematocrit 32.5 (*)     All other components within normal limits   COMPREHENSIVE METABOLIC PANEL - Abnormal; Notable for the following components:    Sodium 127 (*)     Chloride 93 (*)     Glucose 694 (*)     BUN 30 (*)     Est, Glom Filt Rate 58 (*)     Alkaline Phosphatase 131 (*)     All other components within normal limits    Narrative:     CALL  [place] tel. [phone],  Chemistry results called to and read back by tanya engel, 2023  23:48, by [address] - Abnormal; Notable for the following components:    Glucose, Ur >=1000 (*)     Blood, Urine SMALL (*)

## 2023-09-27 NOTE — PROGRESS NOTES
Son asked that doctor on dayshift give the son in law who is her PCP a call in the am. Dr. Shelley Koroma [phone]

## 2023-09-27 NOTE — H&P
V2.0  History and Physical      Name:  Albino Zafar /Age/Sex: 1945  (70 y.o. female)   MRN & CSN:  [id number] & [id number] Encounter Date/Time: 2023 12:12 AM EDT   Location:  [facility] PCP: Nidhi Richardson MD       Hospital Day: 2    Assessment and Plan:   Albino Zafar is a 70 y.o. female with a pmh of type 2 diabetes mellitus, CHF, hypothyroidism, CAD who presents with Hyperglycemia    Hospital Problems             Last Modified POA    * (Principal) Hyperglycemia 2023 Yes       Plan:  Hyperglycemia:  -Patient presented to ED for chief complaint of abnormal lab. She had lab work yesterday and was asked to come to ED by her PCP. Patient reports increased urinary frequency and urgency. She also endorses being weak and thirsty. She has not been checking her glucose level since she has not had her freestyle kim.  -In the ED, her blood glucose was 694.  -Status post lispro 10 units in the ED.  -Gentle IV fluid  -Medium dose sliding scale  -PT OT    2. UTI:  -UA was positive for small leukocyte esterase and WBC. -Patient complains of weakness and urinary urgency/frequency. -Ceftriaxone  -Urine culture    3. CHF:  -Not in acute exacerbation  -Continue metoprolol  -Strict in and out  -Daily weight    4. CAD:  -Continue aspirin, Plavix, statin    5. Hypothyroidism:  -Continue home levothyroxine     Disposition:   Current Living situation: Home  Expected Disposition: Home  Estimated D/C: 1 to 2 days    Diet No diet orders on file   DVT Prophylaxis [] Lovenox, [x]  Heparin, [] SCDs, [] Ambulation,  [] Eliquis, [] Xarelto, [] Coumadin   Code Status Prior   Surrogate Decision Maker/ POA            History from:     patient    History of Present Illness:     Chief Complaint: Abnormal labs  Albino Zafar is a 70 y.o. female with pmh of CHF, CAD, diabetes mellitus, hypothyroidism who presents with chief complaint of abnormal labs.     Patient mentions that she has been having urinary Drug use: Never       Medications:   Medications:        Infusions:   PRN Meds:     Labs      CBC:   Recent Labs     09/26/23  0945 09/26/23 2307   WBC 8.7 7.2   HGB 11.1* 10.8*    252     BMP:    Recent Labs     09/26/23  0945 09/26/23 2307   * 127*   K 4.4 5.1   CL 93* 93*   CO2 21 23   BUN 21* 30*   CREATININE 1.0 1.0   GLUCOSE 448* 694*     Hepatic:   Recent Labs     09/26/23  0945 09/26/23 2307   AST 17 15   ALT 16 14   BILITOT 0.5 0.4   ALKPHOS 133* 131*     Lipids:   Lab Results   Component Value Date/Time    CHOL 158 09/26/2023 09:45 AM    HDL 55 09/26/2023 09:45 AM    TRIG 80 09/26/2023 09:45 AM     Hemoglobin A1C:   Lab Results   Component Value Date/Time    LABA1C 8.6 05/31/2023 10:28 AM     TSH:   Lab Results   Component Value Date/Time    TSH 2.93 08/11/2022 05:04 AM     Troponin: No results found for: \"TROPONINT\"  Lactic Acid: No results for input(s): \"LACTA\" in the last 72 hours. BNP:   Recent Labs     09/26/23  0945 09/26/23 2307   PROBNP 11,600* 8,294*     UA:  Lab Results   Component Value Date/Time    NITRU Negative 09/26/2023 11:07 PM    COLORU Yellow 09/26/2023 11:07 PM    PHUR 6.5 09/26/2023 11:07 PM    WBCUA 53 09/26/2023 11:07 PM    RBCUA 6 09/26/2023 11:07 PM    BACTERIA None Seen 09/26/2023 11:07 PM    CLARITYU Clear 09/26/2023 11:07 PM    SPECGRAV >=1.030 09/26/2023 11:07 PM    LEUKOCYTESUR SMALL 09/26/2023 11:07 PM    UROBILINOGEN 0.2 09/26/2023 11:07 PM    BILIRUBINUR Negative 09/26/2023 11:07 PM    BLOODU SMALL 09/26/2023 11:07 PM    GLUCOSEU >=1000 09/26/2023 11:07 PM    KETUA Negative 09/26/2023 11:07 PM     Urine Cultures: No results found for: \"LABURIN\"  Blood Cultures:   Lab Results   Component Value Date/Time    BC No Growth after 4 days of incubation. 08/10/2022 06:50 PM     Lab Results   Component Value Date/Time    BLOODCULT2 No Growth after 4 days of incubation.  08/10/2022 06:50 PM     Organism: No results found for: \"ORG\"    Imaging/Diagnostics Last 24

## 2023-09-27 NOTE — PROGRESS NOTES
Nutrition Education    Provided heart failure diet education. Education included low sodium diet guidelines (3-4 gm Na+/day) and fluid restriction (64 oz/day). Reviewed foods to choose and foods to avoid, along with label reading and ways to add flavor to food. Pt currently tries to follow a low sodium diet at home and does not add salt to food. Also reviewed 1537 [address] RAP Index guidelines. Pt states understanding of education. Time spent with patient: 15 min for CHF minutes. Learners: Patient  Readiness: Eager  Method: Explanation and Handout  Response: Verbalizes Understanding  Contact name and number provided.     Art Hernandez RD, LD  Contact Number: [phone]

## 2023-09-28 DIAGNOSIS — I25.83 CORONARY ARTERY DISEASE DUE TO LIPID RICH PLAQUE: Primary | ICD-10-CM

## 2023-09-28 DIAGNOSIS — I25.10 CORONARY ARTERY DISEASE DUE TO LIPID RICH PLAQUE: Primary | ICD-10-CM

## 2023-09-28 LAB
ANION GAP SERPL CALCULATED.3IONS-SCNC: 9 MMOL/L (ref 3–16)
BACTERIA UR CULT: NORMAL
BASOPHILS # BLD: 0.1 K/UL (ref 0–0.2)
BASOPHILS NFR BLD: 0.9 %
BUN SERPL-MCNC: 30 MG/DL (ref 7–20)
CALCIUM SERPL-MCNC: 8.9 MG/DL (ref 8.3–10.6)
CHLORIDE SERPL-SCNC: 106 MMOL/L (ref 99–110)
CO2 SERPL-SCNC: 23 MMOL/L (ref 21–32)
CREAT SERPL-MCNC: 1 MG/DL (ref 0.6–1.2)
DEPRECATED RDW RBC AUTO: 12.4 % (ref 12.4–15.4)
EOSINOPHIL # BLD: 0.5 K/UL (ref 0–0.6)
EOSINOPHIL NFR BLD: 6.6 %
GFR SERPLBLD CREATININE-BSD FMLA CKD-EPI: 58 ML/MIN/{1.73_M2}
GLUCOSE BLD-MCNC: 106 MG/DL (ref 70–99)
GLUCOSE BLD-MCNC: 126 MG/DL (ref 70–99)
GLUCOSE BLD-MCNC: 149 MG/DL (ref 70–99)
GLUCOSE BLD-MCNC: 347 MG/DL (ref 70–99)
GLUCOSE BLD-MCNC: 441 MG/DL (ref 70–99)
GLUCOSE BLD-MCNC: 450 MG/DL (ref 70–99)
GLUCOSE BLD-MCNC: 68 MG/DL (ref 70–99)
GLUCOSE BLD-MCNC: 72 MG/DL (ref 70–99)
GLUCOSE BLD-MCNC: 76 MG/DL (ref 70–99)
GLUCOSE SERPL-MCNC: 83 MG/DL (ref 70–99)
HCT VFR BLD AUTO: 31.1 % (ref 36–48)
HGB BLD-MCNC: 10.7 G/DL (ref 12–16)
LEFT VENTRICULAR EJECTION FRACTION MODE: NORMAL
LV EF: 35 %
LYMPHOCYTES # BLD: 1.9 K/UL (ref 1–5.1)
LYMPHOCYTES NFR BLD: 22.7 %
MCH RBC QN AUTO: 30.9 PG (ref 26–34)
MCHC RBC AUTO-ENTMCNC: 34.4 G/DL (ref 31–36)
MCV RBC AUTO: 89.8 FL (ref 80–100)
MONOCYTES # BLD: 0.7 K/UL (ref 0–1.3)
MONOCYTES NFR BLD: 8.5 %
NEUTROPHILS # BLD: 5.1 K/UL (ref 1.7–7.7)
NEUTROPHILS NFR BLD: 61.3 %
PERFORMED ON: ABNORMAL
PERFORMED ON: NORMAL
PERFORMED ON: NORMAL
PLATELET # BLD AUTO: 264 K/UL (ref 135–450)
PMV BLD AUTO: 8.4 FL (ref 5–10.5)
POC ACT LR: 243 SEC
POC ACT LR: 319 SEC
POTASSIUM SERPL-SCNC: 4.4 MMOL/L (ref 3.5–5.1)
RBC # BLD AUTO: 3.47 M/UL (ref 4–5.2)
SODIUM SERPL-SCNC: 138 MMOL/L (ref 136–145)
WBC # BLD AUTO: 8.2 K/UL (ref 4–11)

## 2023-09-28 PROCEDURE — C9600 PERC DRUG-EL COR STENT SING: HCPCS

## 2023-09-28 PROCEDURE — 2580000003 HC RX 258: Performed by: STUDENT IN AN ORGANIZED HEALTH CARE EDUCATION/TRAINING PROGRAM

## 2023-09-28 PROCEDURE — 85347 COAGULATION TIME ACTIVATED: CPT

## 2023-09-28 PROCEDURE — 97535 SELF CARE MNGMENT TRAINING: CPT

## 2023-09-28 PROCEDURE — 93005 ELECTROCARDIOGRAM TRACING: CPT | Performed by: INTERNAL MEDICINE

## 2023-09-28 PROCEDURE — C1874 STENT, COATED/COV W/DEL SYS: HCPCS

## 2023-09-28 PROCEDURE — 99152 MOD SED SAME PHYS/QHP 5/>YRS: CPT | Performed by: INTERNAL MEDICINE

## 2023-09-28 PROCEDURE — 6360000002 HC RX W HCPCS: Performed by: STUDENT IN AN ORGANIZED HEALTH CARE EDUCATION/TRAINING PROGRAM

## 2023-09-28 PROCEDURE — 2580000003 HC RX 258: Performed by: NURSE PRACTITIONER

## 2023-09-28 PROCEDURE — C1887 CATHETER, GUIDING: HCPCS

## 2023-09-28 PROCEDURE — 93458 L HRT ARTERY/VENTRICLE ANGIO: CPT | Performed by: INTERNAL MEDICINE

## 2023-09-28 PROCEDURE — 6370000000 HC RX 637 (ALT 250 FOR IP): Performed by: NURSE PRACTITIONER

## 2023-09-28 PROCEDURE — 2500000003 HC RX 250 WO HCPCS

## 2023-09-28 PROCEDURE — 80048 BASIC METABOLIC PNL TOTAL CA: CPT

## 2023-09-28 PROCEDURE — 4A023N7 MEASUREMENT OF CARDIAC SAMPLING AND PRESSURE, LEFT HEART, PERCUTANEOUS APPROACH: ICD-10-PCS | Performed by: INTERNAL MEDICINE

## 2023-09-28 PROCEDURE — 86341 ISLET CELL ANTIBODY: CPT

## 2023-09-28 PROCEDURE — 99152 MOD SED SAME PHYS/QHP 5/>YRS: CPT

## 2023-09-28 PROCEDURE — 84681 ASSAY OF C-PEPTIDE: CPT

## 2023-09-28 PROCEDURE — 027034Z DILATION OF CORONARY ARTERY, ONE ARTERY WITH DRUG-ELUTING INTRALUMINAL DEVICE, PERCUTANEOUS APPROACH: ICD-10-PCS | Performed by: INTERNAL MEDICINE

## 2023-09-28 PROCEDURE — 6360000002 HC RX W HCPCS: Performed by: NURSE PRACTITIONER

## 2023-09-28 PROCEDURE — C1769 GUIDE WIRE: HCPCS

## 2023-09-28 PROCEDURE — 6360000004 HC RX CONTRAST MEDICATION: Performed by: INTERNAL MEDICINE

## 2023-09-28 PROCEDURE — C1725 CATH, TRANSLUMIN NON-LASER: HCPCS

## 2023-09-28 PROCEDURE — 1200000000 HC SEMI PRIVATE

## 2023-09-28 PROCEDURE — 6360000002 HC RX W HCPCS

## 2023-09-28 PROCEDURE — C1894 INTRO/SHEATH, NON-LASER: HCPCS

## 2023-09-28 PROCEDURE — 99232 SBSQ HOSP IP/OBS MODERATE 35: CPT | Performed by: INTERNAL MEDICINE

## 2023-09-28 PROCEDURE — 6360000002 HC RX W HCPCS: Performed by: INTERNAL MEDICINE

## 2023-09-28 PROCEDURE — 92928 PRQ TCAT PLMT NTRAC ST 1 LES: CPT | Performed by: INTERNAL MEDICINE

## 2023-09-28 PROCEDURE — 85025 COMPLETE CBC W/AUTO DIFF WBC: CPT

## 2023-09-28 PROCEDURE — B2151ZZ FLUOROSCOPY OF LEFT HEART USING LOW OSMOLAR CONTRAST: ICD-10-PCS | Performed by: INTERNAL MEDICINE

## 2023-09-28 PROCEDURE — 6370000000 HC RX 637 (ALT 250 FOR IP)

## 2023-09-28 PROCEDURE — 2709999900 HC NON-CHARGEABLE SUPPLY

## 2023-09-28 PROCEDURE — 6370000000 HC RX 637 (ALT 250 FOR IP): Performed by: INTERNAL MEDICINE

## 2023-09-28 PROCEDURE — 2580000003 HC RX 258: Performed by: INTERNAL MEDICINE

## 2023-09-28 PROCEDURE — B2111ZZ FLUOROSCOPY OF MULTIPLE CORONARY ARTERIES USING LOW OSMOLAR CONTRAST: ICD-10-PCS | Performed by: INTERNAL MEDICINE

## 2023-09-28 PROCEDURE — 36415 COLL VENOUS BLD VENIPUNCTURE: CPT

## 2023-09-28 PROCEDURE — 6370000000 HC RX 637 (ALT 250 FOR IP): Performed by: STUDENT IN AN ORGANIZED HEALTH CARE EDUCATION/TRAINING PROGRAM

## 2023-09-28 PROCEDURE — 99153 MOD SED SAME PHYS/QHP EA: CPT

## 2023-09-28 PROCEDURE — 93458 L HRT ARTERY/VENTRICLE ANGIO: CPT

## 2023-09-28 RX ORDER — OXYCODONE HYDROCHLORIDE AND ACETAMINOPHEN 5; 325 MG/1; MG/1
2 TABLET ORAL EVERY 4 HOURS PRN
Status: DISCONTINUED | OUTPATIENT
Start: 2023-09-28 | End: 2023-09-30

## 2023-09-28 RX ORDER — LANCETS 30 GAUGE
1 EACH MISCELLANEOUS
Qty: 100 EACH | Refills: 3 | Status: SHIPPED | OUTPATIENT
Start: 2023-09-28

## 2023-09-28 RX ORDER — OXYCODONE HYDROCHLORIDE AND ACETAMINOPHEN 5; 325 MG/1; MG/1
1 TABLET ORAL EVERY 4 HOURS PRN
Status: DISCONTINUED | OUTPATIENT
Start: 2023-09-28 | End: 2023-09-30

## 2023-09-28 RX ORDER — ACETAMINOPHEN 325 MG/1
650 TABLET ORAL EVERY 4 HOURS PRN
Status: DISCONTINUED | OUTPATIENT
Start: 2023-09-28 | End: 2023-09-30 | Stop reason: HOSPADM

## 2023-09-28 RX ORDER — ONDANSETRON 2 MG/ML
4 INJECTION INTRAMUSCULAR; INTRAVENOUS EVERY 6 HOURS PRN
Status: DISCONTINUED | OUTPATIENT
Start: 2023-09-28 | End: 2023-09-30 | Stop reason: HOSPADM

## 2023-09-28 RX ORDER — SODIUM CHLORIDE 0.9 % (FLUSH) 0.9 %
5-40 SYRINGE (ML) INJECTION EVERY 12 HOURS SCHEDULED
Status: DISCONTINUED | OUTPATIENT
Start: 2023-09-28 | End: 2023-09-30 | Stop reason: HOSPADM

## 2023-09-28 RX ORDER — SODIUM CHLORIDE 0.9 % (FLUSH) 0.9 %
5-40 SYRINGE (ML) INJECTION PRN
Status: DISCONTINUED | OUTPATIENT
Start: 2023-09-28 | End: 2023-09-30 | Stop reason: HOSPADM

## 2023-09-28 RX ORDER — SODIUM CHLORIDE 9 MG/ML
INJECTION, SOLUTION INTRAVENOUS CONTINUOUS
Status: DISCONTINUED | OUTPATIENT
Start: 2023-09-28 | End: 2023-09-30 | Stop reason: HOSPADM

## 2023-09-28 RX ORDER — DIPHENHYDRAMINE HYDROCHLORIDE 25 MG/1
1 CAPSULE, LIQUID FILLED ORAL ONCE
Qty: 1 KIT | Refills: 0 | Status: SHIPPED | OUTPATIENT
Start: 2023-09-28 | End: 2023-09-28

## 2023-09-28 RX ORDER — SODIUM CHLORIDE 9 MG/ML
INJECTION, SOLUTION INTRAVENOUS PRN
Status: DISCONTINUED | OUTPATIENT
Start: 2023-09-28 | End: 2023-09-30 | Stop reason: HOSPADM

## 2023-09-28 RX ORDER — SODIUM CHLORIDE 9 MG/ML
INJECTION, SOLUTION INTRAVENOUS CONTINUOUS
Status: DISCONTINUED | OUTPATIENT
Start: 2023-09-28 | End: 2023-09-28 | Stop reason: SDUPTHER

## 2023-09-28 RX ORDER — GLUCOSAMINE HCL/CHONDROITIN SU 500-400 MG
1 CAPSULE ORAL
Qty: 100 STRIP | Refills: 3 | Status: SHIPPED | OUTPATIENT
Start: 2023-09-28

## 2023-09-28 RX ADMIN — ASPIRIN 81 MG: 81 TABLET, COATED ORAL at 15:28

## 2023-09-28 RX ADMIN — INSULIN LISPRO 8 UNITS: 100 INJECTION, SOLUTION INTRAVENOUS; SUBCUTANEOUS at 17:20

## 2023-09-28 RX ADMIN — CEFTRIAXONE SODIUM 1000 MG: 1 INJECTION, POWDER, FOR SOLUTION INTRAMUSCULAR; INTRAVENOUS at 02:46

## 2023-09-28 RX ADMIN — SODIUM CHLORIDE: 9 INJECTION, SOLUTION INTRAVENOUS at 12:42

## 2023-09-28 RX ADMIN — CLOPIDOGREL 75 MG: 75 TABLET, FILM COATED ORAL at 12:09

## 2023-09-28 RX ADMIN — EMPAGLIFLOZIN 10 MG: 10 TABLET, FILM COATED ORAL at 15:28

## 2023-09-28 RX ADMIN — DEXTROSE MONOHYDRATE 125 ML: 100 INJECTION, SOLUTION INTRAVENOUS at 06:08

## 2023-09-28 RX ADMIN — SPIRONOLACTONE 25 MG: 25 TABLET ORAL at 15:29

## 2023-09-28 RX ADMIN — INSULIN LISPRO 4 UNITS: 100 INJECTION, SOLUTION INTRAVENOUS; SUBCUTANEOUS at 17:21

## 2023-09-28 RX ADMIN — INSULIN GLARGINE 15 UNITS: 100 INJECTION, SOLUTION SUBCUTANEOUS at 21:15

## 2023-09-28 RX ADMIN — IOPAMIDOL 29 ML: 755 INJECTION, SOLUTION INTRAVENOUS at 11:16

## 2023-09-28 RX ADMIN — ROSUVASTATIN CALCIUM 20 MG: 20 TABLET, FILM COATED ORAL at 21:14

## 2023-09-28 RX ADMIN — Medication 10 ML: at 15:29

## 2023-09-28 RX ADMIN — METOPROLOL SUCCINATE 25 MG: 25 TABLET, EXTENDED RELEASE ORAL at 15:28

## 2023-09-28 RX ADMIN — IRON SUCROSE 200 MG: 20 INJECTION, SOLUTION INTRAVENOUS at 17:07

## 2023-09-28 RX ADMIN — FUROSEMIDE 40 MG: 10 INJECTION, SOLUTION INTRAMUSCULAR; INTRAVENOUS at 17:20

## 2023-09-28 RX ADMIN — SACUBITRIL AND VALSARTAN 1 TABLET: 24; 26 TABLET, FILM COATED ORAL at 21:14

## 2023-09-28 RX ADMIN — HEPARIN SODIUM 5000 UNITS: 5000 INJECTION INTRAVENOUS; SUBCUTANEOUS at 21:14

## 2023-09-28 ASSESSMENT — PAIN SCALES - GENERAL
PAINLEVEL_OUTOF10: 0
PAINLEVEL_OUTOF10: 0

## 2023-09-28 NOTE — PROGRESS NOTES
PATIENT HISTORY    ECHO: DATE: 09/27/23      EF: 25%  STRESS TEST PREFORMED:  No  EKG: Yes    ECG     Result: Normal  Pre CATH Rhythm: Normal Sinus Rhythm  HYPERTENSION: No  DYSLIPIDEMIA: No  FAMILY HX OF CAD: No  PRIOR MI: No  PRIOR PCI: No  PRIOR CABG: No  CEREBROVASCULAR DX: No  PERIPHERAL ARTERIAL DISEASE: No  CHRONIC LUNG DISEASE: No  TOBACCO: Never  DIABETIC: Yes, Diet controlled  CARDIAC ARREST: {No  DIALYSIS: No  HEART FAILURE: No  FRAILTY SCORE: 4 VULNERABLE (while not dependent on others for IADLs, symptoms limit activities)  CARDIAC CTA PREFORMED:  No  [place] CORONARY CALCIUM SCORE:   Assessed: No  Prior Diagnostic Coronary Angioplasty Procedure:   No

## 2023-09-28 NOTE — PLAN OF CARE
Problem: Discharge Planning  Goal: Discharge to home or other facility with appropriate resources  9/27/2023 2317 by Yuliana Isaacs RN  Outcome: Progressing  9/27/2023 1017 by Mayur Oliva RN  Outcome: Progressing  Flowsheets (Taken 9/27/2023 0200 by Jin Carpenter RN)  Discharge to home or other facility with appropriate resources: Identify barriers to discharge with patient and caregiver     Problem: Pain  Goal: Verbalizes/displays adequate comfort level or baseline comfort level  9/27/2023 2317 by Yuliana Isaacs RN  Outcome: Progressing  9/27/2023 1017 by Mayur Oliva RN  Outcome: Progressing     Problem: Safety - Adult  Goal: Free from fall injury  9/27/2023 2317 by Yuliana Isaacs RN  Outcome: Progressing  9/27/2023 1017 by Mayur Oliva RN  Outcome: Progressing     Problem: Chronic Conditions and Co-morbidities  Goal: Patient's chronic conditions and co-morbidity symptoms are monitored and maintained or improved  Outcome: Progressing

## 2023-09-28 NOTE — PROGRESS NOTES
2227: Shift assessment completed. Medications given per MAR. VSS. Bedside table and call light within reach. Lungs clear, bowl sounds active. No BM this evening. , gave lantus and humalog per MAR. The care plan and education has been reviewed and mutually agreed upon with the patient. 0600: Assisted pt to bathroom, reports feeling dizzy, BG 76, BP 90/56. PT is NPO. Messaged hospitalist and was given the ok to give pt 125mL of dextrose.      Paknaj Willson RN

## 2023-09-28 NOTE — PROGRESS NOTES
Pt transferred to [facility] from cath lab. A&o x4. Resp even/unlabored on ra, no sob. No cp/pressure, tele reading NSR. Rt radial TR band patent, no bleeding/hematoma. Pt denies pain, pt needs met. Fall precautions in place.  Bed low, call bell in reach, instructed to call for assist.

## 2023-09-28 NOTE — PROGRESS NOTES
2227: Shift assessment completed. VSS. Patient instructed to reach out if she needs to get out of bed. Medications given per MAR. Bed side table and call light within reach. Whiteboard updated. The care plan and education has been reviewed and mutually agreed upon with the patient.

## 2023-09-28 NOTE — PROGRESS NOTES
Clinical Pharmacy Note    HF Core Measures Assessment    Latest EF: 25%    Entresto/ACE/ARB (EF<40%): Entresto 24-26  Evidence based BB (bisoprolol, metoprolol XL, carvedilol) (EF<40%):  Toprol Xl 25 mg  Aldosterone Antagonist (EF<35%): spironolactone 25 mg   SGLT2 (HFpEF/HFmrEF/HFrEF): empagliflozin 10 mg    (May consider the use of hydralazine + nitrate in patients intolerant to ACEI/ARB)    Froylan Johnson, PharmD, [address] [place] [address] no

## 2023-09-28 NOTE — CARE COORDINATION
Discharge Planning Note:    Talked with Maria Esther Carmona:    - [employer] is running benefits.    - Patient has used [facility] on 08/2022    Will continue to follow.     JOSE E Lao RN    [facility]  Phone: [phone]

## 2023-09-28 NOTE — PROGRESS NOTES
Latest Reference Range & Units 09/28/23 17:02   POC Glucose 70 - 99 mg/dl 450 (H)   (H): Data is abnormally high    MD notified of critical result.

## 2023-09-28 NOTE — PROGRESS NOTES
[facility] Department   Phone: [phone]    Occupational Therapy    [] Initial Evaluation            [x] Daily Treatment Note         [x] Discharge Summary      Patient: Bree Nieves   : 1945   MRN: [id number]   Date of Service:  2023    Admitting Diagnosis:  Hyperglycemia  Current Admission Summary: Bree Nieves is a 70 y.o. female who presents the emergency department today for evaluation for shortness of breath. The patient states that she has been experiencing shortness of breath, and states that this has been ongoing for the past 1 to 2 weeks. The patient states that her shortness of breath is worse with exertion, and lying flat. The patient states that she saw her primary care physician today, she states that she had lab work drawn and was told that her BNP was \"11,000\". Patient states that she was sent to the emergency room for further care and evaluation. The patient states that she does have a history of congestive heart failure however she has not noticed any lower leg pain or swelling. Patient states that she is actually lost weight. Patient denies any chest pain. The patient states that over the past 2 weeks she has had a dry intermittent cough, no sputum production or hemoptysis. Patient states that she would like to be tested for COVID-19 while she is here. Patient denies any fevers. She denies any abdominal pain, nausea, vomiting or diarrhea. No dysuria hematuria. The patient is also diabetic. Patient has no history of DVT or PE but tells me that she recently traveled to and from [place] within the past month. Past Medical History:  has a past medical history of Diabetes mellitus ([address]) and Hypothyroid. Past Surgical History:  has no past surgical history on file. Discharge Recommendations: Tracey Rayoteresa scored a 24/24 on the AM-PAC ADL Inpatient form.  Current research shows that an AM-PAC score of 18 or greater is typically transfer: Independent  Toilet transfer equipment: standard toilet  Comments:  Functional Mobility  Functional Mobility Activity: to/from bathroom  Device Use: no device  Required Assistance: stand by assistance  Comment: assisted with IV pole and line management; pt reacher for doorframe to bathroom 1x, no LOB noted throughout; slow pace  Balance:  Static Sitting Balance: good(+): independent with high level dynamic balance in unsupported position  Dynamic Sitting Balance: good(+): independent with high level dynamic balance in unsupported position  Static Standing Balance: good: independent with functional balance in unsupported position  Dynamic Standing Balance: fair (+): maintains balance at SBA/supervision without use of UE support  Comments:  Other Therapeutic Interventions    Functional Outcomes  AM-PAC Inpatient Daily Activity Raw Score: 23    Cognition  WFL  Comments:    Orientation:    alert and oriented x 4  Command Following:   [facility]     Education  Barriers To Learning: hearing  Patient Education: patient educated on goals, OT role and benefits, plan of care, precautions, ADL adaptive strategies, transfer training, discharge recommendations, bed level TE with exclusion of R UE at times of session d/t temporary reconstruction and IV. Learning Assessment:  patient verbalizes understanding, would benefit from continued reinforcement    Assessment  Activity Tolerance: Good  Impairments Requiring Therapeutic Intervention: decreased functional mobility, decreased endurance, decreased balance  Prognosis: good  Clinical Assessment: Pt presents at [facility] with hyperglycemia and UTI slightly below baseline d/t generalized weakness and decreased endurance. During session pt demonstrated ability to completed BADLs with IND (pt limited within session d/t IV and brace in place with RN requesting to limit movement d/t earlier procedure only- w/o restriction anticipate pt to be fully IND).  patient had good safety awareness

## 2023-09-28 NOTE — PROGRESS NOTES
[facility]    9/28/2023 7:34 AM        Name: Lashonda Crawford . Admitted: 9/26/2023  Primary Care Provider: Patricio Silva MD (Tel: [phone])      Subjective:  . Sought care in the ED due to shortness of breath. hyperglycemia in ED BG was 694 in ED   Had BG > 400 in pcp office 2 days prior to admission. She does not check fingersticks at home  AIC > 13 therefore hyperglycemia is not new      ? UTI:  took antibiotics prior admission   Reports weight loss and urinary frequency likely due to her hyperglycemia    No current reports of pain  Stopped taking her SGLt 2 several months ago   NPO for [facility] today.   She is in good spirits  No reports of chest pain or dyspnea       Reviewed interval ancillary notes    Current Medications  aspirin EC tablet 81 mg, Daily  clopidogrel (PLAVIX) tablet 75 mg, Daily  levothyroxine (SYNTHROID) tablet 50 mcg, QAM AC  metoprolol succinate (TOPROL XL) extended release tablet 25 mg, Daily  rosuvastatin (CRESTOR) tablet 20 mg, Nightly  sodium chloride flush 0.9 % injection 5-40 mL, 2 times per day  sodium chloride flush 0.9 % injection 5-40 mL, PRN  0.9 % sodium chloride infusion, PRN  heparin (porcine) injection 5,000 Units, BID  ondansetron (ZOFRAN-ODT) disintegrating tablet 4 mg, Q8H PRN   Or  ondansetron (ZOFRAN) injection 4 mg, Q6H PRN  polyethylene glycol (GLYCOLAX) packet 17 g, Daily PRN  acetaminophen (TYLENOL) tablet 650 mg, Q6H PRN   Or  acetaminophen (TYLENOL) suppository 650 mg, Q6H PRN  dextrose bolus 10% 125 mL, PRN   Or  dextrose bolus 10% 250 mL, PRN  glucagon injection 1 mg, PRN  dextrose 10 % infusion, Continuous PRN  insulin lispro (HUMALOG) injection vial 0-8 Units, TID WC  insulin lispro (HUMALOG) injection vial 0-4 Units, Nightly  cefTRIAXone (ROCEPHIN) 1,000 mg in sodium chloride 0.9 % 50 mL IVPB (mini-bag), Q24H  empagliflozin (JARDIANCE) tablet 10 mg, Daily  sacubitril-valsartan (ENTRESTO) 24-26 MG 09/28/23 02:50 09/28/23 06:00 09/28/23 07:23   POC Glucose 70 - 99 mg/dl 358 (H) 484 (H) 126 (H) 76 106 (H)     AIC 13.6    ECHO 9/27/23   Summary   This is a STAT echocardiogram per Dr. Tory Araiza for heart failure, elevated   troponin, abnormal EKG, and possible MI. Dr. Tory Araiza bedside during study. Definity was used to assist with endocardial border delineation. Normal left ventricular wall thickness. Mildly dilated left ventricular   cavity size. Severely diminished left ventricular systolic function with an   estimated ejection fraction of 25%. Severe global hypokinesis with regional   variations. No evidence of apical thrombus with Definity. Grade III diastolic dysfunction with elevated LV filling pressures. The right ventricle is normal in size with borderline systolic function. Small mobile structure of unknown etiology noted on the mitral valve, cannot   exclude torn chordae tendineae, vegetation, or thrombus. Mild to moderate   mitral regurgitation. Mild to moderate tricuspid regurgitation with an estimated PASP of 33 mmHg. Trivial circumferential pericardial effusion. Summary  ECHO:  March 2023    -Overall left ventricular systolic function is mildly depressed .   -Ejection fraction is visually estimated to be 40-45 %. E/e'= 10.5   -There is mild diffuse hypokinesis. - Grade II diastolic dysfunction with elevated LV filling pressures. -Mitral annular calcification is present. Mild calcification of the anterior   leaflet of the mitral valve. Trivial mitral regurgitation.   -The aortic valve appears sclerotic but opens well   -Mild to moderate tricuspid regurgitation.   -Systolic pulmonary artery pressure (SPAP) estimated at 32 mmHg (RA pressure   mmHg. .   -The inferior vena cava appears normal in size with normal respiratory   variation      Signature     Cardiac Cath PCI: 8/10/2022  Dr Iram Alexander  Anatomy:   LM-nml   LAD-mid 95%  Cx-nml  OM- nml  RCA-dom nml  RPDA- nml  LVEDP- 1

## 2023-09-29 PROBLEM — I50.43 ACUTE ON CHRONIC COMBINED SYSTOLIC AND DIASTOLIC HEART FAILURE (HCC): Status: ACTIVE | Noted: 2023-09-29

## 2023-09-29 PROBLEM — I21.4 NSTEMI (NON-ST ELEVATED MYOCARDIAL INFARCTION) (HCC): Status: ACTIVE | Noted: 2023-09-29

## 2023-09-29 LAB
ANION GAP SERPL CALCULATED.3IONS-SCNC: 9 MMOL/L (ref 3–16)
BASOPHILS # BLD: 0 K/UL (ref 0–0.2)
BASOPHILS NFR BLD: 0.6 %
BUN SERPL-MCNC: 36 MG/DL (ref 7–20)
CALCIUM SERPL-MCNC: 8.5 MG/DL (ref 8.3–10.6)
CHLORIDE SERPL-SCNC: 103 MMOL/L (ref 99–110)
CO2 SERPL-SCNC: 21 MMOL/L (ref 21–32)
CREAT SERPL-MCNC: 1 MG/DL (ref 0.6–1.2)
DEPRECATED RDW RBC AUTO: 12.6 % (ref 12.4–15.4)
EKG ATRIAL RATE: 60 BPM
EKG DIAGNOSIS: NORMAL
EKG P AXIS: 34 DEGREES
EKG P-R INTERVAL: 136 MS
EKG Q-T INTERVAL: 448 MS
EKG QRS DURATION: 82 MS
EKG QTC CALCULATION (BAZETT): 448 MS
EKG R AXIS: -10 DEGREES
EKG T AXIS: 151 DEGREES
EKG VENTRICULAR RATE: 60 BPM
EOSINOPHIL # BLD: 0.4 K/UL (ref 0–0.6)
EOSINOPHIL NFR BLD: 4.8 %
GFR SERPLBLD CREATININE-BSD FMLA CKD-EPI: 58 ML/MIN/{1.73_M2}
GLUCOSE BLD-MCNC: 107 MG/DL (ref 70–99)
GLUCOSE BLD-MCNC: 132 MG/DL (ref 70–99)
GLUCOSE BLD-MCNC: 240 MG/DL (ref 70–99)
GLUCOSE BLD-MCNC: 315 MG/DL (ref 70–99)
GLUCOSE BLD-MCNC: 60 MG/DL (ref 70–99)
GLUCOSE BLD-MCNC: 73 MG/DL (ref 70–99)
GLUCOSE BLD-MCNC: 92 MG/DL (ref 70–99)
GLUCOSE SERPL-MCNC: 220 MG/DL (ref 70–99)
HCT VFR BLD AUTO: 29.3 % (ref 36–48)
HGB BLD-MCNC: 10 G/DL (ref 12–16)
LYMPHOCYTES # BLD: 1.3 K/UL (ref 1–5.1)
LYMPHOCYTES NFR BLD: 18.1 %
MCH RBC QN AUTO: 30.5 PG (ref 26–34)
MCHC RBC AUTO-ENTMCNC: 34.1 G/DL (ref 31–36)
MCV RBC AUTO: 89.4 FL (ref 80–100)
MONOCYTES # BLD: 0.7 K/UL (ref 0–1.3)
MONOCYTES NFR BLD: 9.3 %
NEUTROPHILS # BLD: 5 K/UL (ref 1.7–7.7)
NEUTROPHILS NFR BLD: 67.2 %
NT-PROBNP SERPL-MCNC: 2969 PG/ML (ref 0–449)
PERFORMED ON: ABNORMAL
PERFORMED ON: NORMAL
PERFORMED ON: NORMAL
PLATELET # BLD AUTO: 263 K/UL (ref 135–450)
PMV BLD AUTO: 8.2 FL (ref 5–10.5)
POTASSIUM SERPL-SCNC: 5 MMOL/L (ref 3.5–5.1)
RBC # BLD AUTO: 3.27 M/UL (ref 4–5.2)
SODIUM SERPL-SCNC: 133 MMOL/L (ref 136–145)
WBC # BLD AUTO: 7.4 K/UL (ref 4–11)

## 2023-09-29 PROCEDURE — 93325 DOPPLER ECHO COLOR FLOW MAPG: CPT

## 2023-09-29 PROCEDURE — 83880 ASSAY OF NATRIURETIC PEPTIDE: CPT

## 2023-09-29 PROCEDURE — 80048 BASIC METABOLIC PNL TOTAL CA: CPT

## 2023-09-29 PROCEDURE — 6360000002 HC RX W HCPCS: Performed by: STUDENT IN AN ORGANIZED HEALTH CARE EDUCATION/TRAINING PROGRAM

## 2023-09-29 PROCEDURE — 93010 ELECTROCARDIOGRAM REPORT: CPT | Performed by: INTERNAL MEDICINE

## 2023-09-29 PROCEDURE — 85025 COMPLETE CBC W/AUTO DIFF WBC: CPT

## 2023-09-29 PROCEDURE — 7100000010 HC PHASE II RECOVERY - FIRST 15 MIN

## 2023-09-29 PROCEDURE — 6370000000 HC RX 637 (ALT 250 FOR IP): Performed by: INTERNAL MEDICINE

## 2023-09-29 PROCEDURE — 2580000003 HC RX 258: Performed by: STUDENT IN AN ORGANIZED HEALTH CARE EDUCATION/TRAINING PROGRAM

## 2023-09-29 PROCEDURE — B24BZZ4 ULTRASONOGRAPHY OF HEART WITH AORTA, TRANSESOPHAGEAL: ICD-10-PCS | Performed by: HOSPITALIST

## 2023-09-29 PROCEDURE — 6370000000 HC RX 637 (ALT 250 FOR IP): Performed by: NURSE PRACTITIONER

## 2023-09-29 PROCEDURE — 2580000003 HC RX 258: Performed by: INTERNAL MEDICINE

## 2023-09-29 PROCEDURE — 97116 GAIT TRAINING THERAPY: CPT

## 2023-09-29 PROCEDURE — 1200000000 HC SEMI PRIVATE

## 2023-09-29 PROCEDURE — 99152 MOD SED SAME PHYS/QHP 5/>YRS: CPT

## 2023-09-29 PROCEDURE — 93312 ECHO TRANSESOPHAGEAL: CPT

## 2023-09-29 PROCEDURE — 36415 COLL VENOUS BLD VENIPUNCTURE: CPT

## 2023-09-29 PROCEDURE — 97530 THERAPEUTIC ACTIVITIES: CPT

## 2023-09-29 PROCEDURE — 99232 SBSQ HOSP IP/OBS MODERATE 35: CPT | Performed by: NURSE PRACTITIONER

## 2023-09-29 PROCEDURE — 99152 MOD SED SAME PHYS/QHP 5/>YRS: CPT | Performed by: INTERNAL MEDICINE

## 2023-09-29 PROCEDURE — 6360000002 HC RX W HCPCS: Performed by: NURSE PRACTITIONER

## 2023-09-29 PROCEDURE — 6370000000 HC RX 637 (ALT 250 FOR IP): Performed by: STUDENT IN AN ORGANIZED HEALTH CARE EDUCATION/TRAINING PROGRAM

## 2023-09-29 PROCEDURE — 6360000002 HC RX W HCPCS: Performed by: INTERNAL MEDICINE

## 2023-09-29 PROCEDURE — 2580000003 HC RX 258: Performed by: NURSE PRACTITIONER

## 2023-09-29 RX ADMIN — EMPAGLIFLOZIN 10 MG: 10 TABLET, FILM COATED ORAL at 10:28

## 2023-09-29 RX ADMIN — ASPIRIN 81 MG: 81 TABLET, COATED ORAL at 10:28

## 2023-09-29 RX ADMIN — INSULIN LISPRO 4 UNITS: 100 INJECTION, SOLUTION INTRAVENOUS; SUBCUTANEOUS at 19:52

## 2023-09-29 RX ADMIN — METOPROLOL SUCCINATE 25 MG: 25 TABLET, EXTENDED RELEASE ORAL at 10:28

## 2023-09-29 RX ADMIN — SODIUM CHLORIDE 25 ML: 9 INJECTION, SOLUTION INTRAVENOUS at 02:25

## 2023-09-29 RX ADMIN — Medication 10 ML: at 10:28

## 2023-09-29 RX ADMIN — Medication 10 ML: at 19:53

## 2023-09-29 RX ADMIN — FUROSEMIDE 40 MG: 10 INJECTION, SOLUTION INTRAMUSCULAR; INTRAVENOUS at 18:02

## 2023-09-29 RX ADMIN — HEPARIN SODIUM 5000 UNITS: 5000 INJECTION INTRAVENOUS; SUBCUTANEOUS at 19:53

## 2023-09-29 RX ADMIN — ROSUVASTATIN CALCIUM 20 MG: 20 TABLET, FILM COATED ORAL at 19:53

## 2023-09-29 RX ADMIN — LEVOTHYROXINE SODIUM 50 MCG: 0.03 TABLET ORAL at 06:09

## 2023-09-29 RX ADMIN — SACUBITRIL AND VALSARTAN 1 TABLET: 24; 26 TABLET, FILM COATED ORAL at 10:28

## 2023-09-29 RX ADMIN — INSULIN LISPRO 2 UNITS: 100 INJECTION, SOLUTION INTRAVENOUS; SUBCUTANEOUS at 18:02

## 2023-09-29 RX ADMIN — INSULIN GLARGINE 15 UNITS: 100 INJECTION, SOLUTION SUBCUTANEOUS at 19:51

## 2023-09-29 RX ADMIN — INSULIN LISPRO 4 UNITS: 100 INJECTION, SOLUTION INTRAVENOUS; SUBCUTANEOUS at 14:37

## 2023-09-29 RX ADMIN — IRON SUCROSE 200 MG: 20 INJECTION, SOLUTION INTRAVENOUS at 19:58

## 2023-09-29 RX ADMIN — CEFTRIAXONE SODIUM 1000 MG: 1 INJECTION, POWDER, FOR SOLUTION INTRAMUSCULAR; INTRAVENOUS at 02:29

## 2023-09-29 RX ADMIN — INSULIN LISPRO 4 UNITS: 100 INJECTION, SOLUTION INTRAVENOUS; SUBCUTANEOUS at 18:02

## 2023-09-29 RX ADMIN — CLOPIDOGREL 75 MG: 75 TABLET, FILM COATED ORAL at 10:28

## 2023-09-29 RX ADMIN — SODIUM CHLORIDE 25 ML: 9 INJECTION, SOLUTION INTRAVENOUS at 19:57

## 2023-09-29 RX ADMIN — SACUBITRIL AND VALSARTAN 1 TABLET: 24; 26 TABLET, FILM COATED ORAL at 19:53

## 2023-09-29 RX ADMIN — SODIUM CHLORIDE, PRESERVATIVE FREE 10 ML: 5 INJECTION INTRAVENOUS at 02:29

## 2023-09-29 ASSESSMENT — PAIN SCALES - GENERAL
PAINLEVEL_OUTOF10: 0

## 2023-09-29 NOTE — PROGRESS NOTES
Patient's radial cath site assessed. Site soft and free from hematoma. Patient with no questions at this time.

## 2023-09-29 NOTE — PROGRESS NOTES
[facility] Department   Phone: [phone]    Physical Therapy    [] Initial Evaluation            [x] Daily Treatment Note         [] Discharge Summary      Patient: Albino Faustin   : 1945   MRN: [id number]   Date of Service:  2023  Admitting Diagnosis: Hyperglycemia  Current Admission Summary: Pt to ED for SOB. PCP notified pt of abnormal lab values. Past Medical History:  has a past medical history of Diabetes mellitus ([address]) and Hypothyroid. Past Surgical History:  has no past surgical history on file. Discharge Recommendations: Carlyon Sandifer Paturi scored a 20/24 on the AM-PAC short mobility form. Current research shows that an AM-PAC score of 18 or greater is typically associated with a discharge to the patient's home setting. Based on the patient's AM-PAC score and their current functional mobility deficits, it is recommended that the patient have 2-3 sessions per week of Physical Therapy at d/c to increase the patient's independence. At this time, this patient demonstrates the endurance and safety to discharge home with HHPT and a follow up treatment frequency of 2-3x/wk. Please see assessment section for further patient specific details. HOME HEALTH CARE: LEVEL 1 STANDARD  - Initial home health evaluation to occur within 24-48 hours, in patient home   - Therapy to evaluate with goal of regaining prior level of functioning   - Therapy to evaluate if patient has [address] needs for personal care     If patient discharges prior to next session this note will serve as a discharge summary. Please see below for the latest assessment towards goals.       DME Required For Discharge: patient has all required DME for discharge  Precautions/Restrictions: high fall risk  Weight Bearing Restrictions: no restrictions  [] Right Upper Extremity  [] Left Upper Extremity [] Right Lower Extremity  [] Left Lower Extremity     Required Braces/Orthotics: no braces benefits, plan of care, general safety, functional mobility training, proper use of assistive device/equipment, discharge recommendations  Learning Assessment:  patient verbalizes and demonstrates understanding    Assessment  Activity Tolerance: Good  Impairments Requiring Therapeutic Intervention: decreased functional mobility, decreased strength, decreased endurance, decreased balance  Prognosis: good  Clinical Assessment: Pt limited by fatigue and feeling \"shaky\" today, moving slowly, but did not demonstrate any loss of balance during ambulation or bathroom activities. She required SBA to CGA for gait, transfers, and standing balance tasks. Pt is below functional baseline and would benefit from continued skilled PT to maximize safe functional independence. Safety Interventions: patient left in bed, bed alarm in place, call light within reach, gait belt, patient at risk for falls, and nurse notified    Plan  Frequency: 3-5 x/per week  Current Treatment Recommendations: strengthening, balance training, functional mobility training, transfer training, gait training, stair training, endurance training, neuromuscular re-education, modalities, patient/caregiver education, home exercise program, safety education, equipment evaluation/education, and positioning    Goals  Patient Goals: to get stronger   Short Term Goals:  Time Frame: upon d/c  Short term goal 1: Pt will perform bed mobility MOD I   Short term goal 2: Pt will perform transfers with LRAD MOD I   Short term goal 3: Pt will ambulate 150' with LRAD MOD I   Short term goal 4: Pt will negotiate 2 curb steps with LRAD and supervision  Short term goal 5: Pt will negotiate 13 steps with HR and supervision     Above goals reviewed on 9/29/2023. All goals are ongoing at this time unless indicated above.       Therapy Session Time      Individual Group Co-treatment   Time In 0929       Time Out 1008       Minutes 39         Timed Code Treatment Minutes:  44 Minutes  Total Treatment Minutes:  39 minutes       Electronically Signed By: Irasema Senior, PT, DPT [id number]

## 2023-09-29 NOTE — PROGRESS NOTES
CLINICAL PHARMACY NOTE: MEDS TO BEDS    Total # of Prescriptions Filled: 4   The following medications were delivered to the patient:  ONETOUCH LANCETS  ALCOHOL PADS  [address] TEST STRIPS    Additional Documentation:  Delivered to patients room = signed   [facility] to be delivered per RN Julio Villafana

## 2023-09-29 NOTE — PROGRESS NOTES
Gave the patient basal bolus pictoral with doses she is getting here. Reviewed these doses, reviewed blood sugar management, reviewed 4-5 CCC diet. Given handouts. Multiple questions answered. Tl Shah RN, BSN, Daisha Roberto.

## 2023-09-29 NOTE — PROGRESS NOTES
[facility]  HEART FAILURE  Progress Note      Admit Date 9/26/2023     Reason for Consult:      Reason for Consultation/Chief Complaint: SOB    HPI:    Jesus French is a 70 y.o. female with PMH CAD, ICM, HFrEF, DM admitted with SOB, drop in EF and had LHC yesterday with PCI to LAD. Subjective:  Patient is being seen for CHF. There were no acute overnight cardiac events. Today Ms. Jay Bello c/o weakness but denies chest pain, shortness of breath, palpitations, or dizziness  I spent 10 minutes educating the patient on heart failure, medications, lifestyle modifications and dietary guidelines. Review of Systems - General ROS: positive for  - weakness  Respiratory ROS: no cough, shortness of breath, or wheezing  Cardiovascular ROS: no chest pain or dyspnea on exertion  Gastrointestinal ROS: no abdominal pain, change in bowel habits, or black or bloody stools  Musculoskeletal ROS: negative  Neurological ROS: no TIA or stroke symptoms     Baseline Weight: uk, has lost wt   Wt Readings from Last 3 Encounters:   09/29/23 83 lb 4.8 oz (37.8 kg)   09/25/23 86 lb (39 kg)   05/30/23 91 lb (41.3 kg)         Cardiac Testing:   Echo 9/27/2023   Summary   This is a STAT echocardiogram per Dr. Saunders Read for heart failure, elevated   troponin, abnormal EKG, and possible MI. Dr. Saunders Read bedside during study. Definity was used to assist with endocardial border delineation. Normal left ventricular wall thickness. Mildly dilated left ventricular   cavity size. Severely diminished left ventricular systolic function with an   estimated ejection fraction of 25%. Severe global hypokinesis with regional   variations. No evidence of apical thrombus with Definity. Grade III diastolic dysfunction with elevated LV filling pressures. The right ventricle is normal in size with borderline systolic function.    Small mobile structure of unknown etiology noted on the mitral valve, cannot   exclude torn chordae tendineae, vegetation,

## 2023-09-29 NOTE — PROGRESS NOTES
Pt blood sugar 60 at 0214, pt given glass of juice, f/u blood sugar 107. Pt then consumed some jennifer crackers as well. On-call NP notified, Venancio Lucas.

## 2023-09-29 NOTE — PROGRESS NOTES
Brief immediate postprocedure note       SHARMIN performed w/o complications      Anesthesia: 1% viscous lidocaine, cetacaine spray   Sedation: 1 mg IV versed   Analgesia: 25 mcg IV fentanyl     Probe inserted easily and removed easily   Findings: Very dilated LV with severely depressed LVEF. No vegetations. At most mild MR. Complications: none   EBL: none   Specimens: N/A       CONSCIOUS SEDATION: Conscious sedation was given. The medication documented above was administered during the study. There was a trained observer present throughout the   entire time during which sedation was administered. Moderate sedation time   was 9 minutes.

## 2023-09-29 NOTE — PROGRESS NOTES
at 32 mmHg (RA pressure   mmHg. .   -The inferior vena cava appears normal in size with normal respiratory   variation      Signature     Cardiac Cath PCI: 8/10/2022  Dr Gabriel Pro  Anatomy:   LM-nml   LAD-mid 95%  Cx-nml  OM- nml  RCA-dom nml  RPDA- nml  LVEDP- 1     Intervention  ~Successful PCI to LAD with 2.25x18 ELIAS. Excellent Result. CT abd pelvis 9/27/2023    IMPRESSION:  No convincing evidence of neoplasm within the abdomen pelvis. There is mild urinary bladder mural thickening, greater anteriorly. That is  probably secondary to incomplete distention, but neoplasm is not entirely  excluded. Consider direct visualization. Elevated stool burden. Please correlate with clinical evidence of  constipation. Very small bilateral pleural effusions. Problem List  Principal Problem:    Hyperglycemia  Resolved Problems:    * No resolved hospital problems. *       Assessment & Plan:   Hyperglycemia: uncontrolled with AIC 13.6,  I have added C peptide and GADA,   BG values reviewed. Continue with lantus 15 units at hs,  and 4 units of prandial with correction. needs glucometer prior to d/c home . I explained that she will need to be d/c on basal bolus therapy . PT currently NPO for SHARMIN   Urine culture with low CC of normal  marizol.   stop Rocephin,    Acute on Chronic systolic HF with EF down to 25 %, on entresto, BB, SGLT 2 aldactone also added per cardiology, however I held today due to K of 5.0 ,  BNP trending down   Possible vegetation on MV:   discussed with cardiology needs SHARMIN   CAD: with LAD stent placed in Aug 2022 and also had LAD stent placed on 9/28/23  on ASA, statin and plavix   Poor dentition:  needs to see dentist ASAP   Weight loss: likely due to poorly controlled diabetes, CT abd did not suggest any malignancy       Diet: Diet NPO Exceptions are: Sips of Water with Meds  Code:Full Code  DVT PPX      Govind Crew, APRN - CNP   9/29/2023 7:41 AM

## 2023-09-29 NOTE — PROGRESS NOTES
CATH LAB PROCEDURE LOG - TRANSESOPHAGEAL ECHOCARDIOGRAM    PRE PROCEDURE    DATE: 9/29/2023 ARRIVAL TO CATH LAB: 12:22 PM    ID & ALLERGY BAND: On    CONSENT: Yes    NPO SINCE: Midnight    IV SITE: Patent in R arm. Pt arrived to Cath Lab. Plan of Care: Hemodynamics and cardiac rhythm will remain stable. Comfort level will be maintained. Respiratory function will remain adequate. Pt/family will verbalize understanding of the procedure. Procedure will be tolerated without complications. Patient will recover from procedure without complications. ID armband on patient and identification verified. Informed consent obtained. Non invasive blood pressure cuff applied, monitoring initiated. EKG pads and pulse oximeter applied, monitoring initiated. Instructions given. Patient and / or family verbalize understanding. H&P will be documented by physician in [address] [place] [address]. Pt has been NPO since midnight. TRANSESOPHAGEAL ECHOCARDIOGRAM    Timeout and fire safety completed. TIMEOUT TIME: 12:27 PM    CORRECT PATIENT VERIFIED. MEMBERS OF THE SURGICAL TEAM/VISITORS INTRODUCED. ALLERGIES ANNOUNCED. CORRECT PROCEDURE VERIFIED. CORRECT PROCEDURAL SITE VERIFIED. CONSENTS VERIFIED. IMPLANT EQUIPMENT, ADDITIONAL SERVICES, SPECIAL REQUIREMENTS AVAILABLE. MEDICATIONS LABELED AND AVAILABLE. APPROPRIATE PRE MEDS HAVE BEEN ADMINISTERED. FIRE SAFETY: ALCOHOL PREP SOLUTION HAD SUFFICIENT TIME TO DISSIPATE IF USED. FIRE SAFETY: SURGICAL SITE OR INCISION ABOVE THE XIPHOID. YES=1, NO=0. FIRE SAFETY: OPEN OXYGEN SOURCE. YES=1, NO=0. FIRE SAFETY: AVAILABLE IGNITION SOURCE. YES=1, NO=0. FIRE SAFETY: SCORE TOTAL = 1.      Viscous Lidocaine administered per verbal order : Yes 12:28 PM     Cetacaine spray administered per verbal order: Yes 12:28 PM     Procedural sedation administered per verbal order protocol via Dr. Denise Mo      Medications verified by Samantha Munoz RN    12:30 PM - Versed 0.5 mg IV  12:30 PM - Versed 0.5 mg IV      12:33 PM - Fentanyl 25 mcg IV      SHARMIN probe passed and images obtained. SHARMIN probe removed without incident. 12:39 PM Pt waking up, respirations spontaneous, able to converse appropriately, follows commands, resting quietly. POST PROCEDURE       Report given to floor nurse. Pt transferred to room 5914. Tele monitor is on and verified. Pt. transferred in stable condition.

## 2023-09-30 LAB
ALBUMIN SERPL-MCNC: 3.2 G/DL (ref 3.4–5)
ANION GAP SERPL CALCULATED.3IONS-SCNC: 11 MMOL/L (ref 3–16)
BASOPHILS # BLD: 0 K/UL (ref 0–0.2)
BASOPHILS NFR BLD: 0.6 %
BUN SERPL-MCNC: 49 MG/DL (ref 7–20)
CALCIUM SERPL-MCNC: 8.9 MG/DL (ref 8.3–10.6)
CHLORIDE SERPL-SCNC: 98 MMOL/L (ref 99–110)
CO2 SERPL-SCNC: 22 MMOL/L (ref 21–32)
CREAT SERPL-MCNC: 1.4 MG/DL (ref 0.6–1.2)
DEPRECATED RDW RBC AUTO: 12.7 % (ref 12.4–15.4)
EOSINOPHIL # BLD: 0.5 K/UL (ref 0–0.6)
EOSINOPHIL NFR BLD: 6.5 %
GFR SERPLBLD CREATININE-BSD FMLA CKD-EPI: 39 ML/MIN/{1.73_M2}
GLUCOSE BLD-MCNC: 152 MG/DL (ref 70–99)
GLUCOSE BLD-MCNC: 172 MG/DL (ref 70–99)
GLUCOSE BLD-MCNC: 198 MG/DL (ref 70–99)
GLUCOSE BLD-MCNC: 90 MG/DL (ref 70–99)
GLUCOSE SERPL-MCNC: 100 MG/DL (ref 70–99)
HCT VFR BLD AUTO: 30.7 % (ref 36–48)
HGB BLD-MCNC: 10.4 G/DL (ref 12–16)
LYMPHOCYTES # BLD: 1.8 K/UL (ref 1–5.1)
LYMPHOCYTES NFR BLD: 22.8 %
MCH RBC QN AUTO: 30.4 PG (ref 26–34)
MCHC RBC AUTO-ENTMCNC: 33.8 G/DL (ref 31–36)
MCV RBC AUTO: 89.9 FL (ref 80–100)
MONOCYTES # BLD: 0.7 K/UL (ref 0–1.3)
MONOCYTES NFR BLD: 8.7 %
NEUTROPHILS # BLD: 4.7 K/UL (ref 1.7–7.7)
NEUTROPHILS NFR BLD: 61.4 %
PERFORMED ON: ABNORMAL
PERFORMED ON: NORMAL
PHOSPHATE SERPL-MCNC: 6.6 MG/DL (ref 2.5–4.9)
PLATELET # BLD AUTO: 280 K/UL (ref 135–450)
PMV BLD AUTO: 7.9 FL (ref 5–10.5)
POTASSIUM SERPL-SCNC: 4.3 MMOL/L (ref 3.5–5.1)
RBC # BLD AUTO: 3.42 M/UL (ref 4–5.2)
SODIUM SERPL-SCNC: 131 MMOL/L (ref 136–145)
WBC # BLD AUTO: 7.7 K/UL (ref 4–11)

## 2023-09-30 PROCEDURE — 2580000003 HC RX 258: Performed by: NURSE PRACTITIONER

## 2023-09-30 PROCEDURE — 6360000002 HC RX W HCPCS: Performed by: STUDENT IN AN ORGANIZED HEALTH CARE EDUCATION/TRAINING PROGRAM

## 2023-09-30 PROCEDURE — 99232 SBSQ HOSP IP/OBS MODERATE 35: CPT | Performed by: NURSE PRACTITIONER

## 2023-09-30 PROCEDURE — 6360000002 HC RX W HCPCS: Performed by: NURSE PRACTITIONER

## 2023-09-30 PROCEDURE — 6370000000 HC RX 637 (ALT 250 FOR IP): Performed by: NURSE PRACTITIONER

## 2023-09-30 PROCEDURE — 85025 COMPLETE CBC W/AUTO DIFF WBC: CPT

## 2023-09-30 PROCEDURE — 36415 COLL VENOUS BLD VENIPUNCTURE: CPT

## 2023-09-30 PROCEDURE — 6370000000 HC RX 637 (ALT 250 FOR IP): Performed by: STUDENT IN AN ORGANIZED HEALTH CARE EDUCATION/TRAINING PROGRAM

## 2023-09-30 PROCEDURE — 80069 RENAL FUNCTION PANEL: CPT

## 2023-09-30 PROCEDURE — 6370000000 HC RX 637 (ALT 250 FOR IP): Performed by: INTERNAL MEDICINE

## 2023-09-30 PROCEDURE — 2580000003 HC RX 258: Performed by: STUDENT IN AN ORGANIZED HEALTH CARE EDUCATION/TRAINING PROGRAM

## 2023-09-30 PROCEDURE — 2580000003 HC RX 258: Performed by: INTERNAL MEDICINE

## 2023-09-30 RX ORDER — METOPROLOL SUCCINATE 25 MG/1
12.5 TABLET, EXTENDED RELEASE ORAL DAILY
Qty: 30 TABLET | Refills: 3 | Status: SHIPPED | OUTPATIENT
Start: 2023-10-01

## 2023-09-30 RX ORDER — INSULIN LISPRO 100 [IU]/ML
4 INJECTION, SOLUTION INTRAVENOUS; SUBCUTANEOUS
Qty: 5 ADJUSTABLE DOSE PRE-FILLED PEN SYRINGE | Refills: 2 | Status: SHIPPED | OUTPATIENT
Start: 2023-09-30

## 2023-09-30 RX ORDER — FUROSEMIDE 20 MG/1
10 TABLET ORAL DAILY
Qty: 30 TABLET | Refills: 3 | Status: SHIPPED | OUTPATIENT
Start: 2023-10-02

## 2023-09-30 RX ORDER — FUROSEMIDE 20 MG/1
20 TABLET ORAL DAILY
Qty: 60 TABLET | Refills: 3 | Status: SHIPPED | OUTPATIENT
Start: 2023-09-30 | End: 2023-09-30 | Stop reason: SDUPTHER

## 2023-09-30 RX ORDER — INSULIN GLARGINE 100 [IU]/ML
15 INJECTION, SOLUTION SUBCUTANEOUS NIGHTLY
Qty: 15 ADJUSTABLE DOSE PRE-FILLED PEN SYRINGE | Refills: 3 | Status: SHIPPED | OUTPATIENT
Start: 2023-09-30

## 2023-09-30 RX ADMIN — ASPIRIN 81 MG: 81 TABLET, COATED ORAL at 10:48

## 2023-09-30 RX ADMIN — HEPARIN SODIUM 5000 UNITS: 5000 INJECTION INTRAVENOUS; SUBCUTANEOUS at 10:48

## 2023-09-30 RX ADMIN — OXYCODONE HYDROCHLORIDE AND ACETAMINOPHEN 1 TABLET: 5; 325 TABLET ORAL at 02:40

## 2023-09-30 RX ADMIN — CLOPIDOGREL 75 MG: 75 TABLET, FILM COATED ORAL at 10:48

## 2023-09-30 RX ADMIN — Medication 10 ML: at 10:50

## 2023-09-30 RX ADMIN — IRON SUCROSE 200 MG: 20 INJECTION, SOLUTION INTRAVENOUS at 12:15

## 2023-09-30 RX ADMIN — INSULIN LISPRO 4 UNITS: 100 INJECTION, SOLUTION INTRAVENOUS; SUBCUTANEOUS at 10:49

## 2023-09-30 RX ADMIN — LEVOTHYROXINE SODIUM 50 MCG: 0.03 TABLET ORAL at 06:25

## 2023-09-30 RX ADMIN — INSULIN LISPRO 4 UNITS: 100 INJECTION, SOLUTION INTRAVENOUS; SUBCUTANEOUS at 18:18

## 2023-09-30 RX ADMIN — EMPAGLIFLOZIN 10 MG: 10 TABLET, FILM COATED ORAL at 10:52

## 2023-09-30 ASSESSMENT — PAIN SCALES - GENERAL
PAINLEVEL_OUTOF10: 0
PAINLEVEL_OUTOF10: 5

## 2023-09-30 ASSESSMENT — PAIN DESCRIPTION - ORIENTATION: ORIENTATION: RIGHT

## 2023-09-30 ASSESSMENT — PAIN - FUNCTIONAL ASSESSMENT: PAIN_FUNCTIONAL_ASSESSMENT: PREVENTS OR INTERFERES SOME ACTIVE ACTIVITIES AND ADLS

## 2023-09-30 ASSESSMENT — PAIN DESCRIPTION - FREQUENCY: FREQUENCY: INTERMITTENT

## 2023-09-30 ASSESSMENT — PAIN DESCRIPTION - ONSET: ONSET: ON-GOING

## 2023-09-30 ASSESSMENT — PAIN DESCRIPTION - LOCATION: LOCATION: TEETH

## 2023-09-30 ASSESSMENT — PAIN DESCRIPTION - DESCRIPTORS: DESCRIPTORS: ACHING

## 2023-09-30 ASSESSMENT — PAIN DESCRIPTION - PAIN TYPE: TYPE: ACUTE PAIN

## 2023-09-30 NOTE — DISCHARGE INSTRUCTIONS
Radial Angiogram      Care of your puncture site:  Remove clear bandage 24 hours after the procedure. May shower in 24 hours  Inspect the site daily and gently clean using soap and water. Dry thoroughly and apply a Band-Aid. Normal Observations:  Soreness or tenderness which may last one week. Mild oozing from the incision site. Possible bruising that could last 2 weeks. Activity:  You may resume driving 24 hours following the procedure. You may resume normal activity in 3 days or after the wound heals. Avoid lifting more than 10 pounds for 3 days with affected arm. Nutrition:  Regular diet or low sodium. Drink at least 8 to 10 glasses of decaffeinated, non-alcoholic fluid for the next 24 hours to flush the x-ray dye used for your angiogram out of your body. Call your doctor immediately if your condition worsens, for any other concerns, for a follow-up appointment or if you experience any of the following:  Significant bleeding that does not stop after 10 minutes of applying firm pressure on the puncture site. Increased swelling of the wrist.  Unusual pain, numbness, or tingling of the wrist/arm. Any signs of infection such as: redness, yellow drainage at the site, swelling or pain. Other Instructions:  Hold Metformin or Metformin containing drugs for 48 hours after procedure.

## 2023-09-30 NOTE — DISCHARGE INSTR - COC
Continuity of Care Form    Patient Name: Marlen Estevez   :  1945  MRN:  [id number]    Admit date:  2023  Discharge date:  2023    Code Status Order: Full Code   Advance Directives:     Admitting Physician:  Deepika Connor MD  PCP: Brittany Capps MD    Discharging Nurse: Thang Hollingsworth RN  Critical access hospital Unit/Room#: G1S-7627/7254-14  Discharging Unit Phone Number: [phone]    Emergency Contact:   Extended Emergency Contact Information  Primary Emergency Contact: Mahin Gooden  Address: [address] [facility], [address] [place] [address]  Home Phone: [phone]  Relation: Spouse    Past Surgical History:  History reviewed. No pertinent surgical history.     Immunization History:   Immunization History   Administered Date(s) Administered    COVID-19, J&J, (age 18y+), IM, 0.5 mL 2021    COVID-19, PFIZER GRAY top, DO NOT Dilute, (age 15 y+), IM, 30 mcg/0.3 mL 2022    COVID-19, PFIZER PURPLE top, DILUTE for use, (age 15 y+), 30mcg/0.3mL 10/25/2021    Hep A, HAVRIX, VAQTA, (age 17m-24y), IM, 0.5mL 10/30/2009    Hep A, HAVRIX, VAQTA, (age 19y+), IM, 1mL 2010    Influenza A ([id number]) Vaccine PF IM 10/30/2009    Influenza, FLUZONE (age 72 y+), High Dose, 0.7mL 2022    Poliovirus, IPOL, (age 6w+), SC/IM, 0.5mL 10/30/2009    Td vaccine (adult) 10/30/2009    Zoster Recombinant (Shingrix) 10/07/2022       Active Problems:  Patient Active Problem List   Diagnosis Code    CHF (congestive heart failure) ([facility]) I50.9    DM2 (diabetes mellitus, type 2) ([facility]) E11.9    Elevated liver function tests R79.89    Anemia D64.9    Bilateral pleural effusion J90    Acute on chronic heart failure, unspecified heart failure type ([address]) I50.9    Acute respiratory failure with hypoxia (HCC) J96.01    Suspected COVID-19 virus infection Z20.822    Acute pulmonary edema (HCC) J81.0    Shortness of breath F43.94    Acute systolic (congestive) heart failure (HCC) F36.34    Acute systolic CHF

## 2023-09-30 NOTE — DISCHARGE SUMMARY
[address]  HOSPITALISTS DISCHARGE SUMMARY    Patient Demographics    Patient. Tracey Gooden  Date of Birth. 1945  MRN. [id number]     Primary care provider. Renee Whitt MD  (Tel: [phone])    Admit date: 9/26/2023    Discharge date 9/30/2023  Note Date: 9/30/2023     Reason for Hospitalization. Chief Complaint   Patient presents with    Shortness of Breath     Pt arrives to ED via self c/o abnormal labs that were taken this morning, states her PCP called about a heart level (BNP) that was >11,000. Pt states hr PCP told her to come to the ER. PT c/o \"huffing and puffing\" when she walks. Abnormal Lab         Significant Findings. Principal Problem:    Hyperglycemia  Active Problems:    Acute on chronic combined systolic and diastolic heart failure (HCC)    NSTEMI (non-ST elevated myocardial infarction) ([address])  Resolved Problems:    * No resolved hospital problems. *       Problems and results from this hospitalization that need follow up. Follow up with HF team in 1 week  See PCP for BG control,  check BG values qid , may need endocrinology,  C peptide and GADA pending at d/c   Poor dentition:  see Dentist ASAP  Creat 1.4 on day of d/c. ..  will start oral lasix 20 mg on Monday 10/2/23     Significant test results and incidental findings. AIC 13.6  Weight 83 lbs on day of d/c home     Invasive procedures and treatments. ECHO 9/27/23   Summary   This is a STAT echocardiogram per Dr. Luna Swanson for heart failure, elevated   troponin, abnormal EKG, and possible MI. Dr. Luna Swanson bedside during study. Definity was used to assist with endocardial border delineation. Normal left ventricular wall thickness. Mildly dilated left ventricular   cavity size. Severely diminished left ventricular systolic function with an   estimated ejection fraction of 25%. Severe global hypokinesis with regional   variations.  No supplement to treat low calcium levels or osteopetrosis  Side effects: constipation, up set stomach, increased thirst, weakness and headache     clopidogrel 75 MG tablet  Commonly known as: PLAVIX  Take 1 tablet by mouth daily  Notes to patient: Use: is a blood thinner used to prevent stroke, heart attacks and other heart problems. Is used after stent placement to prevent stent closer.   Side effects: Headaches, nausea, dizziness nose bleeds or other signs of increase bleeding and bruising      Do not stop taking with out talking to your heart doctor     FreeStyle [place] [address] [place]  4-6 times a day  Notes to patient: Used to help control blood sugars     FreeStyle Pat 2 Sensor Misc  4-6 times a day  Notes to patient: Used to help control blood sugars     levothyroxine 50 MCG tablet  Commonly known as: SYNTHROID  Take 1 tablet by mouth daily  Notes to patient: Use: is a hormone used to treat hypothyroidism or enlarged thyroid gland and thyroid cancer  Side effects: increased appetite, weight loss, heat sensitivity, excessive sweating, headaches, hyperactivity, nervousness, anxiety, irritability, mood swings, tremors, muscle weakness, loose stools and stomach cramps     rosuvastatin 20 MG tablet  Commonly known as: CRESTOR  Take 1 tablet by mouth nightly  Notes to patient: Use: to lower your cholesterol   Side effects: Muscle weakness, fatigue      therapeutic multivitamin-minerals tablet  Notes to patient: Use: dietary supplement  Side effects: upset stomach, nausea            STOP taking these medications      carvedilol 3.125 MG tablet  Commonly known as: COREG     sulfamethoxazole-trimethoprim 800-160 MG per tablet  Commonly known as: BACTRIM DS;SEPTRA DS               Where to Get Your Medications        These medications were sent to [address] [facility] [id number] [facility], [place] - [address] [facility] [phone] Ariel Oneill [phone]  [address], [address]      Phone: [phone]

## 2023-09-30 NOTE — CARE COORDINATION
Case Management -  Discharge Note      Patient Name: Corey Smith                   YOB: 1945            Readmission Risk (Low < 19, Mod (19-27), High > 27): Readmission Risk Score: 12.2    Current PCP: Griselda Quinonez MD      PT AM-PAC: 20 /24  OT AM-PAC: 24 /24    Patient/patient representative has been educated on the benefits of [address] as well as the possible risks of declining recommended services. Patient/patient representative has acknowledged the information provided and decided on the following discharge plan. Patient/ patient representative has been provided freedom of choice regarding service provider, supported by basic dialogue that supports the patient's individualized plan of care/goals. Home Care Information:   Is patient resuming current home health care services: No    [address]:   [facility]  [address] [facility]  Phone: [phone]  Fax: [phone]             Services: Skilled Nursing, [address] Order Obtained: Yes    Home health agency notified of discharge. Grace Temple in intake who stated will start care 10/01/2023    Financial    Payor: Kiko Raoy / Plan: Adam Cevallos / Product Type: *No Product type* /     Pharmacy:  Potential assistance Purchasing Medications: No  Meds-to-Beds request: Yes      [facility] [id number] [facility], [address]  Phone: [phone] Fax: [phone] [address] [facility], [address] [place] [address] [phone] Tung Yung [phone]  [address] [place] [address]  Phone: [phone] Fax: [phone]      Notes: Additional Case Management Notes:   Family will Transport patient home.      Electronically signed by Carlito Gomez RN /BSN/CCM on 9/30/2023 at 2:16 PM

## 2023-09-30 NOTE — PROGRESS NOTES
[facility]  HEART FAILURE  Progress Note      Admit Date 9/26/2023     Reason for Consult:      Reason for Consultation/Chief Complaint: SOB    HPI:    Dannie Jane is a 70 y.o. female with PMH CAD, ICM, HFrEF, DM admitted with SOB, drop in EF and had LHC yesterday with PCI to LAD. Yesterday SHARMIN with mild MR      Subjective:  Patient is being seen for CHF. There were no acute overnight cardiac events. Today Ms. Vega Armen c/o weakness but denies chest pain, shortness of breath, palpitations, or dizziness      Review of Systems - General ROS: positive for  - weakness  Respiratory ROS: no cough, shortness of breath, or wheezing  Cardiovascular ROS: no chest pain or dyspnea on exertion  Gastrointestinal ROS: no abdominal pain, change in bowel habits, or black or bloody stools  Musculoskeletal ROS: negative  Neurological ROS: no TIA or stroke symptoms     Baseline Weight: uk, has lost wt   Wt Readings from Last 3 Encounters:   09/30/23 83 lb 3.2 oz (37.7 kg)   09/25/23 86 lb (39 kg)   05/30/23 91 lb (41.3 kg)         Cardiac Testing: SHARMIN 9/29/23:   Findings: Very dilated LV with severely depressed LVEF. No vegetations. At most mild MR. Echo 9/27/2023   Summary   This is a STAT echocardiogram per Dr. Hilaria Elkins for heart failure, elevated   troponin, abnormal EKG, and possible MI. Dr. Manrique Kidney bedside during study. Definity was used to assist with endocardial border delineation. Normal left ventricular wall thickness. Mildly dilated left ventricular   cavity size. Severely diminished left ventricular systolic function with an   estimated ejection fraction of 25%. Severe global hypokinesis with regional   variations. No evidence of apical thrombus with Definity. Grade III diastolic dysfunction with elevated LV filling pressures. The right ventricle is normal in size with borderline systolic function.    Small mobile structure of unknown etiology noted on the mitral valve, cannot   exclude torn chordae tendineae, 09/29/2023 05:18 AM    K 5.0 09/29/2023 05:18 AM     09/29/2023 05:18 AM    CO2 21 09/29/2023 05:18 AM    BUN 36 09/29/2023 05:18 AM    CREATININE 1.0 09/29/2023 05:18 AM    GLUCOSE 220 09/29/2023 05:18 AM     INR:   Lab Results   Component Value Date/Time    INR 0.98 09/26/2023 11:07 PM        CARDIAC LABS  ENZYMES:No results for input(s): \"CKMB\", \"CKMBINDEX\", \"TROPONINI\" in the last 72 hours. Invalid input(s): \"CKTOTAL;3\"  FASTING LIPID PANEL:  Lab Results   Component Value Date/Time    HDL 55 09/26/2023 09:45 AM    LDLCALC 87 09/26/2023 09:45 AM    TRIG 80 09/26/2023 09:45 AM    TSH 2.93 08/11/2022 05:04 AM     LIVER PROFILE:  Lab Results   Component Value Date/Time    AST 15 09/26/2023 11:07 PM    AST 17 09/26/2023 09:45 AM    ALT 14 09/26/2023 11:07 PM    ALT 16 09/26/2023 09:45 AM     BNP:   Lab Results   Component Value Date/Time    PROBNP 2,969 09/29/2023 05:18 AM    PROBNP 8,294 09/26/2023 11:07 PM    PROBNP 11,600 09/26/2023 09:45 AM     Iron Studies:    Lab Results   Component Value Date/Time    TIBC 242 09/27/2023 05:51 AM    TIBC 316 08/11/2022 05:04 AM    FERRITIN 118.6 09/27/2023 05:51 AM    FERRITIN 127.9 08/12/2022 04:16 AM     Lab Results   Component Value Date    IRON 38 09/27/2023    TIBC 242 (L) 09/27/2023    FERRITIN 118.6 09/27/2023      Iron Deficiency Anemia:  Yes IV Iron Therapy:  Yes  2017 ACC/AHA HF Guidelines:   intravenous iron replacement in patients with New York Heart Association (NYHA) class II and III HF and iron deficiency(ferritin <100 ng/ml or 100-300 ng/ml if transferrin saturation <20%), to improve functional status and QoL. 1. WEIGHT: Admit Weight - Scale: 85 lb (38.6 kg)      Today  Weight - Scale: 83 lb 3.2 oz (37.7 kg)   2.  I/O   Intake/Output Summary (Last 24 hours) at 9/30/2023 0933  Last data filed at 9/30/2023 0240  Gross per 24 hour   Intake 1226.11 ml   Output 2200 ml   Net -973.89 ml         Assessment/Plan:     Acute Heart Failure:  ~ compensated  ~ BNP 11K >8294<2969, 1300 out with IV lasix  ~Plan: stop IV lasix, one more dose IV iron today, pt ok for d/c today with po lasix and restart jardiance, she has f/u in CHF clinic 10/6  Cardiomyopathy: ischemic, new drop in EF  ~Echo:  EF: 25%   Core measures for HF:  ACEi/ARB/ARNI: entresto    BB: Metoprolol succinate   Feliz: Spironolactone   SGLT2i: Jardiance, pt has not been taking  ~Device: none  ~Plan: s/p PCI, optimize meds, repeat echo in 90 days  Hypotension:   ~continued, will d/c with lower doses BB and entresto  CAD:   ~Status: s/p pci, no CP   ~Meds:   Statin crestor  BB: Metoprolol succinate  ASA, Plavix       All questions and concerns were addressed to the patient. Alternatives to my treatment were discussed. I have discussed the above stated plan with patient and the nurse. The patient verbalized understanding and agreed with the plan.     I appreciate the opportunity of cooperating in the care of this individual.    Arabella Martin, APRN - CNP, ACNP, AGPCNP 9/30/2023, 9:33 AM  Heart Failure  The [address]  [address] [facility] [address], [address], [address]  Ph: [phone]

## 2023-09-30 NOTE — PROGRESS NOTES
Pt BP 82/47, after administration of 5 mg Percocet, see MAR. NP On-Call notified. Pt asymptomatic, sleeping in bed, denies lightheadedness. Bed alarm active, pt agrees to call to ambulate.

## 2023-09-30 NOTE — PROGRESS NOTES
Data- discharge order received, pt verbalized agreement to discharge, needs for [address] with [facility] however Son and pt voiced preference for [facility] and may opt to call them on Monday instead for nurse services PT/OT, DONTE reviewed and signed by MD, to be completed by RN. Action- AVS prepared, discharge instructions prepared and given to [facility] with son and  at bedside, medication information packet given r/t NEW or CHANGED prescriptions, pt verbalized understanding further self-review. Special attention provided over Humalog dosing coordination with blood glucose levels (when to hold verses taking sliding scale when elevated) as well as diuretics in relation to weights/UOP. Education provided on beta blocker, Jardiance, Entresto and Asprin/Plavix post stent placement-handouts as well. Review extensively over HF Zones, BG emergencies, Low sodium diet, hydration verses fluid overload and BB education with parameters on when to hold. Hand outs also provided. D/C instruction summary: Diet- CC, Low sodium diet reviewed in detail in compare to Melva cuisine-reduction of carbs, ROQUE, adding mrs dash and checking sodium on food labels. Activity- as tolerated with post cath lift restriction, pt and family educated repeatedly about energy conservation as well as scheduling periods of rest. follow up with Primary Care Physician Toney Yeboah [phone] appointment to be made but pt has follow up with cardiology 10/06/2023 and endocrinology new appointment in Dec., medications prescriptions to be filled with paper scripts. Inpatient surgical procedural reviewed: post cath wound care and restrictions reviewed. Contact information provided to above agencies used. Response- Case Management/ reported faxing completed DONTE and AVS to needed HHC/DME services stated above. Pt belongings gathered, IV removed, pt dressed with assist from staff.  Disposition is home with [facility] as stated above, transported to [facility] via w/c with belongings, no complications. 1. WEIGHT: Admit Weight - Scale: 85 lb (38.6 kg) (09/26/23 2156)        Today  Weight - Scale: 83 lb 3.2 oz (37.7 kg) (09/30/23 0426)       2.  O2 SAT.: SpO2: 100 % (09/30/23 7459)

## 2023-10-01 VITALS
DIASTOLIC BLOOD PRESSURE: 68 MMHG | OXYGEN SATURATION: 100 % | BODY MASS INDEX: 18.72 KG/M2 | HEART RATE: 78 BPM | TEMPERATURE: 97.7 F | HEIGHT: 56 IN | RESPIRATION RATE: 16 BRPM | SYSTOLIC BLOOD PRESSURE: 106 MMHG | WEIGHT: 83.2 LBS

## 2023-10-02 ENCOUNTER — TELEPHONE (OUTPATIENT)
Dept: OTHER | Age: 78
End: 2023-10-02

## 2023-10-02 ENCOUNTER — CARE COORDINATION (OUTPATIENT)
Dept: CASE MANAGEMENT | Age: 78
End: 2023-10-02

## 2023-10-02 NOTE — TELEPHONE ENCOUNTER
[address] [facility] [address] FAILURE PROGRAM  TELEPHONE ENCOUNTER FORM    Teddy MCKEON [address] [facility] 1945    Attempted to call patient for HF follow-up. No answer at this time.       Next MD/ Clinic appointment: 10/6      JESSICA BENJAMIN RN 10/2/2023 11:01 AM

## 2023-10-03 ENCOUNTER — CARE COORDINATION (OUTPATIENT)
Dept: CASE MANAGEMENT | Age: 78
End: 2023-10-03

## 2023-10-03 ENCOUNTER — TELEPHONE (OUTPATIENT)
Dept: OTHER | Age: 78
End: 2023-10-03

## 2023-10-03 NOTE — CARE COORDINATION
Care Transitions Initial Follow Up Call    Call within 2 business days of discharge: Yes    Second & final attempt at 24 hour discharge call, no answer, CTN left VM with contact information and request for return call. CTN will resolve episode & remain available.     Follow Up  Future Appointments   Date Time Provider [address]   10/6/2023  1:15 PM THANG Trinidad - CNS FF Cardio [facility]   12/7/2023  1:00 PM Ankita Gutiérrez MD FF ENDO [facility]       Marcelo Cardona RN

## 2023-10-04 LAB — C PEPTIDE SERPL-MCNC: 0.5 NG/ML (ref 1.1–4.4)

## 2023-10-06 ENCOUNTER — OFFICE VISIT (OUTPATIENT)
Dept: CARDIOLOGY CLINIC | Age: 78
End: 2023-10-06

## 2023-10-06 ENCOUNTER — HOSPITAL ENCOUNTER (OUTPATIENT)
Age: 78
Discharge: HOME | End: 2023-10-06
Payer: MEDICARE

## 2023-10-06 VITALS
HEART RATE: 90 BPM | BODY MASS INDEX: 19.17 KG/M2 | DIASTOLIC BLOOD PRESSURE: 56 MMHG | OXYGEN SATURATION: 97 % | HEIGHT: 56 IN | SYSTOLIC BLOOD PRESSURE: 84 MMHG | WEIGHT: 85.2 LBS

## 2023-10-06 DIAGNOSIS — I50.22 CHRONIC SYSTOLIC HEART FAILURE (HCC): ICD-10-CM

## 2023-10-06 DIAGNOSIS — E10.59 TYPE 1 DIABETES MELLITUS WITH OTHER CIRCULATORY COMPLICATION (HCC): ICD-10-CM

## 2023-10-06 DIAGNOSIS — I25.10 CORONARY ARTERY DISEASE DUE TO CALCIFIED CORONARY LESION: ICD-10-CM

## 2023-10-06 DIAGNOSIS — I25.84 CORONARY ARTERY DISEASE DUE TO CALCIFIED CORONARY LESION: ICD-10-CM

## 2023-10-06 DIAGNOSIS — I50.22 CHRONIC SYSTOLIC HEART FAILURE (HCC): Primary | ICD-10-CM

## 2023-10-06 LAB — GAD65 AB SER IA-ACNC: <5 IU/ML (ref 0–5)

## 2023-10-06 PROCEDURE — 80048 BASIC METABOLIC PNL TOTAL CA: CPT

## 2023-10-06 PROCEDURE — 36415 COLL VENOUS BLD VENIPUNCTURE: CPT

## 2023-10-06 PROCEDURE — 83880 ASSAY OF NATRIURETIC PEPTIDE: CPT

## 2023-10-06 NOTE — PROGRESS NOTES
Physician Progress Note      PATIENT:               Kassidy Duenas  CSN #:                  [id number]  :                       1945  ADMIT DATE:       2023 9:49 PM  [address] [facility] DATE:        2023 7:39 PM  RESPONDING  PROVIDER #:        Barrington Espino CNP          QUERY TEXT:    Patient admitted with SOB. Documentation reflects presumed NSTEMI in    Cardio consult. If possible, please document in the progress notes and   discharge summary if NSTEMI was: The medical record reflects the following:  Risk Factors: CAD, CHF, elevated troponins  Clinical Indicators: Troponins- 18, 16  Per  Cardio consult- Troponin elevation, mild, but given acute severe   decline in LVEF, need to treat as presumed NSTEMI until proven otherwise  -T wave inversions are concerning for a new ACS event  LHC- w/ severe single vessel CAD  Treatment: Labs, Cardio consult, Echo, LHC with PCI, ASA, BB, ACE/ARB, Plavix  Options provided:  -- NSTEMI confirmed after study  -- NSTEMI ruled out after study  -- Other - I will add my own diagnosis  -- Disagree - Not applicable / Not valid  -- Disagree - Clinically unable to determine / Unknown  -- Refer to Clinical Documentation Reviewer    PROVIDER RESPONSE TEXT:    NSTEMI confirmed after study.     Query created by: Patricia Garcia on 10/4/2023 11:11 AM      Electronically signed by:  Barrington Espino CNP 10/6/2023 8:18 AM

## 2023-10-06 NOTE — PROGRESS NOTES
Excellent Result. Contrast: 20  [place]ouro Time: 3.7  Access: R radial a     Impression  ~Coronary Angiography w/ severe single vessel CAD  ~Successful complex angioplasty and stenting of LAD    Recommendation  ~Aggressive medical treatment and risk factor modification  ~1. Post cath IVF. Bedrest.   2. Recommend beta blocker, ace/arb, high potency statin, aspirin and plavix for 6-12 months. 3. Referral to outpatient cardiac rehab phase II will be deferred until patient follow-up in office and then determine patient safety and appropriateness to proceed  4. Patient has been advised on the importance of regular exercise of at least 20-30 minutes daily. 5. Patient counseled about and offered assistance for smoking cessation   6. Follow up in 2 weeks with cardiology    Echo 8/11/2022  Summary   -Overall left ventricular systolic function appears severely reduced.   -Ejection fraction is visually estimated to be 15-20%. -There is severe global hypokinesis with varying regional wall motion   abnormalities. -Grade II diastolic dysfunction with elevated LV filling pressures. e/e' =   16.8.   -Aortic valve appears sclerotic but opens adequately.   -Thickened mitral valve leaflets. -Mild mitral regurgitation.   -Trivial tricuspid regurgitation.   -Trivial pericardial effusion. Assessment:    1. Chronic systolic heart failure (HCC) on arni, bb and sglt2; no aldosterone antagonist due to hyperkalemia   2. Coronary artery disease due to calcified coronary lesion    3.  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

## 2023-10-06 NOTE — PATIENT INSTRUCTIONS
Blood work today  Make sure you see Dr Karla Sanchez in December appointment as your diabetes needs to be improved  Cut entresto to once a day   Continue all other medications  RTO in 3 weeks  Someone needs to help you more at home

## 2023-10-07 LAB
ANION GAP SERPL CALCULATED.3IONS-SCNC: 18 MMOL/L (ref 3–16)
BUN SERPL-MCNC: 57 MG/DL (ref 7–20)
CALCIUM SERPL-MCNC: 10 MG/DL (ref 8.3–10.6)
CHLORIDE SERPL-SCNC: 90 MMOL/L (ref 99–110)
CO2 SERPL-SCNC: 25 MMOL/L (ref 21–32)
CREAT SERPL-MCNC: 1.4 MG/DL (ref 0.6–1.2)
GFR SERPLBLD CREATININE-BSD FMLA CKD-EPI: 39 ML/MIN/{1.73_M2}
GLUCOSE SERPL-MCNC: 347 MG/DL (ref 70–99)
NT-PROBNP SERPL-MCNC: 1672 PG/ML (ref 0–449)
POTASSIUM SERPL-SCNC: 4.9 MMOL/L (ref 3.5–5.1)
SODIUM SERPL-SCNC: 133 MMOL/L (ref 136–145)

## 2023-10-09 ENCOUNTER — TELEPHONE (OUTPATIENT)
Dept: CARDIOLOGY CLINIC | Age: 78
End: 2023-10-09

## 2023-10-09 NOTE — TELEPHONE ENCOUNTER
----- Message from THANG Talavera sent at 10/7/2023  1:30 PM EDT -----  Blood work improved  Blood sugar needs better controlled  Continue all current medications  Thanks    Spoke to patient's spouse he verbalized understanding.

## 2023-10-27 ENCOUNTER — HOSPITAL ENCOUNTER (OUTPATIENT)
Age: 78
Discharge: HOME | End: 2023-10-27
Payer: MEDICARE

## 2023-10-27 ENCOUNTER — OFFICE VISIT (OUTPATIENT)
Dept: CARDIOLOGY CLINIC | Age: 78
End: 2023-10-27
Payer: MEDICARE

## 2023-10-27 VITALS
HEIGHT: 56 IN | WEIGHT: 84 LBS | DIASTOLIC BLOOD PRESSURE: 60 MMHG | HEART RATE: 69 BPM | SYSTOLIC BLOOD PRESSURE: 118 MMHG | OXYGEN SATURATION: 100 % | BODY MASS INDEX: 18.9 KG/M2

## 2023-10-27 DIAGNOSIS — I25.10 CORONARY ARTERY DISEASE DUE TO CALCIFIED CORONARY LESION: ICD-10-CM

## 2023-10-27 DIAGNOSIS — E10.59 TYPE 1 DIABETES MELLITUS WITH OTHER CIRCULATORY COMPLICATION (HCC): ICD-10-CM

## 2023-10-27 DIAGNOSIS — I50.22 CHRONIC SYSTOLIC HEART FAILURE (HCC): ICD-10-CM

## 2023-10-27 DIAGNOSIS — E55.9 VITAMIN D DEFICIENCY: ICD-10-CM

## 2023-10-27 DIAGNOSIS — I25.84 CORONARY ARTERY DISEASE DUE TO CALCIFIED CORONARY LESION: ICD-10-CM

## 2023-10-27 DIAGNOSIS — I50.22 CHRONIC SYSTOLIC HEART FAILURE (HCC): Primary | ICD-10-CM

## 2023-10-27 LAB
25(OH)D3 SERPL-MCNC: 44.2 NG/ML
ANION GAP SERPL CALCULATED.3IONS-SCNC: 10 MMOL/L (ref 3–16)
BUN SERPL-MCNC: 37 MG/DL (ref 7–20)
CALCIUM SERPL-MCNC: 9.5 MG/DL (ref 8.3–10.6)
CHLORIDE SERPL-SCNC: 104 MMOL/L (ref 99–110)
CO2 SERPL-SCNC: 25 MMOL/L (ref 21–32)
CREAT SERPL-MCNC: 1.1 MG/DL (ref 0.6–1.2)
GFR SERPLBLD CREATININE-BSD FMLA CKD-EPI: 52 ML/MIN/{1.73_M2}
GLUCOSE SERPL-MCNC: 141 MG/DL (ref 70–99)
NT-PROBNP SERPL-MCNC: 1438 PG/ML (ref 0–449)
POTASSIUM SERPL-SCNC: 5.1 MMOL/L (ref 3.5–5.1)
SODIUM SERPL-SCNC: 139 MMOL/L (ref 136–145)

## 2023-10-27 PROCEDURE — 80048 BASIC METABOLIC PNL TOTAL CA: CPT

## 2023-10-27 PROCEDURE — 83880 ASSAY OF NATRIURETIC PEPTIDE: CPT

## 2023-10-27 PROCEDURE — 3046F HEMOGLOBIN A1C LEVEL >9.0%: CPT | Performed by: CLINICAL NURSE SPECIALIST

## 2023-10-27 PROCEDURE — 1123F ACP DISCUSS/DSCN MKR DOCD: CPT | Performed by: CLINICAL NURSE SPECIALIST

## 2023-10-27 PROCEDURE — 99214 OFFICE O/P EST MOD 30 MIN: CPT | Performed by: CLINICAL NURSE SPECIALIST

## 2023-10-27 PROCEDURE — 36415 COLL VENOUS BLD VENIPUNCTURE: CPT

## 2023-10-27 PROCEDURE — 82306 VITAMIN D 25 HYDROXY: CPT

## 2023-10-27 NOTE — PATIENT INSTRUCTIONS
Continue all current medications  Check blood work today  Will do cardiac rehab once home care is finished  Schedule an echo in dec/jan  RTO in dec

## 2023-10-30 ENCOUNTER — TELEPHONE (OUTPATIENT)
Dept: CARDIOLOGY CLINIC | Age: 78
End: 2023-10-30

## 2023-10-30 PROBLEM — E46 PROTEIN CALORIE MALNUTRITION (HCC): Status: ACTIVE | Noted: 2023-10-30

## 2023-10-30 PROBLEM — N18.30 CHRONIC RENAL DISEASE, STAGE III (HCC): Status: ACTIVE | Noted: 2023-10-30

## 2023-10-30 NOTE — TELEPHONE ENCOUNTER
----- Message from THANG Almanza sent at 10/30/2023  8:27 AM EDT -----  Blood work is improving  Vitamin d is normal  Continue all current medications  thanks

## 2023-10-31 DIAGNOSIS — I50.9 CONGESTIVE HEART FAILURE, UNSPECIFIED HF CHRONICITY, UNSPECIFIED HEART FAILURE TYPE (HCC): ICD-10-CM

## 2023-10-31 DIAGNOSIS — E11.65 TYPE 2 DIABETES MELLITUS WITH HYPERGLYCEMIA, UNSPECIFIED WHETHER LONG TERM INSULIN USE (HCC): ICD-10-CM

## 2023-10-31 LAB
ALBUMIN SERPL-MCNC: 4.4 G/DL (ref 3.4–5)
ALBUMIN/GLOB SERPL: 1.6 {RATIO} (ref 1.1–2.2)
ALP SERPL-CCNC: 97 U/L (ref 40–129)
ALT SERPL-CCNC: 20 U/L (ref 10–40)
ANION GAP SERPL CALCULATED.3IONS-SCNC: 12 MMOL/L (ref 3–16)
AST SERPL-CCNC: 26 U/L (ref 15–37)
BASOPHILS # BLD: 0 K/UL (ref 0–0.2)
BASOPHILS NFR BLD: 0.7 %
BILIRUB SERPL-MCNC: 0.3 MG/DL (ref 0–1)
BUN SERPL-MCNC: 41 MG/DL (ref 7–20)
CALCIUM SERPL-MCNC: 9.9 MG/DL (ref 8.3–10.6)
CHLORIDE SERPL-SCNC: 102 MMOL/L (ref 99–110)
CHOLEST SERPL-MCNC: 139 MG/DL (ref 0–199)
CO2 SERPL-SCNC: 27 MMOL/L (ref 21–32)
CREAT SERPL-MCNC: 1.3 MG/DL (ref 0.6–1.2)
DEPRECATED RDW RBC AUTO: 13 % (ref 12.4–15.4)
EOSINOPHIL # BLD: 0.3 K/UL (ref 0–0.6)
EOSINOPHIL NFR BLD: 4.1 %
FOLATE SERPL-MCNC: >20 NG/ML (ref 4.78–24.2)
GFR SERPLBLD CREATININE-BSD FMLA CKD-EPI: 42 ML/MIN/{1.73_M2}
GLUCOSE SERPL-MCNC: 123 MG/DL (ref 70–99)
HCT VFR BLD AUTO: 34.6 % (ref 36–48)
HDLC SERPL-MCNC: 63 MG/DL (ref 40–60)
HGB BLD-MCNC: 11.8 G/DL (ref 12–16)
LDLC SERPL CALC-MCNC: 66 MG/DL
LYMPHOCYTES # BLD: 1.4 K/UL (ref 1–5.1)
LYMPHOCYTES NFR BLD: 21.1 %
MCH RBC QN AUTO: 31.2 PG (ref 26–34)
MCHC RBC AUTO-ENTMCNC: 34.2 G/DL (ref 31–36)
MCV RBC AUTO: 91.3 FL (ref 80–100)
MONOCYTES # BLD: 0.6 K/UL (ref 0–1.3)
MONOCYTES NFR BLD: 8.4 %
NEUTROPHILS # BLD: 4.5 K/UL (ref 1.7–7.7)
NEUTROPHILS NFR BLD: 65.7 %
NT-PROBNP SERPL-MCNC: 1633 PG/ML (ref 0–449)
PLATELET # BLD AUTO: 260 K/UL (ref 135–450)
PMV BLD AUTO: 9.1 FL (ref 5–10.5)
POTASSIUM SERPL-SCNC: 4.5 MMOL/L (ref 3.5–5.1)
PROT SERPL-MCNC: 7.2 G/DL (ref 6.4–8.2)
RBC # BLD AUTO: 3.79 M/UL (ref 4–5.2)
SODIUM SERPL-SCNC: 141 MMOL/L (ref 136–145)
TRIGL SERPL-MCNC: 49 MG/DL (ref 0–150)
TSH SERPL DL<=0.005 MIU/L-ACNC: 2.56 UIU/ML (ref 0.27–4.2)
VIT B12 SERPL-MCNC: 1214 PG/ML (ref 211–911)
VLDLC SERPL CALC-MCNC: 10 MG/DL
WBC # BLD AUTO: 6.8 K/UL (ref 4–11)

## 2023-10-31 NOTE — TELEPHONE ENCOUNTER
Tried to reach patient [facility] for her to return our call about her lab results. Spoke to patient she verbalized understanding.

## 2023-11-01 LAB
EST. AVERAGE GLUCOSE BLD GHB EST-MCNC: 240.3 MG/DL
HBA1C MFR BLD: 10 %

## 2023-11-07 ENCOUNTER — HOSPITAL ENCOUNTER (OUTPATIENT)
Age: 78
Discharge: HOME | End: 2023-11-07
Payer: MEDICARE

## 2023-11-07 DIAGNOSIS — E11.65 TYPE 2 DIABETES MELLITUS WITH HYPERGLYCEMIA, UNSPECIFIED WHETHER LONG TERM INSULIN USE (HCC): ICD-10-CM

## 2023-11-07 LAB
ALBUMIN SERPL-MCNC: 4.1 G/DL (ref 3.4–5)
ALBUMIN/GLOB SERPL: 1.4 {RATIO} (ref 1.1–2.2)
ALP SERPL-CCNC: 81 U/L (ref 40–129)
ALT SERPL-CCNC: 21 U/L (ref 10–40)
ANION GAP SERPL CALCULATED.3IONS-SCNC: 10 MMOL/L (ref 3–16)
AST SERPL-CCNC: 32 U/L (ref 15–37)
BILIRUB SERPL-MCNC: 0.3 MG/DL (ref 0–1)
BUN SERPL-MCNC: 28 MG/DL (ref 7–20)
CALCIUM SERPL-MCNC: 9.4 MG/DL (ref 8.3–10.6)
CHLORIDE SERPL-SCNC: 105 MMOL/L (ref 99–110)
CO2 SERPL-SCNC: 25 MMOL/L (ref 21–32)
CREAT SERPL-MCNC: 1.1 MG/DL (ref 0.6–1.2)
GFR SERPLBLD CREATININE-BSD FMLA CKD-EPI: 52 ML/MIN/{1.73_M2}
GLUCOSE SERPL-MCNC: 199 MG/DL (ref 70–99)
POTASSIUM SERPL-SCNC: 4.9 MMOL/L (ref 3.5–5.1)
PROT SERPL-MCNC: 7 G/DL (ref 6.4–8.2)
SODIUM SERPL-SCNC: 140 MMOL/L (ref 136–145)

## 2023-11-07 PROCEDURE — 36415 COLL VENOUS BLD VENIPUNCTURE: CPT

## 2023-11-07 PROCEDURE — 80053 COMPREHEN METABOLIC PANEL: CPT

## 2023-11-21 ENCOUNTER — OFFICE VISIT (OUTPATIENT)
Dept: ENDOCRINOLOGY | Age: 78
End: 2023-11-21

## 2023-11-21 ENCOUNTER — HOSPITAL ENCOUNTER (OUTPATIENT)
Age: 78
Discharge: HOME | End: 2023-11-21
Payer: MEDICARE

## 2023-11-21 VITALS
DIASTOLIC BLOOD PRESSURE: 67 MMHG | HEIGHT: 56 IN | HEART RATE: 59 BPM | RESPIRATION RATE: 14 BRPM | TEMPERATURE: 98 F | SYSTOLIC BLOOD PRESSURE: 141 MMHG | BODY MASS INDEX: 19.35 KG/M2 | WEIGHT: 86 LBS

## 2023-11-21 DIAGNOSIS — I50.9 CONGESTIVE HEART FAILURE, UNSPECIFIED HF CHRONICITY, UNSPECIFIED HEART FAILURE TYPE (HCC): ICD-10-CM

## 2023-11-21 DIAGNOSIS — I50.43 ACUTE ON CHRONIC COMBINED SYSTOLIC AND DIASTOLIC HEART FAILURE (HCC): ICD-10-CM

## 2023-11-21 DIAGNOSIS — E11.65 POORLY CONTROLLED TYPE 2 DIABETES MELLITUS WITH COMPLICATION (HCC): Primary | ICD-10-CM

## 2023-11-21 DIAGNOSIS — E11.8 POORLY CONTROLLED TYPE 2 DIABETES MELLITUS WITH COMPLICATION (HCC): ICD-10-CM

## 2023-11-21 DIAGNOSIS — E03.9 HYPOTHYROIDISM, UNSPECIFIED TYPE: ICD-10-CM

## 2023-11-21 DIAGNOSIS — E11.8 POORLY CONTROLLED TYPE 2 DIABETES MELLITUS WITH COMPLICATION (HCC): Primary | ICD-10-CM

## 2023-11-21 DIAGNOSIS — E11.65 POORLY CONTROLLED TYPE 2 DIABETES MELLITUS WITH COMPLICATION (HCC): ICD-10-CM

## 2023-11-21 LAB
ALBUMIN SERPL-MCNC: 4.5 G/DL (ref 3.4–5)
ALBUMIN/GLOB SERPL: 1.6 {RATIO} (ref 1.1–2.2)
ALP SERPL-CCNC: 85 U/L (ref 40–129)
ALT SERPL-CCNC: 17 U/L (ref 10–40)
ANION GAP SERPL CALCULATED.3IONS-SCNC: 11 MMOL/L (ref 3–16)
ANTI-THYROGLOB ABS: 12 IU/ML
AST SERPL-CCNC: 25 U/L (ref 15–37)
BILIRUB SERPL-MCNC: 0.4 MG/DL (ref 0–1)
BUN SERPL-MCNC: 25 MG/DL (ref 7–20)
CALCIUM SERPL-MCNC: 10.4 MG/DL (ref 8.3–10.6)
CHLORIDE SERPL-SCNC: 103 MMOL/L (ref 99–110)
CHOLEST SERPL-MCNC: 138 MG/DL (ref 0–199)
CO2 SERPL-SCNC: 25 MMOL/L (ref 21–32)
CREAT SERPL-MCNC: 1 MG/DL (ref 0.6–1.2)
CREAT UR-MCNC: 25.2 MG/DL (ref 28–259)
GFR SERPLBLD CREATININE-BSD FMLA CKD-EPI: 58 ML/MIN/{1.73_M2}
GLUCOSE SERPL-MCNC: 282 MG/DL (ref 70–99)
HDLC SERPL-MCNC: 68 MG/DL (ref 40–60)
LDLC SERPL CALC-MCNC: 57 MG/DL
MICROALBUMIN UR DL<=1MG/L-MCNC: 14.4 MG/DL
MICROALBUMIN/CREAT UR: 571.4 MG/G (ref 0–30)
POTASSIUM SERPL-SCNC: 5 MMOL/L (ref 3.5–5.1)
PROT SERPL-MCNC: 7.3 G/DL (ref 6.4–8.2)
SODIUM SERPL-SCNC: 139 MMOL/L (ref 136–145)
THYROPEROXIDASE AB SERPL IA-ACNC: <5 IU/ML
TRIGL SERPL-MCNC: 67 MG/DL (ref 0–150)
TSH SERPL DL<=0.005 MIU/L-ACNC: 2.26 UIU/ML (ref 0.27–4.2)
VLDLC SERPL CALC-MCNC: 13 MG/DL

## 2023-11-21 PROCEDURE — 80053 COMPREHEN METABOLIC PANEL: CPT

## 2023-11-21 PROCEDURE — 84681 ASSAY OF C-PEPTIDE: CPT

## 2023-11-21 PROCEDURE — 86376 MICROSOMAL ANTIBODY EACH: CPT

## 2023-11-21 PROCEDURE — 36415 COLL VENOUS BLD VENIPUNCTURE: CPT

## 2023-11-21 PROCEDURE — 82043 UR ALBUMIN QUANTITATIVE: CPT

## 2023-11-21 PROCEDURE — 82570 ASSAY OF URINE CREATININE: CPT

## 2023-11-21 PROCEDURE — 82985 ASSAY OF GLYCATED PROTEIN: CPT

## 2023-11-21 PROCEDURE — 84443 ASSAY THYROID STIM HORMONE: CPT

## 2023-11-21 PROCEDURE — 86800 THYROGLOBULIN ANTIBODY: CPT

## 2023-11-21 PROCEDURE — 80061 LIPID PANEL: CPT

## 2023-11-21 RX ORDER — BLOOD-GLUCOSE SENSOR
EACH MISCELLANEOUS
Qty: 6 EACH | Refills: 1 | Status: SHIPPED | OUTPATIENT
Start: 2023-11-21

## 2023-11-21 NOTE — PROGRESS NOTES
Laura Schroeder is a 70 y.o. female is referred to me by Dr Beatrice Liao  for evaluation and management of  uncontrolled Type 2 diabetes. Hao Roberts was diagnosed with Diabetes mellitus in 1994. Historically patient had poorly controlled diabetes with A1c above 8 most of the time. Diabetes was diagnosed at routine screening. Teddy Roberts got diabetic education in the past.  Comorbid conditions: Neuropathy and Coronary Artery Disease  Patient was initially started on metformin, she was on Jardiance which she stopped for a few months due to cost issue but ended up having worsening coronary artery disease and needed PCI in September 2023. Patient started insulin 2 years ago and has been on basal bolus insulin regimen. Patient also has comorbidities that include Cad s/p PCI in 2022 and 2023 , HTN , Hyperlipidemia and Osteoporosis and is on fosamax started in sept 2023    She has Hypothyroidism for years and has been on meds some hx of noncompliance as her son told her she should do natural supplements which led to a TSH above 5 when she stopped taking her thyroid medication but now she takes her thyroid medication last evaluation in September 2023 was normal TSH. INTERIM:    Diabetes  She presents for her initial diabetic visit. She has type 2 diabetes mellitus. No MedicAlert identification noted. The initial diagnosis of diabetes was made 30 years ago. Her disease course has been worsening. Hypoglycemia symptoms include dizziness and sweats. Associated symptoms include foot paresthesias. There are no hypoglycemic complications. Symptoms are stable. Diabetic complications include heart disease and peripheral neuropathy. Pertinent negatives for diabetic complications include no retinopathy. Risk factors for coronary artery disease include diabetes mellitus, dyslipidemia, hypertension and post-menopausal. Current diabetic treatment includes insulin injections. She is following a generally healthy diet.  Meal

## 2023-11-21 NOTE — PATIENT INSTRUCTIONS
GOALS - 1. HBA1C (3MONTH AVG) SHOULD BE LESS THAN 6.5     PLEASE CHECK YOUR SUGARS:   BEFORE BREAKFAST  BEFORE LUNCH  BEFORE DINNER  BEDTIME  AFTER MEALS    2. FINGERSTICKS SHOULD BE   BEFORE MEALS <   AFTER MEALS < 140   BEDTIME >100     3. CARBOHYDRATES  MEALS 40--60 G  SNACKS 15 - 20 G    4.  BLOOD PRESSURE  < 130/80    --- Start lantus 10   units daily     ---Start taking short acting insulin (humalog/novolog) according to carbohydrate to insulin ratio 15 gm of carb = 1 unit of short acting insulin,                                        +    Sliding Scale Insulin for short acting insulin   If BS > 120 -150 take 2 additional units of fast actinginsulin   if --180 take 4 units   181-210 take 6 Units  211-240 take 8 Units   241--270 take 10 Units   271- 300 take 12 Units

## 2023-11-23 LAB — FRUCTOSAMINE SERPL-SCNC: 449 UMOL/L (ref 205–285)

## 2023-11-26 LAB — C PEPTIDE SERPL-MCNC: 2.5 NG/ML (ref 1.1–4.4)

## 2023-11-30 ENCOUNTER — OFFICE VISIT (OUTPATIENT)
Dept: CARDIOLOGY CLINIC | Age: 78
End: 2023-11-30
Payer: MEDICARE

## 2023-11-30 ENCOUNTER — HOSPITAL ENCOUNTER (OUTPATIENT)
Dept: NON INVASIVE DIAGNOSTICS | Age: 78
Discharge: HOME | End: 2023-11-30
Payer: MEDICARE

## 2023-11-30 ENCOUNTER — TELEPHONE (OUTPATIENT)
Dept: ENDOCRINOLOGY | Age: 78
End: 2023-11-30

## 2023-11-30 VITALS
WEIGHT: 87 LBS | OXYGEN SATURATION: 99 % | BODY MASS INDEX: 19.57 KG/M2 | HEART RATE: 61 BPM | SYSTOLIC BLOOD PRESSURE: 120 MMHG | DIASTOLIC BLOOD PRESSURE: 60 MMHG | HEIGHT: 56 IN

## 2023-11-30 DIAGNOSIS — I25.10 CORONARY ARTERY DISEASE DUE TO CALCIFIED CORONARY LESION: ICD-10-CM

## 2023-11-30 DIAGNOSIS — E11.8 POORLY CONTROLLED TYPE 2 DIABETES MELLITUS WITH COMPLICATION (HCC): Primary | ICD-10-CM

## 2023-11-30 DIAGNOSIS — E55.9 VITAMIN D DEFICIENCY: ICD-10-CM

## 2023-11-30 DIAGNOSIS — I50.22 CHRONIC SYSTOLIC HEART FAILURE (HCC): ICD-10-CM

## 2023-11-30 DIAGNOSIS — E11.65 POORLY CONTROLLED TYPE 2 DIABETES MELLITUS WITH COMPLICATION (HCC): Primary | ICD-10-CM

## 2023-11-30 DIAGNOSIS — I50.22 CHRONIC SYSTOLIC HEART FAILURE (HCC): Primary | ICD-10-CM

## 2023-11-30 DIAGNOSIS — E10.59 TYPE 1 DIABETES MELLITUS WITH OTHER CIRCULATORY COMPLICATION (HCC): ICD-10-CM

## 2023-11-30 DIAGNOSIS — I25.84 CORONARY ARTERY DISEASE DUE TO CALCIFIED CORONARY LESION: ICD-10-CM

## 2023-11-30 LAB
LEFT VENTRICULAR EJECTION FRACTION HIGH VALUE: 55 %
LEFT VENTRICULAR EJECTION FRACTION MODE: NORMAL

## 2023-11-30 PROCEDURE — 99214 OFFICE O/P EST MOD 30 MIN: CPT | Performed by: CLINICAL NURSE SPECIALIST

## 2023-11-30 PROCEDURE — 93321 DOPPLER ECHO F-UP/LMTD STD: CPT

## 2023-11-30 PROCEDURE — 93325 DOPPLER ECHO COLOR FLOW MAPG: CPT

## 2023-11-30 PROCEDURE — 3046F HEMOGLOBIN A1C LEVEL >9.0%: CPT | Performed by: CLINICAL NURSE SPECIALIST

## 2023-11-30 PROCEDURE — 93308 TTE F-UP OR LMTD: CPT

## 2023-11-30 PROCEDURE — 1123F ACP DISCUSS/DSCN MKR DOCD: CPT | Performed by: CLINICAL NURSE SPECIALIST

## 2023-11-30 NOTE — TELEPHONE ENCOUNTER
Patient asking about lab results from 11/21. Also stopped by office for [facility], would like to know how her blood sugars are doing. Report attached- colored copy given to MD.    Please call [phone] to reach patient.

## 2023-11-30 NOTE — PROGRESS NOTES
[facility]  Progress Note    Primary Care Doctor:  Bill Hermosillo MD    Chief Complaint   Patient presents with    Follow-up    Congestive Heart Failure        History of Present Illness:  70 y.o. female with history of diabetes with U2I of 59.4, systolic heart failure   8/69-27/1457 for shortness of breath, acute systolic heart failure with elevated LFTs, bilateral pleural effusions, mildly anemia, [facility] with PCI to LAD.   9/26-30/2023 for shortness of breath, acute on chronic s/diastolic heart failure, nstemi. A1c 13.6, bnp 11K to 2969. She received some IV venofer. I had the pleasure of seeing Adrianna Davenport in follow up for systolic heart failure. She is ambulatory and with her . She had an echo done today which preliminary shows LVEF of 50-55%. She is taking all her medications. She is getting her diabetes better controlled and saw Dr Candy Mcguire. She denies any chest pain, palpitations, lightheadedness, shortness of breath or edema. Past Medical History:   has a past medical history of Diabetes mellitus ([address]) and Hypothyroid. Surgical History:   has no past surgical history on file. Social History:   reports that she has never smoked. She has never used smokeless tobacco. She reports that she does not drink alcohol and does not use drugs. Family History:   No family history on file. Home Medications:  Prior to Admission medications    Medication Sig Start Date End Date Taking?  Authorizing Provider   Continuous Blood Gluc Sensor (FREESTYLE RUBY 3 SENSOR) [facility] To check glucose ---please change every 14 days 11/21/23  Yes Melisa Roberto MD   sacubitril-valsartan (ENTRESTO) 24-26 MG per tablet Take 0.5 tablets by mouth daily 10/6/23  Yes THANG Duran - CNS   metoprolol succinate (TOPROL XL) 25 MG extended release tablet Take 0.5 tablets by mouth daily 10/1/23  Yes Ibeth Locke APRN - CNP   insulin glargine (LANTUS SOLOSTAR) 100 UNIT/ML injection pen

## 2023-11-30 NOTE — TELEPHONE ENCOUNTER
Also lab work showed that she does have kidney damage from diabetes, I would recommend that she resume medications like Jardiance or Cloria Vasques whichever 1 her insurance covers and is affordable to her.   If she starts the oral hypoglycemic agent like Jardiance or Cloria Vasques she might be able to stop the long-acting insulin

## 2023-11-30 NOTE — TELEPHONE ENCOUNTER
Please advise patient I reviewed her continuous glucose sensor data and she is having some low glucose readings, every time she has a low glucose reading on the continuous glucose senso I would like her to stick her finger and make sure that it is actually a low glucose value.   She should reduce her long-acting insulin from 10 units to 8 units of Lantus daily  Please make sure that she takes her meal boluses 10 minutes prior to eating according to the sliding scale which was provided to the patient

## 2023-12-04 NOTE — TELEPHONE ENCOUNTER
LMOM for patient to call for information below. For MD... Per [employer] is formulary.  If patient it agreeable are we sending 10 mg or 25 mg?

## 2023-12-04 NOTE — TELEPHONE ENCOUNTER
Spoke to patient, she is currently taking 10 mg Jardiance and is agreeable to increasing to 25 mg daily if that is what Dr Elaine Brenner recommends. Please advise and I will send to [facility].

## 2023-12-04 NOTE — TELEPHONE ENCOUNTER
When I saw patient in the office she told me that she was not able to afford the Jardiance and I was not aware that she is on 10 mg of Jardiance if she is currently taking 10 mg of Jardiance and we can increase the dose to 25 mg and send a new prescription for her to the pharmacy.

## 2023-12-26 ENCOUNTER — TELEPHONE (OUTPATIENT)
Dept: ENDOCRINOLOGY | Age: 78
End: 2023-12-26

## 2023-12-26 NOTE — TELEPHONE ENCOUNTER
LVM to return call    Anna Espinoza 3 readers are not available through pharmacy. Must get these through DME ([address] [place] [address] [address] Pat 2 reader is not compatible w/ pat 3 sensors. If pt needs a reader, she must contact her insurance and find out which DME works with her insurance and have them fax the form to [facility]. Please be advised MD is currently out of the office.  We can electronically sign to [address] if it is compatible w/ pt's insurance and this would be the quickest.

## 2023-12-26 NOTE — TELEPHONE ENCOUNTER
Pt spouse asking for a kim 3 reader pt has the sensor but not the reader and pt spouse says she is using [place] [employer] [place] [employer] asking if they can both be used together

## 2024-01-03 DIAGNOSIS — E11.65 POORLY CONTROLLED TYPE 2 DIABETES MELLITUS WITH COMPLICATION (HCC): ICD-10-CM

## 2024-01-03 DIAGNOSIS — E11.8 POORLY CONTROLLED TYPE 2 DIABETES MELLITUS WITH COMPLICATION (HCC): ICD-10-CM

## 2024-01-03 RX ORDER — BLOOD-GLUCOSE SENSOR
EACH MISCELLANEOUS
Qty: 6 EACH | Refills: 1 | Status: SHIPPED | OUTPATIENT
Start: 2024-01-03

## 2024-01-19 ENCOUNTER — OFFICE VISIT (OUTPATIENT)
Dept: ENDOCRINOLOGY | Age: 79
End: 2024-01-19

## 2024-01-19 DIAGNOSIS — E11.8 POORLY CONTROLLED TYPE 2 DIABETES MELLITUS WITH COMPLICATION (HCC): Primary | ICD-10-CM

## 2024-01-19 DIAGNOSIS — E11.65 POORLY CONTROLLED TYPE 2 DIABETES MELLITUS WITH COMPLICATION (HCC): Primary | ICD-10-CM

## 2024-01-19 NOTE — PROGRESS NOTES
Medical Nutrition Therapy for Diabetes  [facility] Endocrinology    Hao Gooden  January 19, 2024    Patient Care Team:  Kenzie Platt MD as PCP - General (Internal Medicine)  Kenzie Platt MD as PCP - Empaneled Provider    Reason for visit: 1. Poorly controlled type 2 diabetes mellitus with complication (HCC)     Initial     ASSESSMENT/PLAN:   NUTRITION DIAGNOSIS    #1 Problem: Altered Nutrition-Related Laboratory Values (NC-2.2)  Related to: Endocrine/Diabetes   As Evidenced by: Elevated Plasma glucose and/or HgbA1c levels         #2 Problem: Inconsistent Carbohydrate Intake (NI 5.8.4)  Related to: Varied meal timing / incorrect carbohydrate counting  As Evidenced by: fluctuation in blood glucose levels / food recall    #3 Problem: Knowledge and Beliefs-NB-3.1                       Food and nutrition deficits    NUTRITION INTERVENTION  Nutrition Prescription: 30-45 grams carbohydrate per meal with protein and non-starch vegetables  15 gram carbohydrate snacks, 3 meals per day, insulin before meals    Diabetes Education/Counseling included:  Carbohydrate control- I;C ratio and SSI reviewed with pt and son present for visit this date. Pt provided list of CHO items in 15 gm portions.   Activity/Exercise- take 30 minute walk after lunch to help with afternoon highs.  Label reading- total carb, protein, DV% reviewed.     Monitoring- CGM kim report reviewed- TIR 76% with low 2%, dropping to 60's in earlier moring to mid morning, continues to take Lantus 8u in morning to help prevent hypoglycemia, recommended 20% reduction in long acting insulin rt continuing to have hypoglycemic events- suggested reduction to 6u for safety and to call MD to notify of continued hypoglycemic events.     Medication Review  Reviewed proper medication timing  Reviewed insulin function- insulin being injected after meals and skipped often at breakfast, encouraged to take insulin 15 minutes prior to eating.   Explained small

## 2024-02-02 ENCOUNTER — OFFICE VISIT (OUTPATIENT)
Dept: ENDOCRINOLOGY | Age: 79
End: 2024-02-02

## 2024-02-02 DIAGNOSIS — E11.65 POORLY CONTROLLED TYPE 2 DIABETES MELLITUS WITH COMPLICATION (HCC): Primary | ICD-10-CM

## 2024-02-02 DIAGNOSIS — E11.8 POORLY CONTROLLED TYPE 2 DIABETES MELLITUS WITH COMPLICATION (HCC): Primary | ICD-10-CM

## 2024-02-02 NOTE — PROGRESS NOTES
kit 0    blood glucose monitor strips 1 strip by Other route 4 times daily (before meals and nightly) ICD code- E11.65, Z79.4 100 strip 3    Lancets MISC 1 each by Does not apply route 4 times daily (before meals and nightly) 100 each 3    clopidogrel (PLAVIX) 75 MG tablet Take 1 tablet by mouth daily 90 tablet 3    rosuvastatin (CRESTOR) 20 MG tablet Take 1 tablet by mouth nightly 90 tablet 3    Caltrate 600+D Plus Minerals (CALTRATE) 600-800 MG-UNIT TABS tablet Take 1 tablet by mouth 2 times daily 180 tablet 2    alendronate (FOSAMAX) 70 MG tablet Take 1 tablet by mouth every 7 days 12 tablet 5    levothyroxine (SYNTHROID) 50 MCG tablet Take 1 tablet by mouth daily 100 tablet 2    Continuous Blood Gluc  (FREESTYLE RUBY 2 READER) LYDIA 4-6 times a day 12 each 2    Continuous Blood Gluc Sensor (FREESTYLE RUBY 2 SENSOR) MISC 4-6 times a day 12 each 3    aspirin EC 81 MG EC tablet Take 1 tablet by mouth daily 90 tablet 3    therapeutic multivitamin-minerals (THERAGRAN-M) tablet Take 1 tablet by mouth daily       No current facility-administered medications for this visit.       NUTRITION ASSESSMENT    Biochemical Data:  Reference range:  Normal <5.7%  Prediabetes: 5.7 - 6.4%  Diabetes: >6.4%  Glycemic control for adults with diabetes: <7.0 %     Lab Results   Component Value Date    LABA1C 10.0 10/31/2023     Lab Results   Component Value Date    .3 10/31/2023       Lab Results   Component Value Date    CHOL 138 11/21/2023    CHOL 139 10/31/2023    CHOL 158 09/26/2023     Lab Results   Component Value Date    TRIG 67 11/21/2023    TRIG 49 10/31/2023    TRIG 80 09/26/2023     Lab Results   Component Value Date    HDL 68 (H) 11/21/2023    HDL 63 (H) 10/31/2023    HDL 55 09/26/2023     Lab Results   Component Value Date    LDLCALC 57 11/21/2023    LDLCALC 66 10/31/2023    LDLCALC 87 09/26/2023     No results found for: \"VLDL\"    No results found for: \"CHOLHDLRATIO\"    Lab Results   Component Value Date

## 2024-03-28 ENCOUNTER — TELEPHONE (OUTPATIENT)
Dept: ENDOCRINOLOGY | Age: 79
End: 2024-03-28

## 2024-03-28 DIAGNOSIS — E11.8 POORLY CONTROLLED TYPE 2 DIABETES MELLITUS WITH COMPLICATION (HCC): ICD-10-CM

## 2024-03-28 DIAGNOSIS — E11.65 POORLY CONTROLLED TYPE 2 DIABETES MELLITUS WITH COMPLICATION (HCC): ICD-10-CM

## 2024-03-28 NOTE — TELEPHONE ENCOUNTER
Fax from [facility] w/ new rx request for Jardiance Tab    LOV   11-21-23  FOV   4-17-24

## 2024-03-29 PROBLEM — E10.59 TYPE 1 DIABETES MELLITUS WITH OTHER CIRCULATORY COMPLICATION (HCC): Status: ACTIVE | Noted: 2024-03-29

## 2024-04-01 ENCOUNTER — OFFICE VISIT (OUTPATIENT)
Dept: CARDIOLOGY CLINIC | Age: 79
End: 2024-04-01
Payer: MEDICARE

## 2024-04-01 VITALS
HEIGHT: 56 IN | SYSTOLIC BLOOD PRESSURE: 120 MMHG | WEIGHT: 88 LBS | DIASTOLIC BLOOD PRESSURE: 56 MMHG | BODY MASS INDEX: 19.8 KG/M2 | OXYGEN SATURATION: 99 % | HEART RATE: 78 BPM

## 2024-04-01 DIAGNOSIS — E10.59 TYPE 1 DIABETES MELLITUS WITH OTHER CIRCULATORY COMPLICATION (HCC): ICD-10-CM

## 2024-04-01 DIAGNOSIS — I25.84 CORONARY ARTERY DISEASE DUE TO CALCIFIED CORONARY LESION: ICD-10-CM

## 2024-04-01 DIAGNOSIS — I25.10 CORONARY ARTERY DISEASE DUE TO CALCIFIED CORONARY LESION: ICD-10-CM

## 2024-04-01 DIAGNOSIS — I50.22 CHRONIC SYSTOLIC HEART FAILURE (HCC): Primary | ICD-10-CM

## 2024-04-01 PROBLEM — I50.43 ACUTE ON CHRONIC COMBINED SYSTOLIC AND DIASTOLIC HEART FAILURE (HCC): Status: RESOLVED | Noted: 2023-09-29 | Resolved: 2024-04-01

## 2024-04-01 PROCEDURE — 99214 OFFICE O/P EST MOD 30 MIN: CPT | Performed by: CLINICAL NURSE SPECIALIST

## 2024-04-01 PROCEDURE — 1123F ACP DISCUSS/DSCN MKR DOCD: CPT | Performed by: CLINICAL NURSE SPECIALIST

## 2024-04-01 RX ORDER — METOPROLOL SUCCINATE 25 MG/1
12.5 TABLET, EXTENDED RELEASE ORAL DAILY
Qty: 45 TABLET | Refills: 2 | Status: SHIPPED | OUTPATIENT
Start: 2024-04-01

## 2024-04-01 RX ORDER — FUROSEMIDE 20 MG/1
10 TABLET ORAL DAILY
Qty: 45 TABLET | Refills: 1 | Status: SHIPPED | OUTPATIENT
Start: 2024-04-01

## 2024-04-01 NOTE — PROGRESS NOTES
[facility] [place]  Progress Note    Primary Care Doctor:  Kenzie Platt MD    Chief Complaint   Patient presents with    Congestive Heart Failure        History of Present Illness:  79 y.o. female with history of diabetes with A1c of 10.1, systolic heart failure   8/10-14/2022 for shortness of breath, acute systolic heart failure with elevated LFTs, bilateral pleural effusions, mildly anemia, [facility] with PCI to LAD.   9/26-30/2023 for shortness of breath, acute on chronic s/diastolic heart failure, nstemi.  A1c 13.6, bnp 11K to 2969.  She received some IV venofer.    I had the pleasure of seeing Hao Gooden in follow up for systolic heart failure with LVEF 50-55%.  She is ambulatory and with her .  She is doing great, no chest pain, palpitations, lightheadedness, edema or shortness of breath.  Her blood sugars are much improved.  She is taking all her medications.  She has gained 2 pounds.  Her brother in [place] passed away and plans to go back in Sept    Past Medical History:   has a past medical history of Diabetes mellitus (HCC) and Hypothyroid.  Surgical History:   has no past surgical history on file.   Social History:   reports that she has never smoked. She has never used smokeless tobacco. She reports that she does not drink alcohol and does not use drugs.   Family History:   No family history on file.    Home Medications:  Prior to Admission medications    Medication Sig Start Date End Date Taking? Authorizing Provider   furosemide (LASIX) 20 MG tablet Take 0.5 tablets by mouth daily 4/1/24  Yes Guillermina Liang APRN - CNS   metoprolol succinate (TOPROL XL) 25 MG extended release tablet Take 0.5 tablets by mouth daily 4/1/24  Yes Guillermina Liang APRN - CNS   sacubitril-valsartan (ENTRESTO) 24-26 MG per tablet Take 0.5 tablets by mouth daily 4/1/24  Yes Guillermina Liang APRN - CNS   empagliflozin (JARDIANCE) 25 MG tablet One tablet daily 3/28/24  Yes Carlota Bain MD   insulin

## 2024-04-03 ENCOUNTER — HOSPITAL ENCOUNTER (OUTPATIENT)
Age: 79
Discharge: HOME | End: 2024-04-03
Payer: MEDICARE

## 2024-04-03 DIAGNOSIS — E11.65 TYPE 2 DIABETES MELLITUS WITH HYPERGLYCEMIA, UNSPECIFIED WHETHER LONG TERM INSULIN USE (HCC): ICD-10-CM

## 2024-04-03 LAB
ALBUMIN SERPL-MCNC: 4.2 G/DL (ref 3.4–5)
ALBUMIN/GLOB SERPL: 1.6 {RATIO} (ref 1.1–2.2)
ALP SERPL-CCNC: 89 U/L (ref 40–129)
ALT SERPL-CCNC: 17 U/L (ref 10–40)
ANION GAP SERPL CALCULATED.3IONS-SCNC: 10 MMOL/L (ref 3–16)
AST SERPL-CCNC: 23 U/L (ref 15–37)
BASOPHILS # BLD: 0.1 K/UL (ref 0–0.2)
BASOPHILS NFR BLD: 0.9 %
BILIRUB SERPL-MCNC: <0.2 MG/DL (ref 0–1)
BUN SERPL-MCNC: 35 MG/DL (ref 7–20)
CALCIUM SERPL-MCNC: 9.3 MG/DL (ref 8.3–10.6)
CHLORIDE SERPL-SCNC: 106 MMOL/L (ref 99–110)
CHOLEST SERPL-MCNC: 138 MG/DL (ref 0–199)
CO2 SERPL-SCNC: 24 MMOL/L (ref 21–32)
CREAT SERPL-MCNC: 1 MG/DL (ref 0.6–1.2)
DEPRECATED RDW RBC AUTO: 12.4 % (ref 12.4–15.4)
EOSINOPHIL # BLD: 0.2 K/UL (ref 0–0.6)
EOSINOPHIL NFR BLD: 3.7 %
ERYTHROCYTE [SEDIMENTATION RATE] IN BLOOD BY WESTERGREN METHOD: 12 MM/HR (ref 0–30)
FOLATE SERPL-MCNC: >20 NG/ML (ref 4.78–24.2)
GFR SERPLBLD CREATININE-BSD FMLA CKD-EPI: 58 ML/MIN/{1.73_M2}
GLUCOSE SERPL-MCNC: 116 MG/DL (ref 70–99)
HCT VFR BLD AUTO: 30.9 % (ref 36–48)
HDLC SERPL-MCNC: 64 MG/DL (ref 40–60)
HGB BLD-MCNC: 10.6 G/DL (ref 12–16)
LDLC SERPL CALC-MCNC: 59 MG/DL
LYMPHOCYTES # BLD: 1.3 K/UL (ref 1–5.1)
LYMPHOCYTES NFR BLD: 21.5 %
MCH RBC QN AUTO: 32.9 PG (ref 26–34)
MCHC RBC AUTO-ENTMCNC: 34.4 G/DL (ref 31–36)
MCV RBC AUTO: 95.4 FL (ref 80–100)
MONOCYTES # BLD: 0.5 K/UL (ref 0–1.3)
MONOCYTES NFR BLD: 8.1 %
NEUTROPHILS # BLD: 4.1 K/UL (ref 1.7–7.7)
NEUTROPHILS NFR BLD: 65.8 %
NT-PROBNP SERPL-MCNC: 1128 PG/ML (ref 0–449)
PLATELET # BLD AUTO: 224 K/UL (ref 135–450)
PMV BLD AUTO: 8.8 FL (ref 5–10.5)
POTASSIUM SERPL-SCNC: 4.6 MMOL/L (ref 3.5–5.1)
PROT SERPL-MCNC: 6.8 G/DL (ref 6.4–8.2)
RBC # BLD AUTO: 3.23 M/UL (ref 4–5.2)
SODIUM SERPL-SCNC: 140 MMOL/L (ref 136–145)
TRIGL SERPL-MCNC: 77 MG/DL (ref 0–150)
TSH SERPL DL<=0.005 MIU/L-ACNC: 3.88 UIU/ML (ref 0.27–4.2)
VIT B12 SERPL-MCNC: 935 PG/ML (ref 211–911)
VLDLC SERPL CALC-MCNC: 15 MG/DL
WBC # BLD AUTO: 6.2 K/UL (ref 4–11)

## 2024-04-03 PROCEDURE — 36415 COLL VENOUS BLD VENIPUNCTURE: CPT

## 2024-04-03 PROCEDURE — 85652 RBC SED RATE AUTOMATED: CPT

## 2024-04-03 PROCEDURE — 85025 COMPLETE CBC W/AUTO DIFF WBC: CPT

## 2024-04-03 PROCEDURE — 83036 HEMOGLOBIN GLYCOSYLATED A1C: CPT

## 2024-04-03 PROCEDURE — 80061 LIPID PANEL: CPT

## 2024-04-03 PROCEDURE — 84443 ASSAY THYROID STIM HORMONE: CPT

## 2024-04-03 PROCEDURE — 80053 COMPREHEN METABOLIC PANEL: CPT

## 2024-04-03 PROCEDURE — 82746 ASSAY OF FOLIC ACID SERUM: CPT

## 2024-04-03 PROCEDURE — 82607 VITAMIN B-12: CPT

## 2024-04-03 PROCEDURE — 83880 ASSAY OF NATRIURETIC PEPTIDE: CPT

## 2024-04-04 ENCOUNTER — TELEPHONE (OUTPATIENT)
Dept: CARDIOLOGY CLINIC | Age: 79
End: 2024-04-04

## 2024-04-04 DIAGNOSIS — D64.9 ANEMIA, UNSPECIFIED TYPE: Primary | ICD-10-CM

## 2024-04-04 DIAGNOSIS — D64.9 ANEMIA, UNSPECIFIED TYPE: ICD-10-CM

## 2024-04-04 LAB
EST. AVERAGE GLUCOSE BLD GHB EST-MCNC: 154.2 MG/DL
FERRITIN SERPL IA-MCNC: 244.1 NG/ML (ref 15–150)
HBA1C MFR BLD: 7 %
IRON SATN MFR SERPL: 32 % (ref 15–50)
IRON SERPL-MCNC: 87 UG/DL (ref 37–145)
TIBC SERPL-MCNC: 270 UG/DL (ref 260–445)

## 2024-04-04 NOTE — TELEPHONE ENCOUNTER
----- Message from THANG Castaneda - CNS sent at 4/4/2024  1:29 PM EDT -----  Blood work is great  Continue all current medications  She needs to talk with her PCP about anemia    Spoke to patient she verbalized understanding.

## 2024-04-04 NOTE — TELEPHONE ENCOUNTER
----- Message from Guillermina Liang, THANG - CNS sent at 4/4/2024  8:45 AM EDT -----  Please call and ask lab to add iron studies to blood in lab    Spoke to Lab they will add the new orders.

## 2024-04-17 ENCOUNTER — OFFICE VISIT (OUTPATIENT)
Dept: ENDOCRINOLOGY | Age: 79
End: 2024-04-17
Payer: MEDICARE

## 2024-04-17 VITALS
WEIGHT: 85.8 LBS | DIASTOLIC BLOOD PRESSURE: 60 MMHG | HEART RATE: 61 BPM | RESPIRATION RATE: 16 BRPM | SYSTOLIC BLOOD PRESSURE: 144 MMHG | HEIGHT: 56 IN | BODY MASS INDEX: 19.3 KG/M2

## 2024-04-17 DIAGNOSIS — I50.9 CONGESTIVE HEART FAILURE, UNSPECIFIED HF CHRONICITY, UNSPECIFIED HEART FAILURE TYPE (HCC): Primary | ICD-10-CM

## 2024-04-17 DIAGNOSIS — E11.8 POORLY CONTROLLED TYPE 2 DIABETES MELLITUS WITH COMPLICATION (HCC): ICD-10-CM

## 2024-04-17 DIAGNOSIS — E11.65 POORLY CONTROLLED TYPE 2 DIABETES MELLITUS WITH COMPLICATION (HCC): ICD-10-CM

## 2024-04-17 DIAGNOSIS — E03.9 HYPOTHYROIDISM, UNSPECIFIED TYPE: ICD-10-CM

## 2024-04-17 PROCEDURE — 99214 OFFICE O/P EST MOD 30 MIN: CPT | Performed by: INTERNAL MEDICINE

## 2024-04-17 PROCEDURE — 3051F HG A1C>EQUAL 7.0%<8.0%: CPT | Performed by: INTERNAL MEDICINE

## 2024-04-17 PROCEDURE — 1123F ACP DISCUSS/DSCN MKR DOCD: CPT | Performed by: INTERNAL MEDICINE

## 2024-04-17 PROCEDURE — 95251 CONT GLUC MNTR ANALYSIS I&R: CPT | Performed by: INTERNAL MEDICINE

## 2024-04-17 RX ORDER — INSULIN LISPRO 100 [IU]/ML
4 INJECTION, SOLUTION INTRAVENOUS; SUBCUTANEOUS
Qty: 5 ADJUSTABLE DOSE PRE-FILLED PEN SYRINGE | Refills: 2 | Status: SHIPPED | OUTPATIENT
Start: 2024-04-17

## 2024-04-17 RX ORDER — INSULIN GLARGINE 100 [IU]/ML
15 INJECTION, SOLUTION SUBCUTANEOUS NIGHTLY
Qty: 15 ADJUSTABLE DOSE PRE-FILLED PEN SYRINGE | Refills: 3 | Status: SHIPPED | OUTPATIENT
Start: 2024-04-17

## 2024-04-17 RX ORDER — BLOOD-GLUCOSE SENSOR
EACH MISCELLANEOUS
Qty: 6 EACH | Refills: 1 | Status: SHIPPED | OUTPATIENT
Start: 2024-04-17

## 2024-04-17 NOTE — PROGRESS NOTES
Acquired hypothyroidism thyroid antibodies negative in November 2023  She has been on thyroid medication for years  We will check thyroid antibodies and adjust the dose accordingly      3.  Faraz disease status post PCI in 2022 and 2023  Follows with cardiology status post PCI twice    4.  Acute on chronic combined systolic and diastolic heart failure (HCC)  Patient follows with cardiology      Reviewed and/or ordered clinical lab results yes   Reviewed and/or ordered radiology tests Yes  Reviewed and/or ordered other diagnostic tests yes   Made a decision to obtain old records yes   Reviewed and summarized old records yes     Hao Gooden was counseled regarding symptoms of current diagnosis, course and complications of disease if inadequately treated, side effects of medications, diagnosis, treatment options, and prognosis, risks, benefits, complications, and alternatives of treatment, labs, imaging and other studies and treatment targets and goals.  She understands instructions and counseling     Diabetes Continuous Glucose Monitoring Report         Reason for Study:     - improve diabetic control without risk of hypoglycemia       Current Medication regimen:   Lantus 10 units daily   Humalog 5 units with each meal      CGMS Report     CGMS data collection was performed on April 17, 2024   Patient provided information on her  diet, activities and insulin dosing  during this period.   Data was available for 14 days     Sensor Data Report:   - 12 AM to 6 AM: Overnight blood glucose pattern shows  - 6   AM to 10 AM:  Post breakfast  is noted  --10AM to 5 PM :  hypoglycemia observed during this time.  - 5   PM to 8 PM: Post meal  is noted       Average reading  148  mg/dL   Co eff balbina 26  % of time <70 mg/dL  1 %   % of time >180  mg/dL 24  %   % of time within range 75  %  Number of hypoglycemia episodes noted: 0     Impression:   CGMS shows stable glycemia overnight with mininal post prandial hyperglycemia

## 2024-07-23 ENCOUNTER — HOSPITAL ENCOUNTER (OUTPATIENT)
Age: 79
Discharge: HOME | End: 2024-07-23
Payer: MEDICARE

## 2024-07-23 ENCOUNTER — TELEPHONE (OUTPATIENT)
Dept: ENDOCRINOLOGY | Age: 79
End: 2024-07-23

## 2024-07-23 DIAGNOSIS — N18.30 STAGE 3 CHRONIC KIDNEY DISEASE, UNSPECIFIED WHETHER STAGE 3A OR 3B CKD (HCC): ICD-10-CM

## 2024-07-23 DIAGNOSIS — E11.65 TYPE 2 DIABETES MELLITUS WITH HYPERGLYCEMIA, UNSPECIFIED WHETHER LONG TERM INSULIN USE (HCC): ICD-10-CM

## 2024-07-23 DIAGNOSIS — I50.22 CHRONIC SYSTOLIC HEART FAILURE (HCC): ICD-10-CM

## 2024-07-23 LAB
ALBUMIN SERPL-MCNC: 4.1 G/DL (ref 3.4–5)
ALBUMIN/GLOB SERPL: 1.3 {RATIO} (ref 1.1–2.2)
ALP SERPL-CCNC: 90 U/L (ref 40–129)
ALT SERPL-CCNC: 20 U/L (ref 10–40)
ANION GAP SERPL CALCULATED.3IONS-SCNC: 10 MMOL/L (ref 3–16)
AST SERPL-CCNC: 24 U/L (ref 15–37)
BASOPHILS # BLD: 0 K/UL (ref 0–0.2)
BASOPHILS NFR BLD: 0.6 %
BILIRUB SERPL-MCNC: 0.3 MG/DL (ref 0–1)
BUN SERPL-MCNC: 26 MG/DL (ref 7–20)
CALCIUM SERPL-MCNC: 9.4 MG/DL (ref 8.3–10.6)
CHLORIDE SERPL-SCNC: 106 MMOL/L (ref 99–110)
CHOLEST SERPL-MCNC: 146 MG/DL (ref 0–199)
CO2 SERPL-SCNC: 22 MMOL/L (ref 21–32)
CREAT SERPL-MCNC: 0.9 MG/DL (ref 0.6–1.2)
CREAT UR-MCNC: 39 MG/DL (ref 28–259)
DEPRECATED RDW RBC AUTO: 12.5 % (ref 12.4–15.4)
EOSINOPHIL # BLD: 0.3 K/UL (ref 0–0.6)
EOSINOPHIL NFR BLD: 3.9 %
EST. AVERAGE GLUCOSE BLD GHB EST-MCNC: 159.9 MG/DL
GFR SERPLBLD CREATININE-BSD FMLA CKD-EPI: 66 ML/MIN/{1.73_M2}
GLUCOSE SERPL-MCNC: 104 MG/DL (ref 70–99)
HBA1C MFR BLD: 7.2 %
HCT VFR BLD AUTO: 33.1 % (ref 36–48)
HDLC SERPL-MCNC: 70 MG/DL (ref 40–60)
HGB BLD-MCNC: 11.1 G/DL (ref 12–16)
LDLC SERPL CALC-MCNC: 66 MG/DL
LYMPHOCYTES # BLD: 1.4 K/UL (ref 1–5.1)
LYMPHOCYTES NFR BLD: 17.7 %
MCH RBC QN AUTO: 32.3 PG (ref 26–34)
MCHC RBC AUTO-ENTMCNC: 33.6 G/DL (ref 31–36)
MCV RBC AUTO: 96.1 FL (ref 80–100)
MICROALBUMIN UR DL<=1MG/L-MCNC: 28.7 MG/DL
MICROALBUMIN/CREAT UR: 735.9 MG/G (ref 0–30)
MONOCYTES # BLD: 0.5 K/UL (ref 0–1.3)
MONOCYTES NFR BLD: 6.5 %
NEUTROPHILS # BLD: 5.7 K/UL (ref 1.7–7.7)
NEUTROPHILS NFR BLD: 71.3 %
NT-PROBNP SERPL-MCNC: 1563 PG/ML (ref 0–449)
PLATELET # BLD AUTO: 237 K/UL (ref 135–450)
PMV BLD AUTO: 9.2 FL (ref 5–10.5)
POTASSIUM SERPL-SCNC: 5 MMOL/L (ref 3.5–5.1)
PROT SERPL-MCNC: 7.2 G/DL (ref 6.4–8.2)
RBC # BLD AUTO: 3.44 M/UL (ref 4–5.2)
SODIUM SERPL-SCNC: 138 MMOL/L (ref 136–145)
T4 FREE SERPL-MCNC: 1 NG/DL (ref 0.9–1.8)
TRIGL SERPL-MCNC: 50 MG/DL (ref 0–150)
TSH SERPL DL<=0.005 MIU/L-ACNC: 5.05 UIU/ML (ref 0.27–4.2)
VLDLC SERPL CALC-MCNC: 10 MG/DL
WBC # BLD AUTO: 8 K/UL (ref 4–11)

## 2024-07-23 PROCEDURE — 36415 COLL VENOUS BLD VENIPUNCTURE: CPT

## 2024-07-23 PROCEDURE — 82570 ASSAY OF URINE CREATININE: CPT

## 2024-07-23 PROCEDURE — 83880 ASSAY OF NATRIURETIC PEPTIDE: CPT

## 2024-07-23 PROCEDURE — 80061 LIPID PANEL: CPT

## 2024-07-23 PROCEDURE — 80053 COMPREHEN METABOLIC PANEL: CPT

## 2024-07-23 PROCEDURE — 84439 ASSAY OF FREE THYROXINE: CPT

## 2024-07-23 PROCEDURE — 85025 COMPLETE CBC W/AUTO DIFF WBC: CPT

## 2024-07-23 PROCEDURE — 83036 HEMOGLOBIN GLYCOSYLATED A1C: CPT

## 2024-07-23 PROCEDURE — 84443 ASSAY THYROID STIM HORMONE: CPT

## 2024-07-23 PROCEDURE — 82043 UR ALBUMIN QUANTITATIVE: CPT

## 2024-07-23 NOTE — TELEPHONE ENCOUNTER
LMOM informing patient we have an opening on Friday in [facility] with Ban at 10 am. I have scheduled for the patient and instructed her to call to confirm that she can make this.

## 2024-07-25 ENCOUNTER — OFFICE VISIT (OUTPATIENT)
Dept: CARDIOLOGY CLINIC | Age: 79
End: 2024-07-25
Payer: MEDICARE

## 2024-07-25 ENCOUNTER — OFFICE VISIT (OUTPATIENT)
Dept: ENDOCRINOLOGY | Age: 79
End: 2024-07-25

## 2024-07-25 VITALS
DIASTOLIC BLOOD PRESSURE: 56 MMHG | RESPIRATION RATE: 16 BRPM | BODY MASS INDEX: 20.02 KG/M2 | SYSTOLIC BLOOD PRESSURE: 132 MMHG | WEIGHT: 89 LBS | HEART RATE: 65 BPM | HEIGHT: 56 IN

## 2024-07-25 VITALS
HEIGHT: 56 IN | BODY MASS INDEX: 20.24 KG/M2 | OXYGEN SATURATION: 99 % | SYSTOLIC BLOOD PRESSURE: 140 MMHG | HEART RATE: 57 BPM | WEIGHT: 90 LBS | DIASTOLIC BLOOD PRESSURE: 60 MMHG

## 2024-07-25 DIAGNOSIS — I50.22 CHRONIC SYSTOLIC HEART FAILURE (HCC): Primary | ICD-10-CM

## 2024-07-25 DIAGNOSIS — I50.9 CONGESTIVE HEART FAILURE, UNSPECIFIED HF CHRONICITY, UNSPECIFIED HEART FAILURE TYPE (HCC): ICD-10-CM

## 2024-07-25 DIAGNOSIS — E11.69 TYPE 2 DIABETES MELLITUS WITH OTHER SPECIFIED COMPLICATION, WITH LONG-TERM CURRENT USE OF INSULIN (HCC): Primary | ICD-10-CM

## 2024-07-25 DIAGNOSIS — E10.59 TYPE 1 DIABETES MELLITUS WITH OTHER CIRCULATORY COMPLICATION (HCC): ICD-10-CM

## 2024-07-25 DIAGNOSIS — E03.9 HYPOTHYROIDISM, UNSPECIFIED TYPE: ICD-10-CM

## 2024-07-25 DIAGNOSIS — I25.10 CORONARY ARTERY DISEASE DUE TO CALCIFIED CORONARY LESION: ICD-10-CM

## 2024-07-25 DIAGNOSIS — Z79.4 TYPE 2 DIABETES MELLITUS WITH OTHER SPECIFIED COMPLICATION, WITH LONG-TERM CURRENT USE OF INSULIN (HCC): Primary | ICD-10-CM

## 2024-07-25 DIAGNOSIS — I25.84 CORONARY ARTERY DISEASE DUE TO CALCIFIED CORONARY LESION: ICD-10-CM

## 2024-07-25 PROCEDURE — 99214 OFFICE O/P EST MOD 30 MIN: CPT | Performed by: CLINICAL NURSE SPECIALIST

## 2024-07-25 PROCEDURE — 1123F ACP DISCUSS/DSCN MKR DOCD: CPT | Performed by: CLINICAL NURSE SPECIALIST

## 2024-07-25 PROCEDURE — 3051F HG A1C>EQUAL 7.0%<8.0%: CPT | Performed by: CLINICAL NURSE SPECIALIST

## 2024-07-25 NOTE — PROGRESS NOTES
Hao Gooden is a 79 y.o. female is seen for  management of  uncontrolled Type 2 diabetes. Hao Gooden was diagnosed with Diabetes mellitus in 1994.  Historically patient had poorly controlled diabetes with A1c above 8 most of the time.  Diabetes was diagnosed at routine screening.   Hao Gooden got diabetic education in the past.  Comorbid conditions: Neuropathy and Coronary Artery Disease  Patient was initially started on metformin, she was on Jardiance which she stopped for a few months due to cost issue but ended up having worsening coronary artery disease and needed PCI in September 2023.  Patient started insulin 2 years ago and has been on basal bolus insulin regimen.    Patient also has comorbidities that include Cad s/p PCI in 2022 and 2023 , HTN , Hyperlipidemia and Osteoporosis and is on fosamax started in sept 2023    She has Hypothyroidism for years and has been on meds some hx of noncompliance as her son told her she should do natural supplements which led to a TSH above 5 when she stopped taking her thyroid medication but now she takes her thyroid medication last evaluation in September 2023 was normal TSH.    INTERIM:    Diabetes  She presents for her initial diabetic visit. She has type 2 diabetes mellitus. No MedicAlert identification noted. The initial diagnosis of diabetes was made 30 years ago. Her disease course has been worsening. Hypoglycemia symptoms include dizziness and sweats. Associated symptoms include foot paresthesias. There are no hypoglycemic complications. Symptoms are stable. Diabetic complications include heart disease and peripheral neuropathy. Pertinent negatives for diabetic complications include no retinopathy. Risk factors for coronary artery disease include diabetes mellitus, dyslipidemia, hypertension and post-menopausal. Current diabetic treatment includes insulin injections. She is following a generally healthy diet. Meal planning includes avoidance of

## 2024-07-25 NOTE — PATIENT INSTRUCTIONS
instructions adapted under license by [employer]. If you have questions about a medical condition or this instruction, always ask your healthcare professional. [employer] disclaims any warranty or liability for your use of this information.

## 2024-07-25 NOTE — PROGRESS NOTES
gait balance and coordination  No abnormalities of mood, affect, memory, mentation, or behavior are noted    Lab Data:    CBC:   Lab Results   Component Value Date/Time    WBC 8.0 07/23/2024 11:09 AM    WBC 6.2 04/03/2024 08:46 AM    WBC 6.8 10/31/2023 12:53 PM    RBC 3.44 07/23/2024 11:09 AM    RBC 3.23 04/03/2024 08:46 AM    RBC 3.79 10/31/2023 12:53 PM    HGB 11.1 07/23/2024 11:09 AM    HGB 10.6 04/03/2024 08:46 AM    HGB 11.8 10/31/2023 12:53 PM    HCT 33.1 07/23/2024 11:09 AM    HCT 30.9 04/03/2024 08:46 AM    HCT 34.6 10/31/2023 12:53 PM    MCV 96.1 07/23/2024 11:09 AM    MCV 95.4 04/03/2024 08:46 AM    MCV 91.3 10/31/2023 12:53 PM    RDW 12.5 07/23/2024 11:09 AM    RDW 12.4 04/03/2024 08:46 AM    RDW 13.0 10/31/2023 12:53 PM     07/23/2024 11:09 AM     04/03/2024 08:46 AM     10/31/2023 12:53 PM     BMP:  Lab Results   Component Value Date/Time     07/23/2024 11:09 AM     04/03/2024 08:46 AM     11/21/2023 10:48 AM    K 5.0 07/23/2024 11:09 AM    K 4.6 04/03/2024 08:46 AM    K 5.0 11/21/2023 10:48 AM    K 5.0 09/29/2023 05:18 AM    K 4.4 09/28/2023 05:34 AM    K 3.9 09/27/2023 05:51 AM     07/23/2024 11:09 AM     04/03/2024 08:46 AM     11/21/2023 10:48 AM    CO2 22 07/23/2024 11:09 AM    CO2 24 04/03/2024 08:46 AM    CO2 25 11/21/2023 10:48 AM    PHOS 6.6 09/30/2023 10:54 AM    BUN 26 07/23/2024 11:09 AM    BUN 35 04/03/2024 08:46 AM    BUN 25 11/21/2023 10:48 AM    CREATININE 0.9 07/23/2024 11:09 AM    CREATININE 1.0 04/03/2024 08:46 AM    CREATININE 1.0 11/21/2023 10:48 AM     BNP:   Lab Results   Component Value Date/Time    PROBNP 1,563 07/23/2024 11:09 AM    PROBNP 1,128 04/03/2024 08:46 AM    PROBNP 1,633 10/31/2023 12:53 PM     Cardiac Imaging:  Echo 11/30/2023  Summary   Normal left ventricular wall thickness.   Left ventricular cavity size is dilated.   Ejection fraction is visually estimated to be 50-55%.   Mild mitral regurgitation.   The

## 2024-07-25 NOTE — PATIENT INSTRUCTIONS
Continue all current medications  Be safe in [place]  RTO in 6 months  Keep all your medications with you and not in your checked bag

## 2024-07-26 ENCOUNTER — OFFICE VISIT (OUTPATIENT)
Dept: ENDOCRINOLOGY | Age: 79
End: 2024-07-26

## 2024-07-26 DIAGNOSIS — E11.69 TYPE 2 DIABETES MELLITUS WITH OTHER SPECIFIED COMPLICATION, WITH LONG-TERM CURRENT USE OF INSULIN (HCC): Primary | ICD-10-CM

## 2024-07-26 DIAGNOSIS — Z79.4 TYPE 2 DIABETES MELLITUS WITH OTHER SPECIFIED COMPLICATION, WITH LONG-TERM CURRENT USE OF INSULIN (HCC): Primary | ICD-10-CM

## 2024-07-26 NOTE — PROGRESS NOTES
food you bought not lasted till the end of the month and you didn't have money to get more? No  Beverage consumption: water, gavi without sugar, milk with chocolate powder   Alcohol consumption: none    Usual Food consumption:   2 meals per day, high carbs, guessing insulin dose        Sleep: fair, wakes often treat hypoglycemic events     Monitoring:   Has BG meter: Yes  Testing frequency: CGM kim 3 attached this date by nursing.   Recent results: 6 hypoglycemic events over past 2 week. Now using juice for lows per protocol.   Hypoglycemia? Yes  Average reading  148  mg/dL   Co eff balbina 26  % of time <70 mg/dL  1 %   % of time >180  mg/dL 37   %   % of time within range 62   %    Diabetes Medications: Yes  Knows name and dose of prescribed medications Yes  Knows prescribed schedule for medicationsYes  Recent change in medication type/dosage: No  Stores medications properlyYes  Comments:     Physical Activity History:   Physical activity: none, encouraged 30 minutes daily.       Barriers:   -none    Follow Up Plan: 3 months, contact information provided    Referring Provider: Carlota Bain MD      Time spent with patient: 30 minutes  10:00-10:30am    [address]

## 2024-11-29 ENCOUNTER — TELEPHONE (OUTPATIENT)
Dept: PHARMACY | Facility: CLINIC | Age: 79
End: 2024-11-29

## 2024-11-29 NOTE — TELEPHONE ENCOUNTER
[facility] CLINICAL PHARMACY: ADHERENCE REVIEW  Identified care gap per [employer]: fills at [facility]: Statin adherence      ASSESSMENT  STATIN ADHERENCE    Insurance Records claims through 2024 (Prior Year PDC = 62% - FAILED; YTD PDC = 81%; Potential Fail Date: 2024):   ROSUVASTATIN TAB 20MG  last filled on 2024 for 100 day supply. Next refill due: 10/26/2024    Prescribed si tablet/capsule daily    Per Insurer Portal: last filled on 2024 for 100 day supply.     Per [facility]: Tried to reach out to pharmacy to process refill - pharmacy closed today. Reached pharmacy on 2024 to refill ROSUVASTATIN TAB 20MG. Pharmacy sending out a 100 day supply     Lab Results   Component Value Date    CHOL 146 2024    TRIG 50 2024    HDL 70 (H) 2024     Lab Results   Component Value Date    LDL 66 2024      ALT   Date Value Ref Range Status   2024 20 10 - 40 U/L Final     AST   Date Value Ref Range Status   2024 24 15 - 37 U/L Final     The ASCVD Risk score (Gutierrez DK, et al., 2019) failed to calculate for the following reasons:    The patient has a prior MI or stroke diagnosis     PLAN  Per insurer report, LIS-0 - co-pays are based on tiers and patient is subject to coverage gap.      The following are interventions that have been identified:   Patient OVERDUE refilling ROSUVASTATIN TAB 20MG and active on home medication list.   Refill/s of ROSUVASTATIN TAB 20MG READY to  at patient's [facility] pharmacy    Attempting to reach patient to review - unable to leave message. Letter sent to patient.      Last Visit: 2024  Next Visit: None      Yashira House MA  [facility]   [facility]  [phone] Option 1     For Pharmacy Admin Tracking Only    Program: [facility] InsideMaps  CPA in place:  No  Recommendation Provided To: Pharmacy: 1  Intervention Detail: Adherence Monitoring:

## 2025-01-17 ENCOUNTER — OFFICE VISIT (OUTPATIENT)
Dept: CARDIOLOGY CLINIC | Age: 80
End: 2025-01-17
Payer: MEDICARE

## 2025-01-17 ENCOUNTER — HOSPITAL ENCOUNTER (OUTPATIENT)
Age: 80
Discharge: HOME | End: 2025-01-17
Payer: MEDICARE

## 2025-01-17 VITALS
WEIGHT: 86.4 LBS | DIASTOLIC BLOOD PRESSURE: 60 MMHG | OXYGEN SATURATION: 98 % | BODY MASS INDEX: 19.44 KG/M2 | HEART RATE: 61 BPM | HEIGHT: 56 IN | SYSTOLIC BLOOD PRESSURE: 120 MMHG

## 2025-01-17 DIAGNOSIS — I25.84 CORONARY ARTERY DISEASE DUE TO CALCIFIED CORONARY LESION: ICD-10-CM

## 2025-01-17 DIAGNOSIS — I50.22 CHRONIC SYSTOLIC HEART FAILURE (HCC): Primary | ICD-10-CM

## 2025-01-17 DIAGNOSIS — I50.22 CHRONIC SYSTOLIC HEART FAILURE (HCC): ICD-10-CM

## 2025-01-17 DIAGNOSIS — E11.65 TYPE 2 DIABETES MELLITUS WITH HYPERGLYCEMIA, UNSPECIFIED WHETHER LONG TERM INSULIN USE (HCC): ICD-10-CM

## 2025-01-17 DIAGNOSIS — I25.10 CORONARY ARTERY DISEASE DUE TO CALCIFIED CORONARY LESION: ICD-10-CM

## 2025-01-17 DIAGNOSIS — E10.59 TYPE 1 DIABETES MELLITUS WITH OTHER CIRCULATORY COMPLICATION (HCC): ICD-10-CM

## 2025-01-17 LAB
ALBUMIN SERPL-MCNC: 4.1 G/DL (ref 3.4–5)
ALBUMIN/GLOB SERPL: 1.4 {RATIO} (ref 1.1–2.2)
ALP SERPL-CCNC: 65 U/L (ref 40–129)
ALT SERPL-CCNC: 17 U/L (ref 10–40)
ANION GAP SERPL CALCULATED.3IONS-SCNC: 13 MMOL/L (ref 3–16)
AST SERPL-CCNC: 24 U/L (ref 15–37)
BACTERIA URNS QL MICRO: NORMAL /HPF
BASOPHILS # BLD: 0.1 K/UL (ref 0–0.2)
BASOPHILS NFR BLD: 1 %
BILIRUB SERPL-MCNC: <0.2 MG/DL (ref 0–1)
BILIRUB UR QL STRIP.AUTO: NEGATIVE
BUN SERPL-MCNC: 32 MG/DL (ref 7–20)
CALCIUM SERPL-MCNC: 9.5 MG/DL (ref 8.3–10.6)
CHLORIDE SERPL-SCNC: 106 MMOL/L (ref 99–110)
CHOLEST SERPL-MCNC: 125 MG/DL (ref 0–199)
CLARITY UR: CLEAR
CO2 SERPL-SCNC: 22 MMOL/L (ref 21–32)
COLOR UR: YELLOW
CREAT SERPL-MCNC: 1 MG/DL (ref 0.6–1.2)
DEPRECATED RDW RBC AUTO: 12.5 % (ref 12.4–15.4)
EOSINOPHIL # BLD: 0.3 K/UL (ref 0–0.6)
EOSINOPHIL NFR BLD: 4.4 %
EPI CELLS #/AREA URNS AUTO: 1 /HPF (ref 0–5)
ERYTHROCYTE [SEDIMENTATION RATE] IN BLOOD BY WESTERGREN METHOD: 15 MM/HR (ref 0–30)
EST. AVERAGE GLUCOSE BLD GHB EST-MCNC: 217.3 MG/DL
GFR SERPLBLD CREATININE-BSD FMLA CKD-EPI: 58 ML/MIN/{1.73_M2}
GLUCOSE SERPL-MCNC: 112 MG/DL (ref 70–99)
GLUCOSE UR STRIP.AUTO-MCNC: >=1000 MG/DL
HBA1C MFR BLD: 9.2 %
HCT VFR BLD AUTO: 35.6 % (ref 36–48)
HDLC SERPL-MCNC: 48 MG/DL (ref 40–60)
HGB BLD-MCNC: 12 G/DL (ref 12–16)
HGB UR QL STRIP.AUTO: NEGATIVE
HYALINE CASTS #/AREA URNS AUTO: 0 /LPF (ref 0–8)
KETONES UR STRIP.AUTO-MCNC: ABNORMAL MG/DL
LDLC SERPL CALC-MCNC: 64 MG/DL
LEUKOCYTE ESTERASE UR QL STRIP.AUTO: NEGATIVE
LYMPHOCYTES # BLD: 1.1 K/UL (ref 1–5.1)
LYMPHOCYTES NFR BLD: 18.8 %
MCH RBC QN AUTO: 32 PG (ref 26–34)
MCHC RBC AUTO-ENTMCNC: 33.6 G/DL (ref 31–36)
MCV RBC AUTO: 95.3 FL (ref 80–100)
MONOCYTES # BLD: 0.6 K/UL (ref 0–1.3)
MONOCYTES NFR BLD: 9.5 %
NEUTROPHILS # BLD: 4 K/UL (ref 1.7–7.7)
NEUTROPHILS NFR BLD: 66.3 %
NITRITE UR QL STRIP.AUTO: NEGATIVE
NT-PROBNP SERPL-MCNC: 589 PG/ML (ref 0–449)
PH UR STRIP.AUTO: 6.5 [PH] (ref 5–8)
PLATELET # BLD AUTO: 274 K/UL (ref 135–450)
PMV BLD AUTO: 8.5 FL (ref 5–10.5)
POTASSIUM SERPL-SCNC: 4.5 MMOL/L (ref 3.5–5.1)
PROT SERPL-MCNC: 7 G/DL (ref 6.4–8.2)
PROT UR STRIP.AUTO-MCNC: 30 MG/DL
RBC # BLD AUTO: 3.74 M/UL (ref 4–5.2)
RBC CLUMPS #/AREA URNS AUTO: 1 /HPF (ref 0–4)
SODIUM SERPL-SCNC: 141 MMOL/L (ref 136–145)
SP GR UR STRIP.AUTO: 1.02 (ref 1–1.03)
T3FREE SERPL-MCNC: 2.2 PG/ML (ref 2.3–4.2)
T4 FREE SERPL-MCNC: 1 NG/DL (ref 0.9–1.8)
TRIGL SERPL-MCNC: 63 MG/DL (ref 0–150)
TSH SERPL DL<=0.005 MIU/L-ACNC: 6.18 UIU/ML (ref 0.27–4.2)
UA COMPLETE W REFLEX CULTURE PNL UR: ABNORMAL
UA DIPSTICK W REFLEX MICRO PNL UR: YES
URN SPEC COLLECT METH UR: ABNORMAL
UROBILINOGEN UR STRIP-ACNC: 0.2 E.U./DL
VLDLC SERPL CALC-MCNC: 13 MG/DL
WBC # BLD AUTO: 6 K/UL (ref 4–11)
WBC #/AREA URNS AUTO: 2 /HPF (ref 0–5)

## 2025-01-17 PROCEDURE — 83880 ASSAY OF NATRIURETIC PEPTIDE: CPT

## 2025-01-17 PROCEDURE — 84439 ASSAY OF FREE THYROXINE: CPT

## 2025-01-17 PROCEDURE — 83036 HEMOGLOBIN GLYCOSYLATED A1C: CPT

## 2025-01-17 PROCEDURE — 85025 COMPLETE CBC W/AUTO DIFF WBC: CPT

## 2025-01-17 PROCEDURE — 99214 OFFICE O/P EST MOD 30 MIN: CPT | Performed by: CLINICAL NURSE SPECIALIST

## 2025-01-17 PROCEDURE — 80061 LIPID PANEL: CPT

## 2025-01-17 PROCEDURE — 1123F ACP DISCUSS/DSCN MKR DOCD: CPT | Performed by: CLINICAL NURSE SPECIALIST

## 2025-01-17 PROCEDURE — 36415 COLL VENOUS BLD VENIPUNCTURE: CPT

## 2025-01-17 PROCEDURE — 1160F RVW MEDS BY RX/DR IN RCRD: CPT | Performed by: CLINICAL NURSE SPECIALIST

## 2025-01-17 PROCEDURE — 85652 RBC SED RATE AUTOMATED: CPT

## 2025-01-17 PROCEDURE — 84443 ASSAY THYROID STIM HORMONE: CPT

## 2025-01-17 PROCEDURE — 81001 URINALYSIS AUTO W/SCOPE: CPT

## 2025-01-17 PROCEDURE — 80053 COMPREHEN METABOLIC PANEL: CPT

## 2025-01-17 PROCEDURE — 1159F MED LIST DOCD IN RCRD: CPT | Performed by: CLINICAL NURSE SPECIALIST

## 2025-01-17 PROCEDURE — 84481 FREE ASSAY (FT-3): CPT

## 2025-01-17 NOTE — PATIENT INSTRUCTIONS
Blood work today  Continue all current medications  RTO in 6 months  Needs an appt with Dr Rob for follow up

## 2025-01-27 NOTE — PROGRESS NOTES
[facility]     Outpatient Follow Up Note    Hao Gooden is 79 y.o. female who presents today with a history of coronary artery disease, systolic heart failure, diabetes and elevated LFT's     CHIEF COMPLAINT / HPI:  Follow Up secondary to coronary artery disease and status post coronary artery stenting  PCI to LAD.     History of Present Illness  The patient is a 79-year-old female who presents for evaluation of coronary artery disease, diabetes mellitus, and hyperlipidemia.      SOCIAL HISTORY  She does not smoke.    MEDICATIONS  Fosamax (alendronate) 70 mg once a day, aspirin 81 mg, Caltrate, Plavix, Jardiance, Lasix, insulin, metoprolol, Crestor, Entresto.      Subjective:   She has a known history of coronary artery disease, managed by Dr. Rob, and underwent stent placement following an episode of dyspnea, chest pain  and insomnia. Despite initial attempts to manage her symptoms with Vicks, she sought medical attention at an urgent care facility, where she was subsequently referred to the emergency department. She reports experiencing pain in her shoulder, hand, and leg, which she attributes to her cardiac condition. She also reports a decrease in her blood pressure and blood glucose levels, which she manages by consuming food and beverages before returning to sleep. She does not experience any current chest pain or shortness of breath. She is uncertain about her hypertensive status. She experiences palpitations when her blood glucose levels drop. She is on a low-sodium diet and attempts to limit her cholesterol and fat intake. She maintains an active lifestyle, including walking and household chores, and typically sleeps from 11:00 PM to 4:00 AM, with a nap during the day.    She is diabetic and her hemoglobin A1c is 9.0. She uses a FreeStyle Pat 3 glucose meter to monitor her blood glucose levels. She reports that her blood glucose levels were 167 at bedtime and dropped to 77 around 6:00 AM.

## 2025-01-28 ENCOUNTER — OFFICE VISIT (OUTPATIENT)
Dept: CARDIOLOGY CLINIC | Age: 80
End: 2025-01-28
Payer: MEDICARE

## 2025-01-28 VITALS
BODY MASS INDEX: 19.35 KG/M2 | SYSTOLIC BLOOD PRESSURE: 114 MMHG | HEART RATE: 85 BPM | WEIGHT: 86 LBS | HEIGHT: 56 IN | DIASTOLIC BLOOD PRESSURE: 82 MMHG | OXYGEN SATURATION: 100 %

## 2025-01-28 DIAGNOSIS — I50.9 ACUTE ON CHRONIC HEART FAILURE, UNSPECIFIED HEART FAILURE TYPE (HCC): ICD-10-CM

## 2025-01-28 DIAGNOSIS — I50.21 ACUTE SYSTOLIC CHF (CONGESTIVE HEART FAILURE) (HCC): ICD-10-CM

## 2025-01-28 DIAGNOSIS — I50.21 ACUTE SYSTOLIC (CONGESTIVE) HEART FAILURE (HCC): ICD-10-CM

## 2025-01-28 DIAGNOSIS — R09.89 BILATERAL CAROTID BRUITS: ICD-10-CM

## 2025-01-28 DIAGNOSIS — I50.9 CONGESTIVE HEART FAILURE, UNSPECIFIED HF CHRONICITY, UNSPECIFIED HEART FAILURE TYPE (HCC): Primary | ICD-10-CM

## 2025-01-28 PROCEDURE — 1123F ACP DISCUSS/DSCN MKR DOCD: CPT

## 2025-01-28 PROCEDURE — 1160F RVW MEDS BY RX/DR IN RCRD: CPT

## 2025-01-28 PROCEDURE — 1159F MED LIST DOCD IN RCRD: CPT

## 2025-01-28 PROCEDURE — 99214 OFFICE O/P EST MOD 30 MIN: CPT

## 2025-01-28 PROCEDURE — 93000 ELECTROCARDIOGRAM COMPLETE: CPT

## 2025-01-28 NOTE — PATIENT INSTRUCTIONS
Keep all medications the same  I will talk with Dr.. Rob about stopping your Plavix and then I ll call you after I talk with him   Follow up with me in

## 2025-02-10 NOTE — PROGRESS NOTES
[facility]  [phone]      Patient: Hao Gooden  YOB: 1945         Chief Complaint   Patient presents with    Follow-up     No current cardiac complaints        Referring provider: Kenzie Platt MD    History of Present Illness:   Hao Gooden is a 79 y.o. female presenting in follow up.     Today she is here today alone. She reports she has been doing okay since last OV. Her biggest concern is her  who is not very mobile and has developed some wounds. Caring for him takes a lot of her time and energy.    With regard to medication therapy he/she has been compliant with prescribed regimen and has tolerated therapy to date.    Past Medical History:   has a past medical history of Diabetes mellitus (HCC) and Hypothyroid.    Surgical History:   has no past surgical history on file.     Current Outpatient Medications   Medication Sig Dispense Refill    insulin lispro, 1 Unit Dial, (HUMALOG KWIKPEN) 100 UNIT/ML SOPN Inject 4 Units into the skin 3 times daily (before meals) Also take correction if needed.  If pre meal blood glucose is:  150- 200 add 1 unit  201-250 add 3 units  251- 300 add 4 units  > 301 add 5 units 5 Adjustable Dose Pre-filled Pen Syringe 2    levothyroxine (SYNTHROID) 50 MCG tablet Take 1 tablet by mouth daily 100 tablet 2    insulin lispro, 1 Unit Dial, (HUMALOG KWIKPEN) 100 UNIT/ML SOPN 10 units sq ac and for the sliding scale up to 40 units daily 15 Adjustable Dose Pre-filled Pen Syringe 1    empagliflozin (JARDIANCE) 25 MG tablet TAKE 1 TABLET BY MOUTH DAILY 100 tablet 2    Continuous Glucose Sensor (FREESTYLE RUBY 3 SENSOR) MISC USE TO CHECK GLUCOSE AND CHANGE  EVERY 14 DAYS 8 each 2    alendronate (FOSAMAX) 70 MG tablet Take 1 tablet by mouth every 7 days 12 tablet 5    furosemide (LASIX) 20 MG tablet Take 0.5 tablets by mouth daily 90 tablet 1    insulin glargine (LANTUS SOLOSTAR) 100 UNIT/ML injection pen Inject 15 Units into the skin nightly

## 2025-02-13 ENCOUNTER — OFFICE VISIT (OUTPATIENT)
Dept: CARDIOLOGY CLINIC | Age: 80
End: 2025-02-13
Payer: MEDICARE

## 2025-02-13 VITALS
OXYGEN SATURATION: 94 % | HEART RATE: 64 BPM | DIASTOLIC BLOOD PRESSURE: 62 MMHG | BODY MASS INDEX: 19.8 KG/M2 | SYSTOLIC BLOOD PRESSURE: 124 MMHG | WEIGHT: 88 LBS | HEIGHT: 56 IN

## 2025-02-13 DIAGNOSIS — N18.2 STAGE 2 CHRONIC KIDNEY DISEASE: ICD-10-CM

## 2025-02-13 DIAGNOSIS — Z98.61 HISTORY OF PERCUTANEOUS CORONARY INTERVENTION: ICD-10-CM

## 2025-02-13 DIAGNOSIS — E11.69 TYPE 2 DIABETES MELLITUS WITH OTHER SPECIFIED COMPLICATION, WITH LONG-TERM CURRENT USE OF INSULIN (HCC): ICD-10-CM

## 2025-02-13 DIAGNOSIS — Z79.4 TYPE 2 DIABETES MELLITUS WITH OTHER SPECIFIED COMPLICATION, WITH LONG-TERM CURRENT USE OF INSULIN (HCC): ICD-10-CM

## 2025-02-13 DIAGNOSIS — I25.84 CORONARY ARTERY DISEASE DUE TO CALCIFIED CORONARY LESION: Primary | ICD-10-CM

## 2025-02-13 DIAGNOSIS — I25.10 CORONARY ARTERY DISEASE DUE TO CALCIFIED CORONARY LESION: Primary | ICD-10-CM

## 2025-02-13 DIAGNOSIS — I10 HYPERTENSION, UNSPECIFIED TYPE: ICD-10-CM

## 2025-02-13 DIAGNOSIS — I50.22 CHRONIC SYSTOLIC HEART FAILURE (HCC): ICD-10-CM

## 2025-02-13 DIAGNOSIS — E78.2 MIXED HYPERLIPIDEMIA: ICD-10-CM

## 2025-02-13 PROCEDURE — 99214 OFFICE O/P EST MOD 30 MIN: CPT | Performed by: INTERNAL MEDICINE

## 2025-02-13 PROCEDURE — 3046F HEMOGLOBIN A1C LEVEL >9.0%: CPT | Performed by: INTERNAL MEDICINE

## 2025-02-13 PROCEDURE — G2211 COMPLEX E/M VISIT ADD ON: HCPCS | Performed by: INTERNAL MEDICINE

## 2025-02-13 PROCEDURE — 1159F MED LIST DOCD IN RCRD: CPT | Performed by: INTERNAL MEDICINE

## 2025-02-13 PROCEDURE — 3078F DIAST BP <80 MM HG: CPT | Performed by: INTERNAL MEDICINE

## 2025-02-13 PROCEDURE — 1123F ACP DISCUSS/DSCN MKR DOCD: CPT | Performed by: INTERNAL MEDICINE

## 2025-02-13 PROCEDURE — 3074F SYST BP LT 130 MM HG: CPT | Performed by: INTERNAL MEDICINE

## 2025-03-05 ENCOUNTER — OFFICE VISIT (OUTPATIENT)
Dept: ENDOCRINOLOGY | Age: 80
End: 2025-03-05
Payer: MEDICARE

## 2025-03-05 VITALS
HEIGHT: 56 IN | OXYGEN SATURATION: 98 % | RESPIRATION RATE: 16 BRPM | DIASTOLIC BLOOD PRESSURE: 71 MMHG | HEART RATE: 79 BPM | TEMPERATURE: 98 F | SYSTOLIC BLOOD PRESSURE: 120 MMHG | WEIGHT: 87.2 LBS | BODY MASS INDEX: 19.61 KG/M2

## 2025-03-05 DIAGNOSIS — E03.9 HYPOTHYROIDISM, UNSPECIFIED TYPE: ICD-10-CM

## 2025-03-05 DIAGNOSIS — I50.9 CONGESTIVE HEART FAILURE, UNSPECIFIED HF CHRONICITY, UNSPECIFIED HEART FAILURE TYPE (HCC): ICD-10-CM

## 2025-03-05 DIAGNOSIS — E10.59 TYPE 1 DIABETES MELLITUS WITH OTHER CIRCULATORY COMPLICATION (HCC): Primary | ICD-10-CM

## 2025-03-05 PROCEDURE — 1160F RVW MEDS BY RX/DR IN RCRD: CPT | Performed by: INTERNAL MEDICINE

## 2025-03-05 PROCEDURE — 3046F HEMOGLOBIN A1C LEVEL >9.0%: CPT | Performed by: INTERNAL MEDICINE

## 2025-03-05 PROCEDURE — 95251 CONT GLUC MNTR ANALYSIS I&R: CPT | Performed by: INTERNAL MEDICINE

## 2025-03-05 PROCEDURE — 99214 OFFICE O/P EST MOD 30 MIN: CPT | Performed by: INTERNAL MEDICINE

## 2025-03-05 PROCEDURE — 1159F MED LIST DOCD IN RCRD: CPT | Performed by: INTERNAL MEDICINE

## 2025-03-05 PROCEDURE — 1123F ACP DISCUSS/DSCN MKR DOCD: CPT | Performed by: INTERNAL MEDICINE

## 2025-03-05 RX ORDER — INSULIN LISPRO 100 [IU]/ML
INJECTION, SOLUTION INTRAVENOUS; SUBCUTANEOUS
Qty: 5 ADJUSTABLE DOSE PRE-FILLED PEN SYRINGE | Refills: 4 | Status: SHIPPED | OUTPATIENT
Start: 2025-03-05

## 2025-03-05 NOTE — PROGRESS NOTES
Elevated liver function tests    Anemia    Bilateral pleural effusion    Acute on chronic heart failure, unspecified heart failure type (HCC)    Acute respiratory failure with hypoxia (HCC)    Suspected COVID-19 virus infection    Acute pulmonary edema (HCC)    Shortness of breath    Acute systolic (congestive) heart failure (HCC)    Acute systolic CHF (congestive heart failure) (HCC)    Conductive hearing loss, middle ear    Osteoporosis    Bilateral carotid bruits    Hypothyroidism    Hyperglycemia    Acute on chronic combined systolic (congestive) and diastolic (congestive) heart failure (HCC)    NSTEMI (non-ST elevated myocardial infarction) (HCC)    Chronic renal disease, stage III ([facility]) [910124]    Protein calorie malnutrition    Type 1 diabetes mellitus with other circulatory complication (HCC)     History reviewed. No pertinent surgical history.  Social History     Socioeconomic History    Marital status:      Spouse name: Mahin    Number of children: 3    Years of education: Not on file    Highest education level: Not on file   Occupational History    Not on file   Tobacco Use    Smoking status: Never    Smokeless tobacco: Never   Vaping Use    Vaping status: Never Used   Substance and Sexual Activity    Alcohol use: No    Drug use: Never    Sexual activity: Not on file   Other Topics Concern    Not on file   Social History Narrative    Not on file     Social Determinants of Health     Financial Resource Strain: Not on file   Food Insecurity: Unknown (4/23/2024)    [facility], [facility]    Food Insecurities     Worried about running out of food: Not on file     Food Bought: Not on file   Transportation Needs: Unknown (4/23/2024)    Received from [facility], [facility] and [facility]    Transportation     Worried about transportation: Not on file   Physical Activity: Not on file

## 2025-04-02 ENCOUNTER — OFFICE VISIT (OUTPATIENT)
Dept: ENDOCRINOLOGY | Age: 80
End: 2025-04-02
Payer: MEDICARE

## 2025-04-02 DIAGNOSIS — E11.65 POORLY CONTROLLED TYPE 2 DIABETES MELLITUS WITH COMPLICATION (HCC): Primary | ICD-10-CM

## 2025-04-02 DIAGNOSIS — E11.8 POORLY CONTROLLED TYPE 2 DIABETES MELLITUS WITH COMPLICATION (HCC): Primary | ICD-10-CM

## 2025-04-02 PROCEDURE — 3046F HEMOGLOBIN A1C LEVEL >9.0%: CPT

## 2025-04-02 PROCEDURE — 97803 MED NUTRITION INDIV SUBSEQ: CPT

## 2025-04-02 NOTE — PROGRESS NOTES
medications for this visit.       NUTRITION ASSESSMENT    Biochemical Data:  Reference range:  Normal <5.7%  Prediabetes: 5.7 - 6.4%  Diabetes: >6.4%  Glycemic control for adults with diabetes: <7.0 %     Lab Results   Component Value Date    LABA1C 9.2 01/17/2025     Lab Results   Component Value Date    .3 01/17/2025       Lab Results   Component Value Date    CHOL 125 01/17/2025    CHOL 146 07/23/2024    CHOL 138 04/03/2024     Lab Results   Component Value Date    TRIG 63 01/17/2025    TRIG 50 07/23/2024    TRIG 77 04/03/2024     Lab Results   Component Value Date    HDL 48 01/17/2025    HDL 70 (H) 07/23/2024    HDL 64 (H) 04/03/2024     No components found for: \"LDLCALC\", \"LDLCHOLESTEROL\"    Lab Results   Component Value Date    VLDL 13 01/17/2025    VLDL 10 07/23/2024    VLDL 15 04/03/2024       No results found for: \"CHOLHDLRATIO\"    Lab Results   Component Value Date    WBC 6.0 01/17/2025    HGB 12.0 01/17/2025    HCT 35.6 (L) 01/17/2025    MCV 95.3 01/17/2025     01/17/2025       Lab Results   Component Value Date    CREATININE 1.0 01/17/2025    BUN 32 (H) 01/17/2025     01/17/2025    K 4.5 01/17/2025     01/17/2025    CO2 22 01/17/2025       Anthropometric Measurements:  Wt:   Wt Readings from Last 3 Encounters:   03/05/25 39.6 kg (87 lb 3.2 oz)   02/13/25 39.9 kg (88 lb)   01/28/25 39 kg (86 lb)      BMI:   BMI Readings from Last 3 Encounters:   03/05/25 19.56 kg/m²   02/13/25 19.73 kg/m²   01/28/25 19.28 kg/m²     Patient's stated goal weight: n/a  7% Weight loss goal weight: n/a    Food and Nutrition History:   Nutrition Awareness/Previous DSMES: yes  Number of people in household: 2  Frequency of Meals Eaten away from home:infrequent  Food Availability Problems  Within the past 12 months, have you worried that your food would run out before you got money to buy more?No  Within the past 12 months, has the food you bought not lasted till the end of the month and you didn't have

## 2025-06-25 ENCOUNTER — HOSPITAL ENCOUNTER (OUTPATIENT)
Age: 80
Discharge: HOME | End: 2025-06-25
Payer: MEDICARE

## 2025-06-25 DIAGNOSIS — I50.9 CONGESTIVE HEART FAILURE, UNSPECIFIED HF CHRONICITY, UNSPECIFIED HEART FAILURE TYPE (HCC): ICD-10-CM

## 2025-06-25 DIAGNOSIS — E10.59 TYPE 1 DIABETES MELLITUS WITH OTHER CIRCULATORY COMPLICATION (HCC): ICD-10-CM

## 2025-06-25 DIAGNOSIS — E03.9 HYPOTHYROIDISM, UNSPECIFIED TYPE: ICD-10-CM

## 2025-06-25 LAB
ALBUMIN SERPL-MCNC: 4.2 G/DL (ref 3.4–5)
ALBUMIN/GLOB SERPL: 1.3 {RATIO} (ref 1.1–2.2)
ALP SERPL-CCNC: 74 U/L (ref 40–129)
ALT SERPL-CCNC: 18 U/L (ref 10–40)
ANION GAP SERPL CALCULATED.3IONS-SCNC: 11 MMOL/L (ref 3–16)
AST SERPL-CCNC: 28 U/L (ref 15–37)
BILIRUB SERPL-MCNC: 0.3 MG/DL (ref 0–1)
BUN SERPL-MCNC: 21 MG/DL (ref 7–20)
CALCIUM SERPL-MCNC: 9.7 MG/DL (ref 8.3–10.6)
CHLORIDE SERPL-SCNC: 102 MMOL/L (ref 99–110)
CHOLEST SERPL-MCNC: 155 MG/DL (ref 0–199)
CO2 SERPL-SCNC: 23 MMOL/L (ref 21–32)
CREAT SERPL-MCNC: 1 MG/DL (ref 0.6–1.2)
CREAT UR-MCNC: 82.1 MG/DL (ref 28–259)
DEPRECATED RDW RBC AUTO: 12.1 % (ref 12.4–15.4)
EST. AVERAGE GLUCOSE BLD GHB EST-MCNC: 157.1 MG/DL
GFR SERPLBLD CREATININE-BSD FMLA CKD-EPI: 58 ML/MIN/{1.73_M2}
GLUCOSE SERPL-MCNC: 140 MG/DL (ref 70–99)
HBA1C MFR BLD: 7.1 %
HCT VFR BLD AUTO: 33.7 % (ref 36–48)
HDLC SERPL-MCNC: 62 MG/DL (ref 40–60)
HGB BLD-MCNC: 11.6 G/DL (ref 12–16)
LDLC SERPL CALC-MCNC: 82 MG/DL
MCH RBC QN AUTO: 31.9 PG (ref 26–34)
MCHC RBC AUTO-ENTMCNC: 34.3 G/DL (ref 31–36)
MCV RBC AUTO: 93 FL (ref 80–100)
MICROALBUMIN UR DL<=1MG/L-MCNC: 38.2 MG/DL
MICROALBUMIN/CREAT UR: 465.3 MG/G (ref 0–30)
NT-PROBNP SERPL-MCNC: 904 PG/ML (ref 0–449)
PLATELET # BLD AUTO: 258 K/UL (ref 135–450)
PMV BLD AUTO: 9.3 FL (ref 5–10.5)
POTASSIUM SERPL-SCNC: 4.6 MMOL/L (ref 3.5–5.1)
PROT SERPL-MCNC: 7.4 G/DL (ref 6.4–8.2)
RBC # BLD AUTO: 3.63 M/UL (ref 4–5.2)
SODIUM SERPL-SCNC: 136 MMOL/L (ref 136–145)
TRIGL SERPL-MCNC: 55 MG/DL (ref 0–150)
TSH SERPL DL<=0.005 MIU/L-ACNC: 3.53 UIU/ML (ref 0.27–4.2)
VLDLC SERPL CALC-MCNC: 11 MG/DL
WBC # BLD AUTO: 6.8 K/UL (ref 4–11)

## 2025-06-25 PROCEDURE — 85027 COMPLETE CBC AUTOMATED: CPT

## 2025-06-25 PROCEDURE — 82043 UR ALBUMIN QUANTITATIVE: CPT

## 2025-06-25 PROCEDURE — 80061 LIPID PANEL: CPT

## 2025-06-25 PROCEDURE — 83880 ASSAY OF NATRIURETIC PEPTIDE: CPT

## 2025-06-25 PROCEDURE — 82570 ASSAY OF URINE CREATININE: CPT

## 2025-06-25 PROCEDURE — 84443 ASSAY THYROID STIM HORMONE: CPT

## 2025-06-25 PROCEDURE — 36415 COLL VENOUS BLD VENIPUNCTURE: CPT

## 2025-06-25 PROCEDURE — 80053 COMPREHEN METABOLIC PANEL: CPT

## 2025-06-25 PROCEDURE — 83036 HEMOGLOBIN GLYCOSYLATED A1C: CPT

## 2025-06-26 ENCOUNTER — RESULTS FOLLOW-UP (OUTPATIENT)
Dept: CARDIOLOGY CLINIC | Age: 80
End: 2025-06-26

## 2025-06-26 ENCOUNTER — OFFICE VISIT (OUTPATIENT)
Dept: ENDOCRINOLOGY | Age: 80
End: 2025-06-26
Payer: MEDICARE

## 2025-06-26 VITALS
BODY MASS INDEX: 18.9 KG/M2 | SYSTOLIC BLOOD PRESSURE: 112 MMHG | WEIGHT: 84 LBS | DIASTOLIC BLOOD PRESSURE: 78 MMHG | HEIGHT: 56 IN | HEART RATE: 84 BPM

## 2025-06-26 DIAGNOSIS — E11.65 POORLY CONTROLLED TYPE 2 DIABETES MELLITUS WITH COMPLICATION (HCC): Primary | ICD-10-CM

## 2025-06-26 DIAGNOSIS — I50.9 CONGESTIVE HEART FAILURE, UNSPECIFIED HF CHRONICITY, UNSPECIFIED HEART FAILURE TYPE (HCC): ICD-10-CM

## 2025-06-26 DIAGNOSIS — E55.9 VITAMIN D DEFICIENCY: ICD-10-CM

## 2025-06-26 DIAGNOSIS — E11.8 POORLY CONTROLLED TYPE 2 DIABETES MELLITUS WITH COMPLICATION (HCC): Primary | ICD-10-CM

## 2025-06-26 DIAGNOSIS — E03.9 HYPOTHYROIDISM, UNSPECIFIED TYPE: ICD-10-CM

## 2025-06-26 PROCEDURE — G2211 COMPLEX E/M VISIT ADD ON: HCPCS | Performed by: INTERNAL MEDICINE

## 2025-06-26 PROCEDURE — 99214 OFFICE O/P EST MOD 30 MIN: CPT | Performed by: INTERNAL MEDICINE

## 2025-06-26 PROCEDURE — 1159F MED LIST DOCD IN RCRD: CPT | Performed by: INTERNAL MEDICINE

## 2025-06-26 PROCEDURE — 1160F RVW MEDS BY RX/DR IN RCRD: CPT | Performed by: INTERNAL MEDICINE

## 2025-06-26 PROCEDURE — 3051F HG A1C>EQUAL 7.0%<8.0%: CPT | Performed by: INTERNAL MEDICINE

## 2025-06-26 PROCEDURE — 1123F ACP DISCUSS/DSCN MKR DOCD: CPT | Performed by: INTERNAL MEDICINE

## 2025-06-26 RX ORDER — ACYCLOVIR 800 MG/1
TABLET ORAL
Qty: 8 EACH | Refills: 2 | Status: SHIPPED | OUTPATIENT
Start: 2025-06-26

## 2025-06-26 NOTE — PROGRESS NOTES
Hao Gooden is a 80 y.o. female is seen for  management of  uncontrolled Type 2 diabetes. Hao Gooden was diagnosed with Diabetes mellitus in 1994.  Historically patient had poorly controlled diabetes with A1c above 8 most of the time.  Diabetes was diagnosed at routine screening.   Hao Gooden got diabetic education in the past.  Comorbid conditions: Neuropathy and Coronary Artery Disease  Patient was initially started on metformin, she was on Jardiance which she stopped for a few months due to cost issue but ended up having worsening coronary artery disease and needed PCI in September 2023.  Patient started insulin 2 years ago and has been on basal bolus insulin regimen.    Patient also has comorbidities that include Cad s/p PCI in 2022 and 2023 , HTN , Hyperlipidemia and Osteoporosis and is on fosamax started in sept 2023    She has Hypothyroidism for years and has been on meds some hx of noncompliance as her son told her she should do natural supplements which led to a TSH above 5 when she stopped taking her thyroid medication but now she takes her thyroid medication last evaluation in September 2023 was normal TSH.    INTERIM:      Lives with her son and    Denies any significant hypoglycemia  Has resumed meal boluses and it improved her glycemia   Has bene busy taking care of her  who got really sick while she was gone     Past Medical History:   Diagnosis Date    Diabetes mellitus (HCC)     Hypothyroid       Patient Active Problem List   Diagnosis    CHF (congestive heart failure) (HCC)    DM2 (diabetes mellitus, type 2) (HCC)    Elevated liver function tests    Anemia    Bilateral pleural effusion    Acute on chronic heart failure, unspecified heart failure type (HCC)    Acute respiratory failure with hypoxia (HCC)    Suspected COVID-19 virus infection    Acute pulmonary edema (HCC)    Shortness of breath    Acute systolic (congestive) heart failure (HCC)    Acute systolic CHF

## 2025-06-26 NOTE — PROGRESS NOTES
Patient Name: Leslie Low  Exported By: [facility]  Exported from [facility]: 06-, 3:30 PM  Data sources: MiniMed™ 780G, MMT-1884 ([id number]), Guardian™ 4 Sensor  14 days of available data from 06- - 06-    Device Settings  Carb ratio (grams/units)  Time   Value  00:00:00:   9  05:00:00:   6.5  SmartGuard™ target: 100 mg/dL  Active insulin time: 2.25 hours    Glucose overview  Average sensor glucose: 131 mg/dL ± 38 mg/dL  Glucose Management Indicator (GMI): 6.5%  Coefficient of Variation (CV): 29%    Time in range  < 50 mg/dL: 0%  50 mg/dL - < 70 mg/dL: 0%  70 mg/dL - 180 mg/dL: 89%  >180 mg/dL - 250 mg/dL: 10%  >250 mg/dL: 1%    Insulin per day  Total daily dose: 48.4 units  Bolus: 28%  Bolus: 13.5 units  Auto Correction: 48%  Auto Correction: 6.5 units  Basal: 72%  Basal: 34.9 units    Device usage  Guardian™ 4 Sensor: 96% 6 day(s) 17 hour(s) per week  SmartGuard™ use: 96% 6 day(s) 17 hour(s) per week  Set change: Every 7 day(s)  Reservoir change: Every 3.5 day(s)

## 2025-06-27 RX ORDER — SITAGLIPTIN AND METFORMIN HYDROCHLORIDE 1000; 50 MG/1; MG/1
1 TABLET, FILM COATED, EXTENDED RELEASE ORAL DAILY
COMMUNITY

## 2025-07-01 ENCOUNTER — OFFICE VISIT (OUTPATIENT)
Dept: CARDIOLOGY CLINIC | Age: 80
End: 2025-07-01
Payer: MEDICARE

## 2025-07-01 VITALS
SYSTOLIC BLOOD PRESSURE: 124 MMHG | DIASTOLIC BLOOD PRESSURE: 60 MMHG | HEART RATE: 74 BPM | OXYGEN SATURATION: 99 % | HEIGHT: 56 IN | BODY MASS INDEX: 19.12 KG/M2 | WEIGHT: 85 LBS

## 2025-07-01 DIAGNOSIS — Z91.148 NON COMPLIANCE W MEDICATION REGIMEN: ICD-10-CM

## 2025-07-01 DIAGNOSIS — I25.84 CORONARY ARTERY DISEASE DUE TO CALCIFIED CORONARY LESION: ICD-10-CM

## 2025-07-01 DIAGNOSIS — I50.22 CHRONIC SYSTOLIC HEART FAILURE (HCC): Primary | ICD-10-CM

## 2025-07-01 DIAGNOSIS — I25.10 CORONARY ARTERY DISEASE DUE TO CALCIFIED CORONARY LESION: ICD-10-CM

## 2025-07-01 PROCEDURE — 1159F MED LIST DOCD IN RCRD: CPT | Performed by: CLINICAL NURSE SPECIALIST

## 2025-07-01 PROCEDURE — 1160F RVW MEDS BY RX/DR IN RCRD: CPT | Performed by: CLINICAL NURSE SPECIALIST

## 2025-07-01 PROCEDURE — 99214 OFFICE O/P EST MOD 30 MIN: CPT | Performed by: CLINICAL NURSE SPECIALIST

## 2025-07-01 PROCEDURE — 1123F ACP DISCUSS/DSCN MKR DOCD: CPT | Performed by: CLINICAL NURSE SPECIALIST

## 2025-07-01 RX ORDER — FUROSEMIDE 20 MG/1
10 TABLET ORAL DAILY
Qty: 30 TABLET | Refills: 2 | Status: SHIPPED | OUTPATIENT
Start: 2025-07-01

## 2025-07-01 RX ORDER — METOPROLOL SUCCINATE 25 MG/1
12.5 TABLET, EXTENDED RELEASE ORAL DAILY
Qty: 15 TABLET | Refills: 2 | Status: SHIPPED | OUTPATIENT
Start: 2025-07-01

## 2025-07-01 RX ORDER — VALSARTAN 80 MG/1
80 TABLET ORAL DAILY
Qty: 30 TABLET | Refills: 2 | Status: SHIPPED | OUTPATIENT
Start: 2025-07-01

## 2025-07-01 NOTE — PATIENT INSTRUCTIONS
Resume the lasix, jardiance and metoprolol  Continue all other medications  Make sure you  your medications from the pharmacy today  RTO in 2 month  Stop entresto and start valsartan 80 mg once a day

## 2025-07-01 NOTE — PROGRESS NOTES
[facility] [place]  Progress Note    Primary Care Doctor:  Kenzie Platt MD    Chief Complaint   Patient presents with    Follow-up    Congestive Heart Failure        History of Present Illness:  80 y.o. female with history of diabetes with A1c of 10.1, systolic heart failure   8/10-14/2022 for shortness of breath, acute systolic heart failure with elevated LFTs, bilateral pleural effusions, mildly anemia, LHC with PCI to LAD.   9/26-30/2023 for shortness of breath, acute on chronic s/diastolic heart failure, nstemi.  A1c 13.6, bnp 11K to 2969.  She received some IV venofer.    I had the pleasure of seeing Hao Gooden in follow up for systolic heart failure with LVEF 50-55%.  She is ambulatory and by her self as her  has been in and out of the hospital.    History of Present Illness    She has been without her medications for the past 2 months due to financial constraints. She went to [facility] for 4 months by her self.  She is currently on Medicare D, which does not cover the cost of Jardiance and Lasix. Her daughter has been assisting her with the purchase of these medications. She reports feeling overwhelmed as she is the primary caregiver for her  and two sons. H  No chest pain, edema, increased shortness of breath or palpitations.        Past Medical History:   has a past medical history of Diabetes mellitus (HCC) and Hypothyroid.  Surgical History:   has no past surgical history on file.   Social History:   reports that she has never smoked. She has never used smokeless tobacco. She reports that she does not drink alcohol and does not use drugs.   Family History:   No family history on file.    Home Medications:  Prior to Admission medications    Medication Sig Start Date End Date Taking? Authorizing Provider   SUPER B COMPLEX/C PO Take 1 tablet by mouth daily   Yes Provider, MD Grant   furosemide (LASIX) 20 MG tablet Take 0.5 tablets by mouth daily 7/1/25  Yes Guillermina Liang

## 2025-07-28 ENCOUNTER — OFFICE VISIT (OUTPATIENT)
Dept: CARDIOLOGY CLINIC | Age: 80
End: 2025-07-28
Payer: MEDICARE

## 2025-07-28 VITALS
HEART RATE: 62 BPM | HEIGHT: 56 IN | DIASTOLIC BLOOD PRESSURE: 60 MMHG | BODY MASS INDEX: 19.01 KG/M2 | WEIGHT: 84.5 LBS | SYSTOLIC BLOOD PRESSURE: 118 MMHG | OXYGEN SATURATION: 98 %

## 2025-07-28 DIAGNOSIS — E78.2 MIXED HYPERLIPIDEMIA: ICD-10-CM

## 2025-07-28 DIAGNOSIS — N18.2 STAGE 2 CHRONIC KIDNEY DISEASE: ICD-10-CM

## 2025-07-28 DIAGNOSIS — Z98.61 HISTORY OF PERCUTANEOUS CORONARY INTERVENTION: ICD-10-CM

## 2025-07-28 DIAGNOSIS — I10 HYPERTENSION, UNSPECIFIED TYPE: ICD-10-CM

## 2025-07-28 DIAGNOSIS — I25.84 CORONARY ARTERY DISEASE DUE TO CALCIFIED CORONARY LESION: Primary | ICD-10-CM

## 2025-07-28 DIAGNOSIS — I25.10 CORONARY ARTERY DISEASE DUE TO CALCIFIED CORONARY LESION: Primary | ICD-10-CM

## 2025-07-28 DIAGNOSIS — I50.22 CHRONIC SYSTOLIC HEART FAILURE (HCC): ICD-10-CM

## 2025-07-28 PROCEDURE — 99214 OFFICE O/P EST MOD 30 MIN: CPT

## 2025-07-28 PROCEDURE — 3078F DIAST BP <80 MM HG: CPT

## 2025-07-28 PROCEDURE — 1159F MED LIST DOCD IN RCRD: CPT

## 2025-07-28 PROCEDURE — 1160F RVW MEDS BY RX/DR IN RCRD: CPT

## 2025-07-28 PROCEDURE — 3074F SYST BP LT 130 MM HG: CPT

## 2025-07-28 PROCEDURE — 1123F ACP DISCUSS/DSCN MKR DOCD: CPT

## 2025-07-28 NOTE — PATIENT INSTRUCTIONS
Continue current medication regime- No changes   Follow up with Dr.Carson   Call if you have any further concerns

## 2025-07-28 NOTE — PROGRESS NOTES
[facility]     Outpatient Follow Up Note    Hao Gooden is 80 y.o. female who presents today with a history of coronary artery disease, systolic heart failure, diabetes and elevated LFT's     CHIEF COMPLAINT / HPI:  Follow Up secondary to coronary artery disease and status post coronary artery stenting  PCI to LAD.     History of Present Illness  The patient presents for evaluation of cardiac issues.    She reports feeling well overall, with no complaints of chest pain, shortness of breath, or dizziness. Her , who was in the hospital and nursing home since 12/2024, is now home and doing much better. She is actively involved in his care, including tube feeding. She experiences occasional sleepiness in the afternoons, which she attributes to her late bedtime and early wake-up routine. She is on valsartan, rosuvastatin, metoprolol, thyroid medication, insulin, Lasix, Jardiance, and aspirin.    She mentions experiencing mild leg pain during certain movements. She has not been engaging in regular exercise but maintains an active lifestyle through gardening and caring for her . Her last consultation with Dr. Rob was uneventful, and she is scheduled to see  Tiff in 08/2025.    SOCIAL HISTORY  Marital Status:   Exercise: Gardening, taking care of , frequent movement up and down stairs  Coffee/Tea/Caffeine-containing Drinks: Drinks tea             Past Medical History:  Past Medical History:   Diagnosis Date    Diabetes mellitus (HCC)     Hypothyroid      Social History:    Social History     Tobacco Use   Smoking Status Never    Passive exposure: Past   Smokeless Tobacco Never     Current Medications:  Current Outpatient Medications   Medication Sig Dispense Refill    SUPER B COMPLEX/C PO Take 1 tablet by mouth daily      furosemide (LASIX) 20 MG tablet Take 0.5 tablets by mouth daily 30 tablet 2    rosuvastatin (CRESTOR) 20 MG tablet TAKE 1 TABLET BY MOUTH EVERY  NIGHT 100

## 2025-08-26 ENCOUNTER — TELEPHONE (OUTPATIENT)
Dept: PHARMACY | Facility: CLINIC | Age: 80
End: 2025-08-26

## 2025-09-02 ENCOUNTER — OFFICE VISIT (OUTPATIENT)
Dept: CARDIOLOGY CLINIC | Age: 80
End: 2025-09-02

## 2025-09-02 VITALS
DIASTOLIC BLOOD PRESSURE: 58 MMHG | SYSTOLIC BLOOD PRESSURE: 124 MMHG | WEIGHT: 87 LBS | HEIGHT: 56 IN | BODY MASS INDEX: 19.57 KG/M2 | HEART RATE: 72 BPM | OXYGEN SATURATION: 98 %

## 2025-09-02 DIAGNOSIS — I25.10 CORONARY ARTERY DISEASE DUE TO CALCIFIED CORONARY LESION: ICD-10-CM

## 2025-09-02 DIAGNOSIS — D64.9 ANEMIA, UNSPECIFIED TYPE: ICD-10-CM

## 2025-09-02 DIAGNOSIS — I50.9 CONGESTIVE HEART FAILURE, UNSPECIFIED HF CHRONICITY, UNSPECIFIED HEART FAILURE TYPE (HCC): ICD-10-CM

## 2025-09-02 DIAGNOSIS — E11.8 POORLY CONTROLLED TYPE 2 DIABETES MELLITUS WITH COMPLICATION (HCC): ICD-10-CM

## 2025-09-02 DIAGNOSIS — E55.9 VITAMIN D DEFICIENCY: ICD-10-CM

## 2025-09-02 DIAGNOSIS — I25.84 CORONARY ARTERY DISEASE DUE TO CALCIFIED CORONARY LESION: ICD-10-CM

## 2025-09-02 DIAGNOSIS — E11.65 POORLY CONTROLLED TYPE 2 DIABETES MELLITUS WITH COMPLICATION (HCC): ICD-10-CM

## 2025-09-02 DIAGNOSIS — Z91.148 NON COMPLIANCE W MEDICATION REGIMEN: ICD-10-CM

## 2025-09-02 DIAGNOSIS — I50.22 CHRONIC SYSTOLIC CONGESTIVE HEART FAILURE (HCC): Primary | ICD-10-CM

## 2025-09-02 RX ORDER — SITAGLIPTIN AND METFORMIN HYDROCHLORIDE 1000; 50 MG/1; MG/1
1 TABLET, FILM COATED, EXTENDED RELEASE ORAL DAILY
Qty: 30 TABLET | Refills: 0 | Status: SHIPPED | OUTPATIENT
Start: 2025-09-02

## 2025-09-02 RX ORDER — FUROSEMIDE 20 MG/1
10 TABLET ORAL DAILY
Qty: 45 TABLET | Refills: 1 | Status: SHIPPED | OUTPATIENT
Start: 2025-09-02

## 2025-09-02 RX ORDER — METOPROLOL SUCCINATE 25 MG/1
12.5 TABLET, EXTENDED RELEASE ORAL DAILY
Qty: 45 TABLET | Refills: 2 | Status: SHIPPED | OUTPATIENT
Start: 2025-09-02

## 2025-09-02 RX ORDER — VALSARTAN 80 MG/1
80 TABLET ORAL DAILY
Qty: 90 TABLET | Refills: 1 | Status: SHIPPED | OUTPATIENT
Start: 2025-09-02

## 2025-09-02 RX ORDER — ROSUVASTATIN CALCIUM 20 MG/1
20 TABLET, COATED ORAL NIGHTLY
Qty: 90 TABLET | Refills: 1 | Status: SHIPPED | OUTPATIENT
Start: 2025-09-02